# Patient Record
Sex: MALE | Race: WHITE | Employment: FULL TIME | URBAN - METROPOLITAN AREA
[De-identification: names, ages, dates, MRNs, and addresses within clinical notes are randomized per-mention and may not be internally consistent; named-entity substitution may affect disease eponyms.]

---

## 2017-10-21 ENCOUNTER — HOSPITAL ENCOUNTER (OUTPATIENT)
Dept: LAB | Age: 67
Discharge: HOME | End: 2017-10-21

## 2017-10-21 ENCOUNTER — HOSPITAL ENCOUNTER (EMERGENCY)
Age: 67
Discharge: HOME | End: 2017-10-21
Attending: EMERGENCY MEDICINE

## 2017-10-21 VITALS
SYSTOLIC BLOOD PRESSURE: 111 MMHG | HEIGHT: 70 IN | TEMPERATURE: 99.4 F | WEIGHT: 181 LBS | HEART RATE: 82 BPM | BODY MASS INDEX: 25.91 KG/M2 | OXYGEN SATURATION: 96 % | RESPIRATION RATE: 16 BRPM | DIASTOLIC BLOOD PRESSURE: 58 MMHG

## 2017-10-21 DIAGNOSIS — M70.22 OLECRANON BURSITIS OF LEFT ELBOW: Primary | ICD-10-CM

## 2017-10-21 PROCEDURE — 89050 BODY FLUID CELL COUNT: CPT

## 2017-10-21 PROCEDURE — 89060 EXAM SYNOVIAL FLUID CRYSTALS: CPT

## 2017-10-21 PROCEDURE — 87205 SMEAR GRAM STAIN: CPT

## 2017-10-21 RX ORDER — PREDNISONE 20 MG/1
60 TABLET ORAL DAILY
Qty: 30 TAB | Refills: 0 | Status: SHIPPED | OUTPATIENT
Start: 2017-10-21 | End: 2017-10-26

## 2017-10-21 RX ORDER — COLCHICINE 0.6 MG/1
0.6 TABLET ORAL DAILY
COMMUNITY
End: 2018-08-24

## 2017-10-21 RX ORDER — OXYCODONE AND ACETAMINOPHEN 5; 325 MG/1; MG/1
1 TABLET ORAL
Qty: 20 TAB | Refills: 0 | Status: SHIPPED | OUTPATIENT
Start: 2017-10-21 | End: 2018-02-25

## 2017-10-21 NOTE — DISCHARGE INSTRUCTIONS
Bursitis: Care Instructions  Your Care Instructions  A bursa is a small sac of fluid that helps the tissues around a joint slide over one another easily. Injury or overuse of a joint can cause pain, redness, and inflammation in the bursa (bursitis). Bursitis usually gets better if you avoid the activity that caused it. You can help prevent bursitis from coming back by doing stretching and strengthening exercises. You may also need to change the way you do some activities. Follow-up care is a key part of your treatment and safety. Be sure to make and go to all appointments, and call your doctor if you are having problems. Its also a good idea to know your test results and keep a list of the medicines you take. How can you care for yourself at home? · Put ice or a cold pack on the area for 10 to 20 minutes at a time. Try to do this every 1 to 2 hours for the next 3 days (when you are awake) or until the swelling goes down. Put a thin cloth between the ice and your skin. · After the 3 days of using ice, you may use heat on the area. You can use a hot water bottle; a warm, moist towel; or a heating pad set on low. You can also try alternating heat and ice. · Rest the area where you have pain. Stop any activities that cause pain. Switch to activities that do not stress the area. · Take pain medicines exactly as directed. ¨ If the doctor gave you a prescription medicine for pain, take it as prescribed. ¨ If you are not taking a prescription pain medicine, ask your doctor if you can take an over-the-counter medicine. ¨ Do not take two or more pain medicines at the same time unless the doctor told you to. Many pain medicines have acetaminophen, which is Tylenol. Too much acetaminophen (Tylenol) can be harmful. · To prevent stiffness, gently move the joint as much as you can without pain every day. As the pain gets better, keep doing range-of-motion exercises.  Ask your doctor for exercises that will make the muscles around the joint stronger. Do these as directed. · You can slowly return to the activity that caused the pain, but do it with less effort until you can do it without pain or swelling. Be sure to warm up before and stretch after you do the activity. When should you call for help? Call your doctor now or seek immediate medical care if:  · You get a fever and chills. · You have increased swelling or redness in a joint. · You cannot use a joint, or the pain in a joint gets worse. Watch closely for changes in your health, and be sure to contact your doctor if:  · You have pain for 2 weeks or longer despite home treatment. Where can you learn more? Go to http://puma-katharina.info/. Enter Y003 in the search box to learn more about \"Bursitis: Care Instructions. \"  Current as of: May 23, 2016  Content Version: 11.3  © 0432-5316 [employer]. Care instructions adapted under license by [employer] (which disclaims liability or warranty for this information). If you have questions about a medical condition or this instruction, always ask your healthcare professional. [place] [address] any warranty or liability for your use of this information.

## 2017-10-21 NOTE — UC PROVIDER NOTE
HPI Comments: Hx of recurrent gout. On colchicine. Stopped allopurinol b/c gave him diarrhea. Has had hx of gout in this elbow, requiring steroid injection and course of po steroids. Patient is a 79 y.o. male presenting with elbow pain. The history is provided by the patient. Elbow Pain    This is a recurrent problem. Episode onset: 2-3 months; got worse starting yesterday. The problem occurs constantly. The problem has been gradually worsening. The pain is present in the left elbow. The quality of the pain is described as constant. The pain is severe. Pertinent negatives include no numbness, full range of motion, no stiffness, no tingling and no itching. The symptoms are aggravated by palpation. He has tried OTC pain medications for the symptoms. The treatment provided no relief. There has been no history of extremity trauma. No past medical history on file. No past surgical history on file. No family history on file. Social History     Social History    Marital status:      Spouse name: N/A    Number of children: N/A    Years of education: N/A     Occupational History    Not on file. Social History Main Topics    Smoking status: Not on file    Smokeless tobacco: Not on file    Alcohol use Not on file    Drug use: Not on file    Sexual activity: Not on file     Other Topics Concern    Not on file     Social History Narrative                ALLERGIES: Review of patient's allergies indicates no known allergies. Review of Systems   Constitutional: Negative for chills and fever. Musculoskeletal: Positive for arthralgias and joint swelling. Negative for stiffness. Skin: Negative for itching and rash. Neurological: Negative for tingling and numbness.        Vitals:    10/21/17 1856   BP: 111/58   Pulse: 82   Resp: 16   Temp: 99.4 °F (37.4 °C)   SpO2: 96%   Weight: 82.1 kg (181 lb)   Height: 5' 10\" (1.778 m)       Physical Exam   Constitutional: He is oriented to person, place, and time. He appears well-developed and well-nourished. No distress. HENT:   Head: Normocephalic and atraumatic. Eyes: Conjunctivae are normal.   Cardiovascular: Normal rate. Pulmonary/Chest: Effort normal.   Musculoskeletal:   Swollen olecranon bursa without overlying erythema or drainage   Neurological: He is alert and oriented to person, place, and time. Skin: He is not diaphoretic. MDM     Differential Diagnosis; Clinical Impression; Plan:     Left elbow bursitis. Possibly due to gout? Aspiration of bursa had some possibly tophaceous material.   - bursa fluid sent for analysis  - steroid course  - percocet  - f/u ortho      Procedures  Procedure Note - Arthrocentesis:   8:01 PM  Performed by Kameron Wall MD.     Immediately prior to the procedure, the patient was reevaluated and found suitable for the planned procedure and any planned medications. Immediately prior to the procedure a time out was called to verify the correct patient, procedure, equipment, staff, and marking as appropriate. Indication for procedure: Unexplained joint effusion  Approach: lateral  The site prepped with Betadine. Sterile field established. Anesthesia was obtained with 2mLs of Lidocaine 1% without epinephrine. L elbow bursa joint was entered, using a 18 gauge needle, and 7 cc's of straw colored/turbid fluid with chalky material was withdrawn and sent for aerobic culture, cell count & diff, crystal analysis and gram stain. Estimated blood loss: 1cc  The procedure took 1-15 minutes, and pt tolerated well.   Written by Kameron Wall MD

## 2017-10-22 LAB
APPEARANCE FLD: ABNORMAL
BODY FLD TYPE: NORMAL
COLOR FLD: ABNORMAL
CRYSTALS FLD MICRO: NORMAL
LYMPHOCYTES NFR FLD: 1 %
MONOS+MACROS NFR FLD: 2 %
NEUTROPHILS NFR FLD: 97 %
NUC CELL # FLD: ABNORMAL /CU MM (ref 0–5)
RBC # FLD: >100 /CU MM
SPECIMEN SOURCE FLD: ABNORMAL

## 2017-11-04 LAB
BACTERIA SPEC CULT: NORMAL
BACTERIA SPEC CULT: NORMAL
GRAM STN SPEC: NORMAL
GRAM STN SPEC: NORMAL
SERVICE CMNT-IMP: NORMAL

## 2018-02-25 ENCOUNTER — HOSPITAL ENCOUNTER (EMERGENCY)
Age: 68
Discharge: HOME | End: 2018-02-25
Attending: FAMILY MEDICINE

## 2018-02-25 VITALS
TEMPERATURE: 97.8 F | DIASTOLIC BLOOD PRESSURE: 70 MMHG | RESPIRATION RATE: 18 BRPM | OXYGEN SATURATION: 97 % | WEIGHT: 181 LBS | HEIGHT: 70 IN | HEART RATE: 69 BPM | SYSTOLIC BLOOD PRESSURE: 155 MMHG | BODY MASS INDEX: 25.91 KG/M2

## 2018-02-25 DIAGNOSIS — M10.9 ACUTE GOUT OF RIGHT ANKLE, UNSPECIFIED CAUSE: Primary | ICD-10-CM

## 2018-02-25 DIAGNOSIS — M10.9 ACUTE GOUT OF RIGHT ELBOW, UNSPECIFIED CAUSE: ICD-10-CM

## 2018-02-25 RX ORDER — PREDNISONE 20 MG/1
60 TABLET ORAL DAILY
Qty: 15 TAB | Refills: 0 | Status: SHIPPED | OUTPATIENT
Start: 2018-02-25 | End: 2018-03-02

## 2018-02-25 NOTE — DISCHARGE INSTRUCTIONS
Gout: Care Instructions  Your Care Instructions    Gout is a form of arthritis caused by a buildup of uric acid crystals in a joint. It causes sudden attacks of pain, swelling, redness, and stiffness, usually in one joint, especially the big toe. Gout usually comes on without a cause. But it can be brought on by drinking alcohol (especially beer) or eating seafood and red meat. Taking certain medicines, such as diuretics or aspirin, also can bring on an attack of gout. Taking your medicines as prescribed and following up with your doctor regularly can help you avoid gout attacks in the future. Follow-up care is a key part of your treatment and safety. Be sure to make and go to all appointments, and call your doctor if you are having problems. It's also a good idea to know your test results and keep a list of the medicines you take. How can you care for yourself at home? · If the joint is swollen, put ice or a cold pack on the area for 10 to 20 minutes at a time. Put a thin cloth between the ice and your skin. · Prop up the sore limb on a pillow when you ice it or anytime you sit or lie down during the next 3 days. Try to keep it above the level of your heart. This will help reduce swelling. · Rest sore joints. Avoid activities that put weight or strain on the joints for a few days. Take short rest breaks from your regular activities during the day. · Take your medicines exactly as prescribed. Call your doctor if you think you are having a problem with your medicine. · Take pain medicines exactly as directed. ¨ If the doctor gave you a prescription medicine for pain, take it as prescribed. ¨ If you are not taking a prescription pain medicine, ask your doctor if you can take an over-the-counter medicine. · Eat less seafood and red meat. · Check with your doctor before drinking alcohol. · Losing weight, if you are overweight, may help reduce attacks of gout. But do not go on a [employer]. \" Losing a lot of weight in a short amount of time can cause a gout attack. When should you call for help? Call your doctor now or seek immediate medical care if:  ? · You have a fever. ? · The joint is so painful you cannot use it. ? · You have sudden, unexplained swelling, redness, warmth, or severe pain in one or more joints. ? Watch closely for changes in your health, and be sure to contact your doctor if:  ? · You have joint pain. ? · Your symptoms get worse or are not improving after 2 or 3 days. Where can you learn more? Go to http://puma-katharina.info/. Enter P608 in the search box to learn more about \"Gout: Care Instructions. \"  Current as of: October 31, 2016  Content Version: 11.4  © 1083-8999 [employer]. Care instructions adapted under license by [employer] (which disclaims liability or warranty for this information). If you have questions about a medical condition or this instruction, always ask your healthcare professional. [address] any warranty or liability for your use of this information.

## 2018-02-25 NOTE — UC PROVIDER NOTE
HPI Comments: Patient has a 20+ year h/o gout. He takes colcrys and denies any recent changes to his diet and reports limiting his meat and etoh intake. The current pain and swelling are not unlike what he has experienced before. The pain began last week in both areas. Patient is a 76 y.o. male presenting with elbow pain and knee pain. The history is provided by the patient. Elbow Pain    The current episode started more than 2 days ago. The pain is present in the right elbow and right lower leg. The quality of the pain is described as constant. The pain is moderate. Treatments tried: colcrys. The treatment provided no relief. Knee Pain           Past Medical History:   Diagnosis Date    Ill-defined condition     gout        History reviewed. No pertinent surgical history. History reviewed. No pertinent family history. Social History     Social History    Marital status:      Spouse name: N/A    Number of children: N/A    Years of education: N/A     Occupational History    Not on file. Social History Main Topics    Smoking status: Current Every Day Smoker    Smokeless tobacco: Former User    Alcohol use Not on file    Drug use: Not on file    Sexual activity: Not on file     Other Topics Concern    Not on file     Social History Narrative    No narrative on file                ALLERGIES: Review of patient's allergies indicates no known allergies. Review of Systems    Vitals:    02/25/18 0902   BP: 155/70   Pulse: 69   Resp: 18   Temp: 97.8 °F (36.6 °C)   SpO2: 97%   Weight: 82.1 kg (181 lb)   Height: 5' 10\" (1.778 m)       Physical Exam   Constitutional: He is oriented to person, place, and time. He appears well-developed and well-nourished. Musculoskeletal:        Right elbow: He exhibits swelling. Tenderness found. Right ankle: He exhibits swelling. Tenderness. Lateral malleolus tenderness found. Swollen and tender areas on the right elbow and ankle.  No red streaks or bruising. Neurological: He is alert and oriented to person, place, and time. Skin: Skin is warm and dry. Psychiatric: He has a normal mood and affect. MDM     Differential Diagnosis; Clinical Impression; Plan:     Gout flare up. Will treat with steroids. Advised f/u with orthopedics due to chronicity. Discussed results of joint aspiration from last visit.  Will f/u with pcp for BP  Progress:   Patient progress:  Stable      Procedures

## 2018-07-01 ENCOUNTER — APPOINTMENT (OUTPATIENT)
Dept: GENERAL RADIOLOGY | Age: 68
DRG: 853 | End: 2018-07-01
Attending: EMERGENCY MEDICINE
Payer: COMMERCIAL

## 2018-07-01 ENCOUNTER — HOSPITAL ENCOUNTER (INPATIENT)
Age: 68
LOS: 6 days | Discharge: HOME | DRG: 853 | End: 2018-07-07
Attending: EMERGENCY MEDICINE | Admitting: INTERNAL MEDICINE
Payer: COMMERCIAL

## 2018-07-01 DIAGNOSIS — M10.9 GOUTY ARTHRITIS: ICD-10-CM

## 2018-07-01 DIAGNOSIS — A41.9 SEPTIC SHOCK (HCC): Primary | ICD-10-CM

## 2018-07-01 DIAGNOSIS — I48.92 ATRIAL FLUTTER WITH CONTROLLED RESPONSE (HCC): ICD-10-CM

## 2018-07-01 DIAGNOSIS — R65.21 SEPTIC SHOCK (HCC): Primary | ICD-10-CM

## 2018-07-01 DIAGNOSIS — N17.9 ACUTE RENAL FAILURE, UNSPECIFIED ACUTE RENAL FAILURE TYPE (HCC): ICD-10-CM

## 2018-07-01 PROBLEM — R65.20 SEVERE SEPSIS (HCC): Status: ACTIVE | Noted: 2018-07-01

## 2018-07-01 LAB
ALBUMIN SERPL-MCNC: 3.1 G/DL (ref 3.5–5)
ALBUMIN/GLOB SERPL: 0.9 {RATIO} (ref 1.1–2.2)
ALP SERPL-CCNC: 115 U/L (ref 45–117)
ALT SERPL-CCNC: 46 U/L (ref 12–78)
ANION GAP SERPL CALC-SCNC: 10 MMOL/L (ref 5–15)
APPEARANCE UR: CLEAR
ARTERIAL PATENCY WRIST A: YES
AST SERPL-CCNC: 23 U/L (ref 15–37)
BASE DEFICIT BLDA-SCNC: 6.5 MMOL/L
BASOPHILS # BLD: 0.1 K/UL (ref 0–0.1)
BASOPHILS NFR BLD: 1 % (ref 0–1)
BDY SITE: ABNORMAL
BILIRUB SERPL-MCNC: 0.7 MG/DL (ref 0.2–1)
BILIRUB UR QL: NEGATIVE
BUN SERPL-MCNC: 60 MG/DL (ref 6–20)
BUN/CREAT SERPL: 27 (ref 12–20)
CALCIUM SERPL-MCNC: 8.9 MG/DL (ref 8.5–10.1)
CHLORIDE SERPL-SCNC: 110 MMOL/L (ref 97–108)
CK MB CFR SERPL CALC: 3.7 % (ref 0–2.5)
CK MB SERPL-MCNC: 1.1 NG/ML (ref 5–25)
CK SERPL-CCNC: 30 U/L (ref 39–308)
CO2 SERPL-SCNC: 20 MMOL/L (ref 21–32)
COLOR UR: ABNORMAL
CREAT SERPL-MCNC: 2.21 MG/DL (ref 0.7–1.3)
CRP SERPL-MCNC: 2.53 MG/DL (ref 0–0.6)
DIFFERENTIAL METHOD BLD: ABNORMAL
EOSINOPHIL # BLD: 0.1 K/UL (ref 0–0.4)
EOSINOPHIL NFR BLD: 1 % (ref 0–7)
ERYTHROCYTE [DISTWIDTH] IN BLOOD BY AUTOMATED COUNT: 14.3 % (ref 11.5–14.5)
ERYTHROCYTE [SEDIMENTATION RATE] IN BLOOD: 75 MM/HR (ref 0–20)
FLUAV AG NPH QL IA: NEGATIVE
FLUBV AG NOSE QL IA: NEGATIVE
GLOBULIN SER CALC-MCNC: 3.5 G/DL (ref 2–4)
GLUCOSE SERPL-MCNC: 83 MG/DL (ref 65–100)
GLUCOSE UR STRIP.AUTO-MCNC: 100 MG/DL
HCO3 BLDA-SCNC: 17 MMOL/L (ref 22–26)
HCT VFR BLD AUTO: 36.9 % (ref 36.6–50.3)
HGB BLD-MCNC: 12.9 G/DL (ref 12.1–17)
HGB UR QL STRIP: NEGATIVE
IMM GRANULOCYTES # BLD: 0.9 K/UL (ref 0–0.04)
IMM GRANULOCYTES NFR BLD AUTO: 6 % (ref 0–0.5)
INR PPP: 1 (ref 0.9–1.1)
INR PPP: 1.1 (ref 0.9–1.1)
KETONES UR QL STRIP.AUTO: NEGATIVE MG/DL
LACTATE BLD-SCNC: 1.2 MMOL/L (ref 0.4–2)
LACTATE SERPL-SCNC: 0.9 MMOL/L (ref 0.4–2)
LEUKOCYTE ESTERASE UR QL STRIP.AUTO: NEGATIVE
LYMPHOCYTES # BLD: 2.4 K/UL (ref 0.8–3.5)
LYMPHOCYTES NFR BLD: 17 % (ref 12–49)
MAGNESIUM SERPL-MCNC: 1.7 MG/DL (ref 1.6–2.4)
MAGNESIUM SERPL-MCNC: 2 MG/DL (ref 1.6–2.4)
MCH RBC QN AUTO: 30.6 PG (ref 26–34)
MCHC RBC AUTO-ENTMCNC: 35 G/DL (ref 30–36.5)
MCV RBC AUTO: 87.6 FL (ref 80–99)
MONOCYTES # BLD: 1.2 K/UL (ref 0–1)
MONOCYTES NFR BLD: 8 % (ref 5–13)
NEUTS SEG # BLD: 9.7 K/UL (ref 1.8–8)
NEUTS SEG NFR BLD: 67 % (ref 32–75)
NITRITE UR QL STRIP.AUTO: NEGATIVE
NRBC # BLD: 0 K/UL (ref 0–0.01)
NRBC BLD-RTO: 0 PER 100 WBC
PCO2 BLDA: 29 MMHG (ref 35–45)
PH BLDA: 7.38 [PH] (ref 7.35–7.45)
PH UR STRIP: 5 [PH] (ref 5–8)
PLATELET # BLD AUTO: 223 K/UL (ref 150–400)
PMV BLD AUTO: 10.5 FL (ref 8.9–12.9)
PO2 BLDA: 87 MMHG (ref 80–100)
POTASSIUM SERPL-SCNC: 5.9 MMOL/L (ref 3.5–5.1)
PROT SERPL-MCNC: 6.6 G/DL (ref 6.4–8.2)
PROT UR STRIP-MCNC: NEGATIVE MG/DL
PROTHROMBIN TIME: 10.3 SEC (ref 9–11.1)
PROTHROMBIN TIME: 11.1 SEC (ref 9–11.1)
RBC # BLD AUTO: 4.21 M/UL (ref 4.1–5.7)
RBC MORPH BLD: ABNORMAL
SAO2 % BLD: 97 % (ref 92–97)
SAO2% DEVICE SAO2% SENSOR NAME: ABNORMAL
SODIUM SERPL-SCNC: 140 MMOL/L (ref 136–145)
SP GR UR REFRACTOMETRY: 1.01 (ref 1–1.03)
SPECIMEN SITE: ABNORMAL
TROPONIN I SERPL-MCNC: <0.05 NG/ML
TSH SERPL DL<=0.05 MIU/L-ACNC: 3.84 UIU/ML (ref 0.36–3.74)
UROBILINOGEN UR QL STRIP.AUTO: 0.2 EU/DL (ref 0.2–1)
WBC # BLD AUTO: 14.4 K/UL (ref 4.1–11.1)

## 2018-07-01 PROCEDURE — 83735 ASSAY OF MAGNESIUM: CPT | Performed by: EMERGENCY MEDICINE

## 2018-07-01 PROCEDURE — 81003 URINALYSIS AUTO W/O SCOPE: CPT | Performed by: INTERNAL MEDICINE

## 2018-07-01 PROCEDURE — 74011000250 HC RX REV CODE- 250: Performed by: EMERGENCY MEDICINE

## 2018-07-01 PROCEDURE — 85610 PROTHROMBIN TIME: CPT | Performed by: EMERGENCY MEDICINE

## 2018-07-01 PROCEDURE — 84484 ASSAY OF TROPONIN QUANT: CPT | Performed by: EMERGENCY MEDICINE

## 2018-07-01 PROCEDURE — 36600 WITHDRAWAL OF ARTERIAL BLOOD: CPT | Performed by: INTERNAL MEDICINE

## 2018-07-01 PROCEDURE — 96365 THER/PROPH/DIAG IV INF INIT: CPT

## 2018-07-01 PROCEDURE — 85025 COMPLETE CBC W/AUTO DIFF WBC: CPT | Performed by: EMERGENCY MEDICINE

## 2018-07-01 PROCEDURE — 71045 X-RAY EXAM CHEST 1 VIEW: CPT

## 2018-07-01 PROCEDURE — 85652 RBC SED RATE AUTOMATED: CPT | Performed by: EMERGENCY MEDICINE

## 2018-07-01 PROCEDURE — 82803 BLOOD GASES ANY COMBINATION: CPT | Performed by: INTERNAL MEDICINE

## 2018-07-01 PROCEDURE — 74011250636 HC RX REV CODE- 250/636: Performed by: EMERGENCY MEDICINE

## 2018-07-01 PROCEDURE — 74011000258 HC RX REV CODE- 258: Performed by: EMERGENCY MEDICINE

## 2018-07-01 PROCEDURE — 65610000006 HC RM INTENSIVE CARE

## 2018-07-01 PROCEDURE — 87804 INFLUENZA ASSAY W/OPTIC: CPT | Performed by: INTERNAL MEDICINE

## 2018-07-01 PROCEDURE — 96367 TX/PROPH/DG ADDL SEQ IV INF: CPT

## 2018-07-01 PROCEDURE — 84443 ASSAY THYROID STIM HORMONE: CPT | Performed by: EMERGENCY MEDICINE

## 2018-07-01 PROCEDURE — 99285 EMERGENCY DEPT VISIT HI MDM: CPT

## 2018-07-01 PROCEDURE — 83605 ASSAY OF LACTIC ACID: CPT

## 2018-07-01 PROCEDURE — 82550 ASSAY OF CK (CPK): CPT | Performed by: EMERGENCY MEDICINE

## 2018-07-01 PROCEDURE — 74011250636 HC RX REV CODE- 250/636: Performed by: INTERNAL MEDICINE

## 2018-07-01 PROCEDURE — 87493 C DIFF AMPLIFIED PROBE: CPT | Performed by: HOSPITALIST

## 2018-07-01 PROCEDURE — 83605 ASSAY OF LACTIC ACID: CPT | Performed by: INTERNAL MEDICINE

## 2018-07-01 PROCEDURE — 86140 C-REACTIVE PROTEIN: CPT | Performed by: EMERGENCY MEDICINE

## 2018-07-01 PROCEDURE — 36415 COLL VENOUS BLD VENIPUNCTURE: CPT | Performed by: EMERGENCY MEDICINE

## 2018-07-01 PROCEDURE — 83735 ASSAY OF MAGNESIUM: CPT | Performed by: INTERNAL MEDICINE

## 2018-07-01 PROCEDURE — 51798 US URINE CAPACITY MEASURE: CPT

## 2018-07-01 PROCEDURE — 74011250637 HC RX REV CODE- 250/637: Performed by: HOSPITALIST

## 2018-07-01 PROCEDURE — 93005 ELECTROCARDIOGRAM TRACING: CPT

## 2018-07-01 PROCEDURE — 74011000258 HC RX REV CODE- 258: Performed by: INTERNAL MEDICINE

## 2018-07-01 PROCEDURE — 80053 COMPREHEN METABOLIC PANEL: CPT | Performed by: EMERGENCY MEDICINE

## 2018-07-01 PROCEDURE — 87040 BLOOD CULTURE FOR BACTERIA: CPT | Performed by: EMERGENCY MEDICINE

## 2018-07-01 RX ORDER — SODIUM CHLORIDE 0.9 % (FLUSH) 0.9 %
5-10 SYRINGE (ML) INJECTION AS NEEDED
Status: DISCONTINUED | OUTPATIENT
Start: 2018-07-01 | End: 2018-07-07 | Stop reason: HOSPADM

## 2018-07-01 RX ORDER — NICOTINE 7MG/24HR
1 PATCH, TRANSDERMAL 24 HOURS TRANSDERMAL
Status: DISCONTINUED | OUTPATIENT
Start: 2018-07-01 | End: 2018-07-07 | Stop reason: HOSPADM

## 2018-07-01 RX ORDER — RANITIDINE 300 MG/1
300 TABLET ORAL DAILY
COMMUNITY
End: 2018-08-22

## 2018-07-01 RX ORDER — VANCOMYCIN/0.9 % SOD CHLORIDE 1.5G/250ML
1500 PLASTIC BAG, INJECTION (ML) INTRAVENOUS
Status: DISCONTINUED | OUTPATIENT
Start: 2018-07-01 | End: 2018-07-01

## 2018-07-01 RX ORDER — SODIUM CHLORIDE 0.9 % (FLUSH) 0.9 %
5-10 SYRINGE (ML) INJECTION EVERY 8 HOURS
Status: DISCONTINUED | OUTPATIENT
Start: 2018-07-01 | End: 2018-07-07 | Stop reason: HOSPADM

## 2018-07-01 RX ORDER — SODIUM CHLORIDE 0.9 % (FLUSH) 0.9 %
5-10 SYRINGE (ML) INJECTION AS NEEDED
Status: DISCONTINUED | OUTPATIENT
Start: 2018-07-01 | End: 2018-07-01

## 2018-07-01 RX ORDER — HEPARIN SODIUM 5000 [USP'U]/ML
5000 INJECTION, SOLUTION INTRAVENOUS; SUBCUTANEOUS EVERY 8 HOURS
Status: DISCONTINUED | OUTPATIENT
Start: 2018-07-01 | End: 2018-07-04

## 2018-07-01 RX ORDER — BISACODYL 5 MG
5 TABLET, DELAYED RELEASE (ENTERIC COATED) ORAL DAILY PRN
Status: DISCONTINUED | OUTPATIENT
Start: 2018-07-01 | End: 2018-07-07 | Stop reason: HOSPADM

## 2018-07-01 RX ORDER — VANCOMYCIN 2 GRAM/500 ML IN 0.9 % SODIUM CHLORIDE INTRAVENOUS
2000
Status: COMPLETED | OUTPATIENT
Start: 2018-07-01 | End: 2018-07-01

## 2018-07-01 RX ORDER — ALBUTEROL SULFATE 90 UG/1
2 AEROSOL, METERED RESPIRATORY (INHALATION)
COMMUNITY
End: 2018-08-22

## 2018-07-01 RX ORDER — SODIUM CHLORIDE 9 MG/ML
125 INJECTION, SOLUTION INTRAVENOUS CONTINUOUS
Status: DISPENSED | OUTPATIENT
Start: 2018-07-01 | End: 2018-07-02

## 2018-07-01 RX ORDER — ONDANSETRON 2 MG/ML
2 INJECTION INTRAMUSCULAR; INTRAVENOUS
Status: DISCONTINUED | OUTPATIENT
Start: 2018-07-01 | End: 2018-07-07 | Stop reason: HOSPADM

## 2018-07-01 RX ORDER — DOXYCYCLINE 100 MG/1
100 TABLET ORAL 2 TIMES DAILY
COMMUNITY
End: 2018-07-01

## 2018-07-01 RX ORDER — NALOXONE HYDROCHLORIDE 0.4 MG/ML
0.4 INJECTION, SOLUTION INTRAMUSCULAR; INTRAVENOUS; SUBCUTANEOUS AS NEEDED
Status: DISCONTINUED | OUTPATIENT
Start: 2018-07-01 | End: 2018-07-07 | Stop reason: HOSPADM

## 2018-07-01 RX ORDER — NOREPINEPHRINE BITARTRATE/D5W 8 MG/250ML
2-16 PLASTIC BAG, INJECTION (ML) INTRAVENOUS CONTINUOUS
Status: DISCONTINUED | OUTPATIENT
Start: 2018-07-01 | End: 2018-07-02

## 2018-07-01 RX ORDER — HYDROCORTISONE SODIUM SUCCINATE 100 MG/2ML
100 INJECTION, POWDER, FOR SOLUTION INTRAMUSCULAR; INTRAVENOUS EVERY 8 HOURS
Status: COMPLETED | OUTPATIENT
Start: 2018-07-01 | End: 2018-07-03

## 2018-07-01 RX ORDER — LEVOFLOXACIN 5 MG/ML
750 INJECTION, SOLUTION INTRAVENOUS
Status: COMPLETED | OUTPATIENT
Start: 2018-07-01 | End: 2018-07-01

## 2018-07-01 RX ORDER — LISINOPRIL 10 MG/1
10 TABLET ORAL DAILY
COMMUNITY
End: 2018-08-24

## 2018-07-01 RX ORDER — SODIUM CHLORIDE 0.9 % (FLUSH) 0.9 %
5-10 SYRINGE (ML) INJECTION EVERY 8 HOURS
Status: DISCONTINUED | OUTPATIENT
Start: 2018-07-01 | End: 2018-07-03 | Stop reason: SDUPTHER

## 2018-07-01 RX ORDER — HYDROCODONE BITARTRATE AND ACETAMINOPHEN 5; 325 MG/1; MG/1
1 TABLET ORAL
Status: DISCONTINUED | OUTPATIENT
Start: 2018-07-01 | End: 2018-07-07 | Stop reason: HOSPADM

## 2018-07-01 RX ORDER — VANCOMYCIN HYDROCHLORIDE
1250 EVERY 24 HOURS
Status: DISCONTINUED | OUTPATIENT
Start: 2018-07-02 | End: 2018-07-02

## 2018-07-01 RX ORDER — ACETAMINOPHEN 500 MG
500 TABLET ORAL
Status: DISCONTINUED | OUTPATIENT
Start: 2018-07-01 | End: 2018-07-07 | Stop reason: HOSPADM

## 2018-07-01 RX ORDER — MUPIROCIN 20 MG/G
OINTMENT TOPICAL EVERY 12 HOURS
Status: DISPENSED | OUTPATIENT
Start: 2018-07-01 | End: 2018-07-06

## 2018-07-01 RX ADMIN — PIPERACILLIN SODIUM,TAZOBACTAM SODIUM 3.38 G: 3; .375 INJECTION, POWDER, FOR SOLUTION INTRAVENOUS at 14:13

## 2018-07-01 RX ADMIN — Medication 10 ML: at 22:42

## 2018-07-01 RX ADMIN — HYDROCORTISONE SODIUM SUCCINATE 100 MG: 100 INJECTION, POWDER, FOR SOLUTION INTRAMUSCULAR; INTRAVENOUS at 22:40

## 2018-07-01 RX ADMIN — SODIUM CHLORIDE 1500 ML: 900 INJECTION, SOLUTION INTRAVENOUS at 12:18

## 2018-07-01 RX ADMIN — CEFEPIME HYDROCHLORIDE 2 G: 2 INJECTION, POWDER, FOR SOLUTION INTRAVENOUS at 17:18

## 2018-07-01 RX ADMIN — HEPARIN SODIUM 5000 UNITS: 5000 INJECTION, SOLUTION INTRAVENOUS; SUBCUTANEOUS at 17:36

## 2018-07-01 RX ADMIN — SODIUM CHLORIDE 1000 ML: 900 INJECTION, SOLUTION INTRAVENOUS at 12:03

## 2018-07-01 RX ADMIN — LEVOFLOXACIN 750 MG: 5 INJECTION, SOLUTION INTRAVENOUS at 12:24

## 2018-07-01 RX ADMIN — VANCOMYCIN HYDROCHLORIDE 2000 MG: 10 INJECTION, POWDER, LYOPHILIZED, FOR SOLUTION INTRAVENOUS at 18:08

## 2018-07-01 RX ADMIN — Medication 2 MCG/MIN: at 17:03

## 2018-07-01 RX ADMIN — Medication 10 ML: at 17:40

## 2018-07-01 RX ADMIN — NOREPINEPHRINE BITARTRATE 2 MCG/MIN: 1 INJECTION INTRAVENOUS at 16:39

## 2018-07-01 RX ADMIN — Medication 10 ML: at 12:04

## 2018-07-01 RX ADMIN — SODIUM CHLORIDE 125 ML/HR: 900 INJECTION, SOLUTION INTRAVENOUS at 17:09

## 2018-07-01 RX ADMIN — MUPIROCIN: 20 OINTMENT TOPICAL at 21:00

## 2018-07-01 RX ADMIN — Medication 10 ML: at 16:42

## 2018-07-01 RX ADMIN — HYDROCORTISONE SODIUM SUCCINATE 100 MG: 100 INJECTION, POWDER, FOR SOLUTION INTRAMUSCULAR; INTRAVENOUS at 16:44

## 2018-07-01 NOTE — PROGRESS NOTES
Pharmacy Automatic Renal Dosing Protocol - Antimicrobials    Indication for Antimicrobials: Unknown     Current Regimen of Each Antimicrobial:  Vancomycin 2000 mg IV load followed by 1250 mg IV every 24 hours (Start Date ; Day # 1)  Cefepime 2 g IV every 8 hours (Start Date ; Day # 1)    Previous Antimicrobial Therapy:  Levofloxacin     Goal Level: VANCOMYCIN TROUGH GOAL RANGE  Vancomycin Trough: 15 - 20 mcg/mL    Date Dose & Interval Measured (mcg/mL) Extrapolated (mcg/mL)                       Significant Cultures:    Blood: pending    Paralysis, amputations, malnutrition: None documented    Labs:  Recent Labs      18   1123   CREA  2.21*   BUN  60*   WBC  14.4*     Temp (24hrs), Av.1 °F (36.7 °C), Min:97.6 °F (36.4 °C), Max:98.6 °F (37 °C)    Creatinine Clearance (mL/min) or Dialysis: 33 mL/min    Impression/Plan:   · Will order vancomycin 2000 mg IV load followed by 1250 mg IV every an estimated trough of 17.8 mcg/mL. · Will change cefepime to 2 g IV every 12 hours for CrCl 30-60 mL/min per renal dosing protocol. · Antimicrobial stop date TBD     Pharmacy will follow daily and adjust medications as appropriate for renal function and/or serum levels. Thank you,  Lenin Cali PHARMD    Recommended duration of therapy  http://[facility]/[place]/[facility]/Pharmacy/Clinical%20Companion/Duration%20of%[email or web address]. docx    Renal Dosing  http://[facility]/[place]/[facility]/Pharmacy/Clinical%20Companion/Renal%[email or web address]. pdf

## 2018-07-01 NOTE — IP AVS SNAPSHOT
[address] [facility] 
[phone] Patient: Angelica Stewart Sr. MRN: [id number] QDL:[birth year] A check evangelina indicates which time of day the medication should be taken. My Medications START taking these medications Instructions Each Dose to Equal  
 Morning Noon Evening Bedtime  
 apixaban 2.5 mg tablet Commonly known as:  Jason Chicas Your last dose was: Your next dose is: Take 1 Tab by mouth two (2) times a day. 2.5 mg  
    
   
   
   
  
 folic acid 1 mg tablet Commonly known as:  Nancy Start taking on:  7/8/2018 Your last dose was: Your next dose is: Take 1 Tab by mouth daily. 1 mg  
    
   
   
   
  
 nicotine 7 mg/24 hr  
Commonly known as:  Mark Malone Your last dose was: Your next dose is:    
   
   
 1 Patch by TransDERmal route daily as needed (Tobacco crave) for up to 30 days. 1 Patch  
    
   
   
   
  
 predniSONE 10 mg tablet Commonly known as:  Vickie Rodriguez Start taking on:  7/8/2018 Your last dose was: Your next dose is: Take 2 Tabs by mouth daily (with breakfast). 20 mg  
    
   
   
   
  
 propranolol 10 mg tablet Commonly known as:  INDERAL Your last dose was: Your next dose is: Take 1 Tab by mouth two (2) times a day. 10 mg  
    
   
   
   
  
 therapeutic multivitamin tablet Commonly known as:  [facility] Start taking on:  7/8/2018 Your last dose was: Your next dose is: Take 1 Tab by mouth daily. 1 Tab Thiamine Mononitrate 100 mg tablet Commonly known as:  B-1 Start taking on:  7/8/2018 Your last dose was: Your next dose is: Take 1 Tab by mouth daily. 100 mg CONTINUE taking these medications  Instructions Each Dose to Equal  
 Morning Noon Evening Bedtime COLCRYS 0.6 mg tablet Generic drug:  colchicine Your last dose was: Your next dose is: Take 0.6 mg by mouth daily. 0.6 mg  
    
   
   
   
  
 lisinopril 10 mg tablet Commonly known as:  [employer] Your last dose was: Your next dose is: Take 10 mg by mouth daily. 10 mg PROAIR HFA 90 mcg/actuation inhaler Generic drug:  albuterol Your last dose was: Your next dose is: Take 2 Puffs by inhalation. 2 Puff  
    
   
   
   
  
 raNITIdine 300 mg tablet Commonly known as:  ZANTAC Your last dose was: Your next dose is: Take 300 mg by mouth daily. 300 mg Where to Get Your Medications Information on where to get these meds will be given to you by the nurse or doctor. ! Ask your nurse or doctor about these medications  
  apixaban 2.5 mg tablet  
 folic acid 1 mg tablet  
 nicotine 7 mg/24 hr  
 predniSONE 10 mg tablet  
 propranolol 10 mg tablet  
 therapeutic multivitamin tablet Thiamine Mononitrate 100 mg tablet

## 2018-07-01 NOTE — ED NOTES
Per Dr. Oracio Del Toro pt being treated for septic shock. Source believed to be fingers where gout was \"cut out\" by pt.

## 2018-07-01 NOTE — ED NOTES
Remaining fluid bolus started per MD orders. Pt resting on stretcher in POC with call bell in reach. Pt remains on monitor x 3. VSS at this time.

## 2018-07-01 NOTE — PROGRESS NOTES
[facility]:Spoke to Caridad Alvarado RN in unit. Pt lung scan will be done in the morning. Spoke to radiologist. Nuclear Medicine doses ordered for study for Monday morning.

## 2018-07-01 NOTE — IP AVS SNAPSHOT
[address] [facility] 
[phone] Patient: Blanca Dooley Sr. MRN: [id number] WAD:[id number] About your hospitalization You were admitted on:  July 1, 2018 You last received care in the:  MRM 2 INTRVNTNL CARDIO You were discharged on:  July 7, 2018 Why you were hospitalized Your primary diagnosis was:  Severe Sepsis (Hcc) Follow-up Information Follow up With Details Comments Contact Info Danette Lam MD On 7/9/2018 10;30am  hospital follow-up [address] [place] [address] 
[phone] Michael Martins NP Schedule an appointment as soon as possible for a visit in 2 weeks  [address] [facility] 
[phone] Discharge Orders None A check evangelina indicates which time of day the medication should be taken. My Medications START taking these medications Instructions Each Dose to Equal  
 Morning Noon Evening Bedtime  
 apixaban 2.5 mg tablet Commonly known as:  Renan Barroso Your last dose was: Your next dose is: Take 1 Tab by mouth two (2) times a day. 2.5 mg  
    
   
   
   
  
 folic acid 1 mg tablet Commonly known as:  Nancy Start taking on:  7/8/2018 Your last dose was: Your next dose is: Take 1 Tab by mouth daily. 1 mg  
    
   
   
   
  
 nicotine 7 mg/24 hr  
Commonly known as:  Hudson Mays Your last dose was: Your next dose is:    
   
   
 1 Patch by TransDERmal route daily as needed (Tobacco crave) for up to 30 days. 1 Patch  
    
   
   
   
  
 predniSONE 10 mg tablet Commonly known as:  Fantasma Glass Start taking on:  7/8/2018 Your last dose was: Your next dose is: Take 2 Tabs by mouth daily (with breakfast). 20 mg  
    
   
   
   
  
 propranolol 10 mg tablet Commonly known as:  INDERAL  
   
 Your last dose was: Your next dose is: Take 1 Tab by mouth two (2) times a day. 10 mg Thiamine Mononitrate 100 mg tablet Commonly known as:  B-1 Start taking on:  7/8/2018 Your last dose was: Your next dose is: Take 1 Tab by mouth daily. 100 mg CONTINUE taking these medications Instructions Each Dose to Equal  
 Morning Noon Evening Bedtime COLCRYS 0.6 mg tablet Generic drug:  colchicine Your last dose was: Your next dose is: Take 0.6 mg by mouth daily. 0.6 mg  
    
   
   
   
  
 lisinopril 10 mg tablet Commonly known as:  Lyla Osorio Your last dose was: Your next dose is: Take 10 mg by mouth daily. 10 mg PROAIR HFA 90 mcg/actuation inhaler Generic drug:  albuterol Your last dose was: Your next dose is: Take 2 Puffs by inhalation. 2 Puff  
    
   
   
   
  
 raNITIdine 300 mg tablet Commonly known as:  ZANTAC Your last dose was: Your next dose is: Take 300 mg by mouth daily. 300 mg Where to Get Your Medications Information on where to get these meds will be given to you by the nurse or doctor. ! Ask your nurse or doctor about these medications  
  apixaban 2.5 mg tablet  
 folic acid 1 mg tablet  
 nicotine 7 mg/24 hr  
 predniSONE 10 mg tablet  
 propranolol 10 mg tablet Thiamine Mononitrate 100 mg tablet Discharge Instructions None Introducing [facility]! [facility] introduces [employer] patient portal. Now you can access parts of your medical record, email your doctor's office, and request medication refills online. 1. In your internet browser, go to [email or web address] 2. Click on the First Time User? Click Here link in the Sign In box.  You will see the New Member Sign Up page. 3. Enter your [employer] Access Code exactly as it appears below. You will not need to use this code after youve completed the sign-up process. If you do not sign up before the expiration date, you must request a new code. · [employer] Access Code: [email or web address] Expires: 9/29/2018 10:28 AM 
 
4. Enter the last four digits of your Social Security Number (xxxx) and Date of Birth (mm/dd/yyyy) as indicated and click Submit. You will be taken to the next sign-up page. 5. Create a [employer] ID. This will be your [employer] login ID and cannot be changed, so think of one that is secure and easy to remember. 6. Create a [employer] password. You can change your password at any time. 7. Enter your Password Reset Question and Answer. This can be used at a later time if you forget your password. 8. Enter your e-mail address. You will receive e-mail notification when new information is available in [facility] [address]. 9. Click Sign Up. You can now view and download portions of your medical record. 10. Click the Download Summary menu link to download a portable copy of your medical information. If you have questions, please visit the Frequently Asked Questions section of the [employer] website. Remember, [employer] is NOT to be used for urgent needs. For medical emergencies, dial 911. Now available from your iPhone and Android! Introducing [employer] As a [facility]ly Duggan patient, I wanted to make you aware of our electronic visit tool called [employer] Slade. [facility] 24/7 allows you to connect within minutes with a medical provider 24 hours a day, seven days a week via a mobile device or tablet or logging into a secure website from your computer. You can access Sabino Slade from anywhere in the [place].  
 
A virtual visit might be right for you when you have a simple condition and feel like you just dont want to get out of bed, or cant get away from work for an appointment, when your regular [employer] provider is not available (evenings, weekends or holidays), or when youre out of town and need minor care. Electronic visits cost only $49 and if the [employer] 24/7 provider determines a prescription is needed to treat your condition, one can be electronically transmitted to a nearby pharmacy*. Please take a moment to enroll today if you have not already done so. The enrollment process is free and takes just a few minutes. To enroll, please download the [employer] 24/7 santos to your tablet or phone, or visit [email or web address] to enroll on your computer. And, as an [address] [place] [address] patient with a [employer] account, the results of your visits will be scanned into your electronic medical record and your primary care provider will be able to view the scanned results. We urge you to continue to see your regular [employer] provider for your ongoing medical care. And while your primary care provider may not be the one available when you seek a [employer] virtual visit, the peace of mind you get from getting a real diagnosis real time can be priceless. For more information on [email or web address] view our Frequently Asked Questions (FAQs) at [email or web address]. Sincerely, 
 
Nury Esquivel MD 
Chief Medical Officer [address] *:  certain medications cannot be prescribed via [email or web address] Providers Seen During Your Hospitalization Provider Specialty Primary office phone Alison Strickland DO Emergency Medicine [phone] William Bond MD Internal Medicine [phone] Sandra Ybarra MD Internal Medicine [phone] Phyllis Martines MD Hospitalist [phone] Your Primary Care Physician (PCP) Primary Care Physician Office Phone Office Fax Adrian Rafa [phone] You are allergic to the following No active allergies Recent Documentation Height Weight BMI Smoking Status 1.753 m 87.1 kg 28.36 kg/m2 Current Every Day Smoker Emergency Contacts Name Discharge Info Relation Home Work Mobile [facility] DISCHARGE CAREGIVER [3] Spouse [3] [phone] Patient Belongings The following personal items are in your possession at time of discharge: 
  Dental Appliances: None  Visual Aid: Glasses, With patient      Home Medications: None   Jewelry: None  Clothing: Hat    Other Valuables: Eyeglasses Please provide this summary of care documentation to your next provider. Signatures-by signing, you are acknowledging that this After Visit Summary has been reviewed with you and you have received a copy. Patient Signature:  ____________________________________________________________ Date:  ____________________________________________________________  
  
[facility] Provider Signature:  ____________________________________________________________ Date:  ____________________________________________________________

## 2018-07-01 NOTE — PROGRESS NOTES
1645: TRANSFER - IN REPORT:    Verbal report received from Segundo Owens RN (name) on Mira Marques.  being received from Emergency Department (unit) for change in patient condition(need for pressors)      Report consisted of patients Situation, Background, Assessment and   Recommendations(SBAR). Information from the following report(s) SBAR, Kardex, Intake/Output, MAR, Recent Results, Cardiac Rhythm Atrial flutter and Alarm Parameters  was reviewed with the receiving nurse. Opportunity for questions and clarification was provided. Assessment completed upon patients arrival to unit and care assumed. 1930: Bedside shift change report given to Elaine Ho RN (oncoming nurse) by Rasheeda Escudero RN (offgoing nurse). Report included the following information SBAR, Kardex, Intake/Output, MAR, Recent Results, Cardiac Rhythm Atrial flutter and Alarm Parameters .

## 2018-07-01 NOTE — ED PROVIDER NOTES
EMERGENCY DEPARTMENT HISTORY AND PHYSICAL EXAM      Date: 7/1/2018  Patient Name: Felipe Deutsch.    History of Presenting Illness     Chief Complaint   Patient presents with   [facility]     pt reports to ed complaining of weakness over the last few days, weak and \"seeing spots and purple\"    Fatigue       History Provided By: Patient and Patient's Friend    HPI: Dalia Torres , 76 y.o. male with PMHx significant for gout and bronchitis, presents ambulatory to the ED with cc of persistent generalized body weakness, fatigue, and vision change that started a couple days ago alongside gout in his bilateral hands. Pt states that he has had gout for the last 15 years and that his gout flared up in his bilateral hands. The pt's friend states that the pt had the gout in his R finger drained recently and states that white stuff came out. The pt's friend reports that the pt was taken off Colcrys since March this year, but in the past 3 days was able to be put back on Colcrys due to insurance related issues. The pt's friend states that the pt had an C XR recently which showed that the pt had bronchitis. The pt's friend informs that the pt's color has changed, and that the pt is seeing spots, has trouble moving, and feels like he could pass out. The pt states that on 6/29/28 he went to get some food, and while crossing the parking lot he felt that he could not make it across. Pt states that he is drinking water and tea. Pt also c/o of pain in his ribs, back, and neck. Pt informs of a FHx of his son dying recently to a heart attack. Pt denies fileing fish before the gout started. Pt informs that he work at a [employer]. Pt endorses smoking tobacco and drinks alcohol.     Chief Complaint: generalized body weakness, fatigue, and vision change  Duration: couple days ago   Timing:  Constant  Location: body, eyes  Quality: N/A  Severity: N/A  Modifying Factors: N/A  Associated Symptoms: gout in his bilateral hands    There are no other complaints, changes, or physical findings at this time. PCP: Zoey Granado MD    Current Facility-Administered Medications   Medication Dose Route Frequency Provider Last Rate Last Dose    sodium chloride (NS) flush 5-10 mL  5-10 mL IntraVENous Q8H Rahul Mile, DO        sodium chloride (NS) flush 5-10 mL  5-10 mL IntraVENous PRN Rahul Mile, DO   10 mL at 07/01/18 1204    vancomycin (VANCOCIN) 1500 mg in  ml infusion  1,500 mg IntraVENous NOW Rahul Mile, DO        piperacillin-tazobactam (ZOSYN) 3.375 g in 0.9% sodium chloride (MBP/ADV) 100 mL  3.375 g IntraVENous NOW Rahul Mile,  mL/hr at 07/01/18 1413 3.375 g at 07/01/18 1413    NOREPINephrine (LEVOPHED) 8 mg in dextrose 5% 250 mL infusion  2-30 mcg/min IntraVENous NOW Rahul Mile, DO         Current Outpatient Prescriptions   Medication Sig Dispense Refill    albuterol (PROAIR HFA) 90 mcg/actuation inhaler Take 2 Puffs by inhalation.  lisinopril (PRINIVIL, ZESTRIL) 10 mg tablet Take 10 mg by mouth daily.  raNITIdine (ZANTAC) 300 mg tablet Take 300 mg by mouth daily.  doxycycline (ADOXA) 100 mg tablet Take 100 mg by mouth two (2) times a day.  colchicine (COLCRYS) 0.6 mg tablet Take 0.6 mg by mouth daily. Past History     Past Medical History:  Past Medical History:   Diagnosis Date    Ill-defined condition     gout       Past Surgical History:  History reviewed. No pertinent surgical history. Family History:  History reviewed. No pertinent family history. Social History:  Social History   Substance Use Topics    Smoking status: Current Every Day Smoker     Packs/day: 1.50    Smokeless tobacco: Former User    Alcohol use 6.0 oz/week     10 Shots of liquor per week       Allergies:  No Known Allergies      Review of Systems   Review of Systems   Constitutional: Positive for activity change (hard to ambulate) and fatigue. Negative for appetite change, chills and fever. HENT: Negative. Negative for congestion, rhinorrhea, sinus pressure and sore throat. Eyes: Positive for visual disturbance (seeing spots). Respiratory: Negative for cough, choking, chest tightness, shortness of breath and wheezing. Cardiovascular: Negative. Negative for chest pain, palpitations and leg swelling. Gastrointestinal: Negative for abdominal pain, constipation, diarrhea, nausea and vomiting. Endocrine: Negative. Genitourinary: Negative. Negative for difficulty urinating, dysuria, flank pain and urgency. Musculoskeletal: Negative. Skin: Positive for color change. Neurological: Positive for weakness (generalized body weakness). Negative for dizziness, speech difficulty, light-headedness, numbness and headaches. +feels like he could pass out     Psychiatric/Behavioral: Negative. All other systems reviewed and are negative. Physical Exam   Physical Exam   Constitutional: He is oriented to person, place, and time. He appears well-developed and well-nourished. Pt pale and ill appearing   HENT:   Head: Normocephalic and atraumatic. Mouth/Throat: Oropharynx is clear and moist. No oropharyngeal exudate. Eyes: Conjunctivae and EOM are normal. Pupils are equal, round, and reactive to light. Neck: Normal range of motion. Neck supple. No JVD present. No tracheal deviation present. Cardiovascular: Normal rate, regular rhythm, normal heart sounds and intact distal pulses. No murmur heard. Distal pulses intact, diminished peripheral   Pulmonary/Chest: Effort normal and breath sounds normal. No stridor. No respiratory distress. He has no wheezes. He has no rales. He exhibits no tenderness. Abdominal: Soft. He exhibits no distension. There is no tenderness. There is no rebound and no guarding. Musculoskeletal: Normal range of motion. He exhibits deformity. He exhibits no edema or tenderness.    Multiple area of chronic joint deformities, primarily of both hands, no erythema or warmth over the joints, no bullae or blistering seen    Neurological: He is alert and oriented to person, place, and time. No cranial nerve deficit. No gross motor or sensory deficits    Skin: Skin is warm and dry. Psychiatric: His behavior is normal.   Flat affect   Nursing note and vitals reviewed. Diagnostic Study Results     Labs -     Recent Results (from the past 12 hour(s))   EKG, 12 LEAD, INITIAL    Collection Time: 07/01/18 10:36 AM   Result Value Ref Range    Ventricular Rate 101 BPM    Atrial Rate 340 BPM    QRS Duration 82 ms    Q-T Interval 342 ms    QTC Calculation (Bezet) 443 ms    Calculated P Axis -108 degrees    Calculated R Axis 9 degrees    Calculated T Axis -55 degrees    Diagnosis       Atrial flutter with variable AV block  Nonspecific ST and T wave abnormality  No previous ECGs available     CBC WITH AUTOMATED DIFF    Collection Time: 07/01/18 11:23 AM   Result Value Ref Range    WBC 14.4 (H) 4.1 - 11.1 K/uL    RBC 4.21 4.10 - 5.70 M/uL    HGB 12.9 12.1 - 17.0 g/dL    HCT 36.9 36.6 - 50.3 %    MCV 87.6 80.0 - 99.0 FL    MCH 30.6 26.0 - 34.0 PG    MCHC 35.0 30.0 - 36.5 g/dL    RDW 14.3 11.5 - 14.5 %    PLATELET 681 926 - 854 K/uL    MPV 10.5 8.9 - 12.9 FL    NRBC 0.0 0  WBC    ABSOLUTE NRBC 0.00 0.00 - 0.01 K/uL    NEUTROPHILS 67 32 - 75 %    LYMPHOCYTES 17 12 - 49 %    MONOCYTES 8 5 - 13 %    EOSINOPHILS 1 0 - 7 %    BASOPHILS 1 0 - 1 %    IMMATURE GRANULOCYTES 6 (H) 0.0 - 0.5 %    ABS. NEUTROPHILS 9.7 (H) 1.8 - 8.0 K/UL    ABS. LYMPHOCYTES 2.4 0.8 - 3.5 K/UL    ABS. MONOCYTES 1.2 (H) 0.0 - 1.0 K/UL    ABS. EOSINOPHILS 0.1 0.0 - 0.4 K/UL    ABS. BASOPHILS 0.1 0.0 - 0.1 K/UL    ABS. IMM.  GRANS. 0.9 (H) 0.00 - 0.04 K/UL    DF AUTOMATED      RBC COMMENTS NORMOCYTIC, NORMOCHROMIC     METABOLIC PANEL, COMPREHENSIVE    Collection Time: 07/01/18 11:23 AM   Result Value Ref Range    Sodium 140 136 - 145 mmol/L    Potassium 5.9 (H) 3.5 - 5.1 mmol/L    Chloride 110 (H) 97 - 108 mmol/L    CO2 20 (L) 21 - 32 mmol/L    Anion gap 10 5 - 15 mmol/L    Glucose 83 65 - 100 mg/dL    BUN 60 (H) 6 - 20 MG/DL    Creatinine 2.21 (H) 0.70 - 1.30 MG/DL    BUN/Creatinine ratio 27 (H) 12 - 20      GFR est AA 36 (L) >60 ml/min/1.73m2    GFR est non-AA 30 (L) >60 ml/min/1.73m2    Calcium 8.9 8.5 - 10.1 MG/DL    Bilirubin, total 0.7 0.2 - 1.0 MG/DL    ALT (SGPT) 46 12 - 78 U/L    AST (SGOT) 23 15 - 37 U/L    Alk.  phosphatase 115 45 - 117 U/L    Protein, total 6.6 6.4 - 8.2 g/dL    Albumin 3.1 (L) 3.5 - 5.0 g/dL    Globulin 3.5 2.0 - 4.0 g/dL    A-G Ratio 0.9 (L) 1.1 - 2.2     CK W/ CKMB & INDEX    Collection Time: 07/01/18 11:23 AM   Result Value Ref Range    CK 30 (L) 39 - 308 U/L    CK - MB 1.1 <3.6 NG/ML    CK-MB Index 3.7 (H) 0 - 2.5     MAGNESIUM    Collection Time: 07/01/18 11:23 AM   Result Value Ref Range    Magnesium 2.0 1.6 - 2.4 mg/dL   PROTHROMBIN TIME + INR    Collection Time: 07/01/18 11:23 AM   Result Value Ref Range    INR 1.0 0.9 - 1.1      Prothrombin time 10.3 9.0 - 11.1 sec   SED RATE (ESR)    Collection Time: 07/01/18 11:23 AM   Result Value Ref Range    Sed rate, automated 75 (H) 0 - 20 mm/hr   C REACTIVE PROTEIN, QT    Collection Time: 07/01/18 11:23 AM   Result Value Ref Range    C-Reactive protein 2.53 (H) 0.00 - 0.60 mg/dL   TROPONIN I    Collection Time: 07/01/18 11:23 AM   Result Value Ref Range    Troponin-I, Qt. <0.05 <0.05 ng/mL   TSH 3RD GENERATION    Collection Time: 07/01/18 11:23 AM   Result Value Ref Range    TSH 3.84 (H) 0.36 - 3.74 uIU/mL   POC LACTIC ACID    Collection Time: 07/01/18  2:07 PM   Result Value Ref Range    Lactic Acid (POC) 1.2 0.4 - 2.0 mmol/L       Radiologic Studies -   XR CHEST PORT   Final Result        CT Results  (Last 48 hours)    None        CXR Results  (Last 48 hours)               07/01/18 1139  XR CHEST PORT Final result    Impression:  IMPRESSION: No acute abnormality allowing for technique               Narrative:  EXAM:  XR CHEST PORT INDICATION:  Hypotension, cough       COMPARISON:  None. FINDINGS: A portable AP radiograph of the chest was obtained at 1129 hours. The   patient is on a cardiac monitor. The lungs are clear. The cardiac and   mediastinal contours and pulmonary vascularity are normal.  The bones and soft   tissues are grossly within normal limits. Medical Decision Making   I am the first provider for this patient. I reviewed the vital signs, available nursing notes, past medical history, past surgical history, family history and social history. Vital Signs-Reviewed the patient's vital signs. Patient Vitals for the past 12 hrs:   Temp Pulse Resp BP SpO2   07/01/18 1415 - 82 26 (!) 87/69 98 %   07/01/18 1400 - 97 22 98/65 98 %   07/01/18 1300 - 82 23 91/55 99 %   07/01/18 1215 98.6 °F (37 °C) 87 16 (!) 83/56 97 %   07/01/18 1145 - 93 22 (!) 80/58 97 %   07/01/18 1130 - 93 24 (!) 75/48 97 %   07/01/18 1100 - 88 17 (!) 84/64 96 %   07/01/18 1027 97.6 °F (36.4 °C) 81 16 (!) 74/43 100 %       Pulse Oximetry Analysis - 100% on RA    Cardiac Monitor:   Rate: 81 bpm  Rhythm: A-flutter, primarily 4:1, but some variability. EKG interpretation: (Preliminary)  Atrial flutter, rate 101, normal access, flutter waves, normal qrs, no acute ST changes, Benay [place], DO      Records Reviewed: Nursing Notes and Old Medical Records    Provider Notes (Medical Decision Making):   DDx: Skin infection, uti, bacteremia, pna    ED Course:   Initial assessment performed. The patients presenting problems have been discussed, and they are in agreement with the care plan formulated and outlined with them. I have encouraged them to ask questions as they arise throughout their visit. Initially patient presented afebrile but tachycardic and hypotensive. IV fluid bolus started. CXR obtained, no pulmonary edema noted, no infiltrate, urinalysis no infection noted.  Given recent pt clinical hx, sepsis of unknown origin concer for bacteremia give recent pt I and D self. Pt also recent on steroids, ? Adrenal crisis. Given sepsis of unknown origin, ordered Zosyn, Levaquin and Vancomycin. 1515 Sepsis reassessment:  Pt awake, alert, skin color improved, pt with rigors, HR: 87, RR 21- Lungs CTAB, BP 92/71, MAP > 65, but concern for possible hypotension. Cap refill 3 secs. Discussed with pt and spouse, will proceed with CVL placement given persistent hypotension with elevated WBC count with bandemia. Unfortunately intial lactate  Drawn but not sent to lab. Intial lactate obtained post Fluid resuscitation. CRITICAL CARE NOTE :  2:51 PM  IMPENDING DETERIORATION -Cardiovascular, CNS and Renal  ASSOCIATED RISK FACTORS - Hypotension, Shock, Dysrhythmia, Metabolic changes and Dehydration- Aflutter, Hypotension  MANAGEMENT- Bedside Assessment and Supervision of Care  INTERPRETATION -  Xrays, ECG, Blood Pressure and Cardiac Output Measures   INTERVENTIONS - hemodynamic mngmt- IV fluids, broad spectrum Ab  CASE REVIEW - Hospitalist, Nursing and Family  TREATMENT RESPONSE -Improved  PERFORMED BY - Self  NOTES   :  I have spent 60 minutes of critical care time involved in lab review, consultations with specialist, family decision- making, bedside attention and documentation. During this entire length of time I was immediately available to the patient . Critical Care Time: 0 minutes    Disposition:  2:31 PM  Patient is being admitted to the hospital by Dr. Cassidy Kaye. The results of their tests and reasons for their admission have been discussed with them and/or available family. They convey agreement and understanding for the need to be admitted and for their admission diagnosis. Consultation has been made with the inpatient physician specialist for hospitalization. Written by TAMIKO Celaya, as dictated by Kathia Vasquez DO.     Procedure Note- CVL access  4:00 PM  Performed by: Piter Aguiar DO  Gained IV access using  20 gauge needle because the patient had no vascular access. After cleaning the site with cholraprep, the Right IJ vein was localized with ultrasound guidance in an anterior approach. Lidocaine 1% plain, 4ccs used to anesthetize site. Line confirmation was obtained by direct visualization and good blood return. IJ flat, CXR obtained to confirm. ESBL= 0. The line was successfully flushed with normal saline and was secured with transparent tape and suture. Estimated blood loss: 4 cc's  The procedure took 1-15 minutes, and pt tolerated well. Admitted to hospital    Diagnosis     Clinical Impression:   1. Septic shock ([address]    2. Gouty arthritis    3. Atrial flutter with controlled response ([facility] [address]    4. Acute renal failure, unspecified acute renal failure type [facility])        Attestations: This note is prepared by Caterina Palomo, acting as Scribe for Stephanie Aguirre, [address] [place] [address], DO: The scribe's documentation has been prepared under my direction and personally reviewed by me in its entirety. I confirm that the note above accurately reflects all work, treatment, procedures, and medical decision making performed by me.

## 2018-07-01 NOTE — ED NOTES
TRANSFER - OUT REPORT:    Verbal report given to Neena Curtis on Erlin Matos Sr.  being transferred to CCU (unit) for routine progression of care       Report consisted of patients Situation, Background, Assessment and   Recommendations(SBAR). Information from the following report(s) SBAR, ED Summary and Recent Results was reviewed with the receiving nurse. Lines:   Triple Lumen Central line 07/01/18 Right Neck (Active)   Central Line Being Utilized Yes 7/1/2018  4:23 PM   Site Assessment Clean, dry, & intact 7/1/2018  4:23 PM   Dressing Status Clean, dry, & intact 7/1/2018  4:23 PM   Dressing Type Transparent 7/1/2018  4:23 PM       Peripheral IV 07/01/18 Left Antecubital (Active)   Site Assessment Clean, dry, & intact 7/1/2018 11:21 AM   Phlebitis Assessment 0 7/1/2018 11:21 AM   Infiltration Assessment 0 7/1/2018 11:21 AM   Dressing Status Clean, dry, & intact 7/1/2018 11:21 AM   Dressing Type Transparent 7/1/2018 11:21 AM   Hub Color/Line Status Pink;Flushed 7/1/2018 11:21 AM   Action Taken Blood drawn 7/1/2018 11:21 AM        Opportunity for questions and clarification was provided.       Patient transported with:   Monitor  Registered Nurse

## 2018-07-01 NOTE — ED NOTES
Pt resting on stretcher in POC with call bell in reach and family at bedside. Pt remains on monitor x 3. VSS at this time.

## 2018-07-02 ENCOUNTER — APPOINTMENT (OUTPATIENT)
Dept: NUCLEAR MEDICINE | Age: 68
DRG: 853 | End: 2018-07-02
Attending: INTERNAL MEDICINE
Payer: COMMERCIAL

## 2018-07-02 LAB
ANION GAP SERPL CALC-SCNC: 8 MMOL/L (ref 5–15)
ATRIAL RATE: 340 BPM
BASOPHILS # BLD: 0 K/UL (ref 0–0.1)
BASOPHILS NFR BLD: 0 % (ref 0–1)
BUN SERPL-MCNC: 40 MG/DL (ref 6–20)
BUN/CREAT SERPL: 25 (ref 12–20)
C DIFF TOX GENS STL QL NAA+PROBE: NEGATIVE
CALCIUM SERPL-MCNC: 8 MG/DL (ref 8.5–10.1)
CALCULATED P AXIS, ECG09: -108 DEGREES
CALCULATED R AXIS, ECG10: 9 DEGREES
CALCULATED T AXIS, ECG11: -55 DEGREES
CHLORIDE SERPL-SCNC: 116 MMOL/L (ref 97–108)
CO2 SERPL-SCNC: 19 MMOL/L (ref 21–32)
CREAT SERPL-MCNC: 1.57 MG/DL (ref 0.7–1.3)
DIAGNOSIS, 93000: NORMAL
DIFFERENTIAL METHOD BLD: ABNORMAL
EOSINOPHIL # BLD: 0 K/UL (ref 0–0.4)
EOSINOPHIL NFR BLD: 0 % (ref 0–7)
ERYTHROCYTE [DISTWIDTH] IN BLOOD BY AUTOMATED COUNT: 13.9 % (ref 11.5–14.5)
GLUCOSE SERPL-MCNC: 113 MG/DL (ref 65–100)
HCT VFR BLD AUTO: 29.3 % (ref 36.6–50.3)
HGB BLD-MCNC: 10.4 G/DL (ref 12.1–17)
IMM GRANULOCYTES # BLD: 0 K/UL (ref 0–0.04)
IMM GRANULOCYTES NFR BLD AUTO: 0 % (ref 0–0.5)
LYMPHOCYTES # BLD: 0.6 K/UL (ref 0.8–3.5)
LYMPHOCYTES NFR BLD: 6 % (ref 12–49)
MCH RBC QN AUTO: 31 PG (ref 26–34)
MCHC RBC AUTO-ENTMCNC: 35.5 G/DL (ref 30–36.5)
MCV RBC AUTO: 87.5 FL (ref 80–99)
METAMYELOCYTES NFR BLD MANUAL: 2 %
MONOCYTES # BLD: 0.4 K/UL (ref 0–1)
MONOCYTES NFR BLD: 4 % (ref 5–13)
NEUTS BAND NFR BLD MANUAL: 1 %
NEUTS SEG # BLD: 8.4 K/UL (ref 1.8–8)
NEUTS SEG NFR BLD: 87 % (ref 32–75)
NRBC # BLD: 0 K/UL (ref 0–0.01)
NRBC BLD-RTO: 0 PER 100 WBC
PLATELET # BLD AUTO: 162 K/UL (ref 150–400)
PMV BLD AUTO: 10 FL (ref 8.9–12.9)
POTASSIUM SERPL-SCNC: 5.5 MMOL/L (ref 3.5–5.1)
Q-T INTERVAL, ECG07: 342 MS
QRS DURATION, ECG06: 82 MS
QTC CALCULATION (BEZET), ECG08: 443 MS
RBC # BLD AUTO: 3.35 M/UL (ref 4.1–5.7)
RBC MORPH BLD: ABNORMAL
SODIUM SERPL-SCNC: 143 MMOL/L (ref 136–145)
VENTRICULAR RATE, ECG03: 101 BPM
WBC # BLD AUTO: 9.6 K/UL (ref 4.1–11.1)

## 2018-07-02 PROCEDURE — 74011250637 HC RX REV CODE- 250/637: Performed by: INTERNAL MEDICINE

## 2018-07-02 PROCEDURE — 74011000258 HC RX REV CODE- 258: Performed by: INTERNAL MEDICINE

## 2018-07-02 PROCEDURE — 74011250636 HC RX REV CODE- 250/636: Performed by: INTERNAL MEDICINE

## 2018-07-02 PROCEDURE — 85025 COMPLETE CBC W/AUTO DIFF WBC: CPT | Performed by: INTERNAL MEDICINE

## 2018-07-02 PROCEDURE — 93306 TTE W/DOPPLER COMPLETE: CPT

## 2018-07-02 PROCEDURE — 74011250637 HC RX REV CODE- 250/637: Performed by: HOSPITALIST

## 2018-07-02 PROCEDURE — 80048 BASIC METABOLIC PNL TOTAL CA: CPT | Performed by: INTERNAL MEDICINE

## 2018-07-02 PROCEDURE — 65660000000 HC RM CCU STEPDOWN

## 2018-07-02 PROCEDURE — 36415 COLL VENOUS BLD VENIPUNCTURE: CPT | Performed by: INTERNAL MEDICINE

## 2018-07-02 RX ORDER — VANCOMYCIN HYDROCHLORIDE
1250
Status: DISCONTINUED | OUTPATIENT
Start: 2018-07-02 | End: 2018-07-04

## 2018-07-02 RX ORDER — COLCHICINE 0.6 MG/1
0.6 TABLET ORAL DAILY
Status: DISCONTINUED | OUTPATIENT
Start: 2018-07-02 | End: 2018-07-07 | Stop reason: HOSPADM

## 2018-07-02 RX ORDER — ASPIRIN 325 MG/1
100 TABLET, FILM COATED ORAL DAILY
Status: DISCONTINUED | OUTPATIENT
Start: 2018-07-02 | End: 2018-07-07 | Stop reason: HOSPADM

## 2018-07-02 RX ORDER — FOLIC ACID 1 MG/1
1 TABLET ORAL DAILY
Status: DISCONTINUED | OUTPATIENT
Start: 2018-07-02 | End: 2018-07-07 | Stop reason: HOSPADM

## 2018-07-02 RX ORDER — CHLORDIAZEPOXIDE HYDROCHLORIDE 5 MG/1
5 CAPSULE, GELATIN COATED ORAL
Status: DISCONTINUED | OUTPATIENT
Start: 2018-07-02 | End: 2018-07-07 | Stop reason: HOSPADM

## 2018-07-02 RX ORDER — THERA TABS 400 MCG
1 TAB ORAL DAILY
Status: DISCONTINUED | OUTPATIENT
Start: 2018-07-02 | End: 2018-07-07 | Stop reason: HOSPADM

## 2018-07-02 RX ADMIN — Medication 10 ML: at 05:03

## 2018-07-02 RX ADMIN — VANCOMYCIN HYDROCHLORIDE 1250 MG: 10 INJECTION, POWDER, LYOPHILIZED, FOR SOLUTION INTRAVENOUS at 14:22

## 2018-07-02 RX ADMIN — Medication 100 MG: at 13:04

## 2018-07-02 RX ADMIN — HEPARIN SODIUM 5000 UNITS: 5000 INJECTION, SOLUTION INTRAVENOUS; SUBCUTANEOUS at 01:39

## 2018-07-02 RX ADMIN — HEPARIN SODIUM 5000 UNITS: 5000 INJECTION, SOLUTION INTRAVENOUS; SUBCUTANEOUS at 09:02

## 2018-07-02 RX ADMIN — THERA TABS 1 TABLET: TAB at 13:04

## 2018-07-02 RX ADMIN — Medication 10 ML: at 13:04

## 2018-07-02 RX ADMIN — HYDROCORTISONE SODIUM SUCCINATE 100 MG: 100 INJECTION, POWDER, FOR SOLUTION INTRAMUSCULAR; INTRAVENOUS at 05:03

## 2018-07-02 RX ADMIN — HEPARIN SODIUM 5000 UNITS: 5000 INJECTION, SOLUTION INTRAVENOUS; SUBCUTANEOUS at 18:00

## 2018-07-02 RX ADMIN — FOLIC ACID 1 MG: 1 TABLET ORAL at 13:04

## 2018-07-02 RX ADMIN — MUPIROCIN: 20 OINTMENT TOPICAL at 09:02

## 2018-07-02 RX ADMIN — Medication 10 ML: at 21:19

## 2018-07-02 RX ADMIN — HYDROCORTISONE SODIUM SUCCINATE 100 MG: 100 INJECTION, POWDER, FOR SOLUTION INTRAMUSCULAR; INTRAVENOUS at 21:18

## 2018-07-02 RX ADMIN — CEFEPIME HYDROCHLORIDE 2 G: 2 INJECTION, POWDER, FOR SOLUTION INTRAVENOUS at 04:55

## 2018-07-02 RX ADMIN — SODIUM CHLORIDE 125 ML/HR: 900 INJECTION, SOLUTION INTRAVENOUS at 03:11

## 2018-07-02 RX ADMIN — COLCHICINE 0.6 MG: 0.6 TABLET, FILM COATED ORAL at 13:04

## 2018-07-02 RX ADMIN — MUPIROCIN: 20 OINTMENT TOPICAL at 21:21

## 2018-07-02 RX ADMIN — CEFEPIME HYDROCHLORIDE 2 G: 2 INJECTION, POWDER, FOR SOLUTION INTRAVENOUS at 17:53

## 2018-07-02 RX ADMIN — HYDROCORTISONE SODIUM SUCCINATE 100 MG: 100 INJECTION, POWDER, FOR SOLUTION INTRAMUSCULAR; INTRAVENOUS at 14:22

## 2018-07-02 NOTE — CONSULTS
After Visit Summary   2/28/2017    Louie Greco    MRN: [id number]           Patient Information     Date Of Birth          1960        Visit Information        Provider Department      2/28/2017 9:15 AM Agueda Manley MD [facility]        Today's Diagnoses     Malignant neoplasm of rectum (H)    -  1      Care Instructions    -C4 of chemotherapy today, pending labs  -return to clinic in 2 weeks with scan results and chemotherapy as scheduled        Follow-ups after your visit        Your next 10 appointments already scheduled     Mar 02, 2017  2:30 PM CST   Level 2 with SOUTH CHAIR 3   [facility] ([facility])    [facility]  [address]  [facility] [address]   [phone]            Mar 06, 2017  3:15 PM CST   RETURN RETINA with Yara Smalls MD   [facility] ([facility])    [address]  [address] [facility]  [address] [address]  [facility] [id number] [phone]            Mar 13, 2017  3:00 PM CDT   (Arrive by 2:45 PM)   CT ABDOMEN W CONTRAST with UCCT1   [facility] ([facility])    [address] [facility]  [address] [facility] [id number] [phone]           Please bring any scans or X-rays taken at other hospitals, if similar tests were done. Also bring a list of your medicines, including vitamins, minerals and over-the-counter drugs. It is safest to leave personal items at home.  Be sure to tell your doctor:   If you have any allergies.   If there s any chance you are pregnant.   If you are breastfeeding.   If you have any special needs.  You may have contrast for this exam. To prepare:   Do not eat or drink for 2 hours before your exam. If you need to take medicine, you may take it with small sips of water. (We may ask you to take liquid medicine as well.)   The day before your exam, drink extra fluids at  Consult/Admission    NAME: Elizabeth Stephens Sr.   :  1950   MRN:  [id number]     Date/Time:  2018 4:59 PM    Patient PCP: J Carlos Mejia MD  ________________________________________________________________________     Assessment:     New onset Atrial Flutter. Rate controlled. ChadsVasc score 1. Sepsis. Chronic tobacco abuse. COPD  Tophaceous gout, with acute exacerbation. Alcohol use 2/d . Plan:     Can use meds for rate control as needed. Will follow him after discharge and if Flutter persists, plan CV as OP       [x]           High complexity decision making was performed        Subjective:   CHIEF COMPLAINT:  Weak     HISTORY OF PRESENT ILLNESS:     Ananya Bolden is a 76 y.o.  male who is admitted with sepsis. Found to have A Flutter, controlled rate. He has no hx of this, no hx of heart disease. We were asked see for evaluation of the above problems. Past Medical History:   Diagnosis Date    Ill-defined condition     gout      History reviewed. No pertinent surgical history. No Known Allergies   Meds:  See below  Social History   Substance Use Topics    Smoking status: Current Every Day Smoker     Packs/day: 1.50    Smokeless tobacco: Former User    Alcohol use 6.0 oz/week     10 Shots of liquor per week      History reviewed. No pertinent family history.     REVIEW OF SYSTEMS:     []            Unable to obtain  ROS due to ---   [x]            Total of 12 systems reviewed as follows:    Constitutional: negative fever, negative chills, negative weight loss  Eyes:   negative visual changes  ENT:   negative sore throat, tongue or lip swelling  Respiratory:  negative cough, negative dyspnea  Cards:  negative for chest pain, palpitations, lower extremity edema  GI:   negative for nausea, vomiting, diarrhea, and abdominal pain  Genitourinary: negative for frequency, dysuria  Integument:  negative for rash   Hematologic:  negative for easy bruising and gum/nose bleeding  Musculoskel: negative for myalgias,  back pain  Neurological:  negative for headaches, dizziness, vertigo, weakness  Behavl/Psych: negative for feelings of anxiety, depression     Pertinent Positives include :    Objective:      Physical Exam:    Last 24hrs VS reviewed since prior progress note. Most recent are:    Visit Vitals    /63 (BP 1 Location: Left arm, BP Patient Position: At rest)    Pulse 83    Temp 98.3 °F (36.8 °C)    Resp 20    Ht 5' 9\" (1.753 m)    Wt 83.9 kg (184 lb 15.5 oz)    SpO2 98%    BMI 27.31 kg/m2       Intake/Output Summary (Last 24 hours) at 07/02/18 1659  Last data filed at 07/02/18 1600   Gross per 24 hour   Intake          5497.74 ml   Output             3150 ml   Net          2347.74 ml        General Appearance: Well developed, well nourished, alert & oriented x 3,    no acute distress. Ears/Nose/Mouth/Throat: Pupils equal and round, Hearing grossly normal.  Neck: Supple. JVP within normal limits. Carotids good upstrokes, with no bruit. Chest: Lungs clear to auscultation bilaterally. Cardiovascular: Regular rate and rhythm, S1S2 normal, no murmur, rubs, gallops. Abdomen: Soft, non-tender, bowel sounds are active. No organomegaly. Extremities: No edema bilaterally. Femoral pulses +2, Distal Pulses +1. Skin: Warm and dry. Neuro: CN II-XII grossly intact, Strength and sensation grossly intact. Data:      Prior to Admission medications    Medication Sig Start Date End Date Taking? Authorizing Provider   albuterol (PROAIR HFA) 90 mcg/actuation inhaler Take 2 Puffs by inhalation. Yes Javy Dove MD   lisinopril (PRINIVIL, ZESTRIL) 10 mg tablet Take 10 mg by mouth daily. Yes Javy Dove MD   raNITIdine (ZANTAC) 300 mg tablet Take 300 mg by mouth daily. Yes Javy Dove MD   colchicine (COLCRYS) 0.6 mg tablet Take 0.6 mg by mouth daily.    Yes Javy Dove MD       Recent Results (from the past 24 hour(s))   LACTIC ACID    Collection Time: least six 8-ounce glasses (unless your doctor tells you to restrict your fluids).  Patients over 70 or patients with diabetes or kidney problems:   If you haven t had a blood test (creatinine test) within the last 30 days, go to your clinic or Diagnostic Imaging Department for this test.  If you have diabetes:   If your kidney function is normal, continue taking your metformin (Avandamet, Glucophage, Glucovance, Metaglip) on the day of your exam.   If your kidney function is abnormal, wait 48 hours before restarting this medicine.  You will have oral contrast for this exam:   You will drink the contrast at home. Get this from your clinic or Diagnostic Imaging Department. Please follow the directions given.  Please wear loose clothing, such as a sweat suit or jogging clothes. Avoid snaps, zippers and other metal. We may ask you to undress and put on a hospital gown.  If you have any questions, please call the Imaging Department where you will have your exam.            Mar 13, 2017  3:20 PM CDT   (Arrive by 3:05 PM)   CT CHEST W CONTRAST with UCCT1   [facility] ([email or web address])    [address] [address] [phone]           Please bring any scans or X-rays taken at other hospitals, if similar tests were done. Also bring a list of your medicines, including vitamins, minerals and over-the-counter drugs. It is safest to leave personal items at home.  Be sure to tell your doctor:   If you have any allergies.   If there s any chance you are pregnant.   If you are breastfeeding.   If you have any special needs.  You will have contrast for this exam. To prepare:   Do not eat or drink for 2 hours before your exam. If you need to take medicine, you may take it with small sips of water. (We may ask you to take liquid medicine as well.)   The day before your exam, drink extra fluids at least six 8-ounce glasses (unless your doctor tells you to restrict  07/01/18  6:22 PM   Result Value Ref Range    Lactic acid 0.9 0.4 - 2.0 MMOL/L   MAGNESIUM    Collection Time: 07/01/18  6:22 PM   Result Value Ref Range    Magnesium 1.7 1.6 - 2.4 mg/dL   PROTHROMBIN TIME + INR    Collection Time: 07/01/18  6:22 PM   Result Value Ref Range    INR 1.1 0.9 - 1.1      Prothrombin time 11.1 9.0 - 11.1 sec   URINALYSIS W/ RFLX MICROSCOPIC    Collection Time: 07/01/18  8:51 PM   Result Value Ref Range    Color YELLOW/STRAW      Appearance CLEAR CLEAR      Specific gravity 1.012 1.003 - 1.030      pH (UA) 5.0 5.0 - 8.0      Protein NEGATIVE  NEG mg/dL    Glucose 100 (A) NEG mg/dL    Ketone NEGATIVE  NEG mg/dL    Bilirubin NEGATIVE  NEG      Blood NEGATIVE  NEG      Urobilinogen 0.2 0.2 - 1.0 EU/dL    Nitrites NEGATIVE  NEG      Leukocyte Esterase NEGATIVE  NEG     INFLUENZA A & B AG (RAPID TEST)    Collection Time: 07/01/18  8:51 PM   Result Value Ref Range    Influenza A Antigen NEGATIVE  NEG      Influenza B Antigen NEGATIVE  NEG     C. DIFFICILE (DNA)    Collection Time: 07/01/18  9:00 PM   Result Value Ref Range    C. difficile (DNA) NEGATIVE  NEG     METABOLIC PANEL, BASIC    Collection Time: 07/02/18  4:52 AM   Result Value Ref Range    Sodium 143 136 - 145 mmol/L    Potassium 5.5 (H) 3.5 - 5.1 mmol/L    Chloride 116 (H) 97 - 108 mmol/L    CO2 19 (L) 21 - 32 mmol/L    Anion gap 8 5 - 15 mmol/L    Glucose 113 (H) 65 - 100 mg/dL    BUN 40 (H) 6 - 20 MG/DL    Creatinine 1.57 (H) 0.70 - 1.30 MG/DL    BUN/Creatinine ratio 25 (H) 12 - 20      GFR est AA 54 (L) >60 ml/min/1.73m2    GFR est non-AA 44 (L) >60 ml/min/1.73m2    Calcium 8.0 (L) 8.5 - 10.1 MG/DL   CBC WITH AUTOMATED DIFF    Collection Time: 07/02/18  4:52 AM   Result Value Ref Range    WBC 9.6 4.1 - 11.1 K/uL    RBC 3.35 (L) 4.10 - 5.70 M/uL    HGB 10.4 (L) 12.1 - 17.0 g/dL    HCT 29.3 (L) 36.6 - 50.3 %    MCV 87.5 80.0 - 99.0 FL    MCH 31.0 26.0 - 34.0 PG    MCHC 35.5 30.0 - 36.5 g/dL    RDW 13.9 11.5 - 14.5 %    PLATELET 141 your fluids).  Patients over 70 or patients with diabetes or kidney problems:   If you haven t had a blood test (creatinine test) within the last 30 days, go to your clinic or Diagnostic Imaging Department for this test.  If you have diabetes:   If your kidney function is normal, continue taking your metformin (Avandamet, Glucophage, Glucovance, Metaglip) on the day of your exam.   If your kidney function is abnormal, wait 48 hours before restarting this medicine.  Please wear loose clothing, such as a sweat suit or jogging clothes. Avoid snaps, zippers and other metal. We may ask you to undress and put on a hospital gown.  If you have any questions, please call the Imaging Department where you will have your exam.            Mar 13, 2017  4:00 PM CDT   (Arrive by 3:45 PM)   MR PELVIS W/O & W CONTRAST with [id number]   [facility] ([facility] [place])    [address] [address] [phone]           Take your medicines as usual, unless your doctor tells you not to. Bring a list of your current medicines to your exam (including vitamins, minerals and over-the-counter drugs).  You will be given intravenous contrast for this exam. To prepare:   The day before your exam, drink extra fluids at least six 8-ounce glasses (unless your doctor tells you to restrict your fluids).   Have a blood test (creatinine test) within 30 days of your exam. Go to your clinic or Diagnostic Imaging Department for this test.  The MRI machine uses a strong magnet. Please wear clothes without metal (snaps, zippers). A sweatsuit works well, or we may give you a hospital gown.  Please remove any body piercings and hair extensions before you arrive. You will also remove watches, jewelry, hairpins, wallets, dentures, partial dental plates and hearing aids. You may wear contact lenses, and you may be able to wear your rings. We have a safe place to keep your personal items, but it  150 - 400 K/uL    MPV 10.0 8.9 - 12.9 FL    NRBC 0.0 0  WBC    ABSOLUTE NRBC 0.00 0.00 - 0.01 K/uL    NEUTROPHILS 87 (H) 32 - 75 %    BAND NEUTROPHILS 1 %    LYMPHOCYTES 6 (L) 12 - 49 %    MONOCYTES 4 (L) 5 - 13 %    EOSINOPHILS 0 0 - 7 %    BASOPHILS 0 0 - 1 %    METAMYELOCYTES 2 %    IMMATURE GRANULOCYTES 0 0.0 - 0.5 %    ABS. NEUTROPHILS 8.4 (H) 1.8 - 8.0 K/UL    ABS. LYMPHOCYTES 0.6 (L) 0.8 - 3.5 K/UL    ABS. MONOCYTES 0.4 0.0 - 1.0 K/UL    ABS. EOSINOPHILS 0.0 0.0 - 0.4 K/UL    ABS. BASOPHILS 0.0 0.0 - 0.1 K/UL    ABS. IMM.  GRANS. 0.0 0.00 - 0.04 K/UL    DF MANUAL      RBC COMMENTS NORMOCYTIC, NORMOCHROMIC is safer to leave them at home.   **IMPORTANT** THE INSTRUCTIONS BELOW ARE ONLY FOR THOSE PATIENTS WHO HAVE BEEN TOLD THEY WILL RECEIVE SEDATION OR GENERAL ANESTHESIA DURING THEIR MRI PROCEDURE:  IF YOU WILL RECEIVE SEDATION (take medicine to help you relax during your exam):   You must get the medicine from your doctor before you arrive. Bring the medicine to the exam. Do not take it at home.   Arrive one hour early. Bring someone who can take you home after the test. Your medicine will make you sleepy. After the exam, you may not drive, take a bus or take a taxi by yourself.   No eating 8 hours before your exam. You may have clear liquids up until 4 hours before your exam. (Clear liquids include water, clear tea, black coffee and fruit juice without pulp.)  IF YOU WILL RECEIVE ANESTHESIA (be asleep for your exam):   Arrive 1 1/2 hours early. Bring someone who can take you home after the test. You may not drive, take a bus or take a taxi by yourself.   No eating 8 hours before your exam. You may have clear liquids up until 4 hours before your exam. (Clear liquids include water, clear tea, black coffee and fruit juice without pulp.)  Please call the Imaging Department at your exam site with any questions.            Mar 14, 2017  9:00 AM CDT   Level 5 with NORTH CHAIR 5   [facility] ([facility])    [facility]  [address]  [place] [address]   [phone]            Mar 14, 2017  9:30 AM CDT   Return Visit with Agueda Manley MD   [facility] ([facility])    [facility]  [address]  [place] [address]   [phone]            Mar 28, 2017  8:30 AM CDT   Level 5 with NORTH CHAIR 9   [facility] ([facility])    [facility]  [address]  [place] [address]   [phone]             Mar 30, 2017  9:00 AM CDT   Return Visit with Roxann Guzman GC   [facility] ([facility])    [facility] [place]  [address]  [place] [address] [phone]              Who to contact     If you have questions or need follow up information about today's clinic visit or your schedule please contact [facility] directly at [phone].  Normal or non-critical lab and imaging results will be communicated to you by Netlisthart, letter or phone within 4 business days after the clinic has received the results. If you do not hear from us within 7 days, please contact the clinic through [employer] or phone. If you have a critical or abnormal lab result, we will notify you by phone as soon as possible.  Submit refill requests through Zeel or call your pharmacy and they will forward the refill request to us. Please allow 3 business days for your refill to be completed.          Additional Information About Your Visit        Zeel Information     Zeel gives you secure access to your electronic health record. If you see a primary care provider, you can also send messages to your care team and make appointments. If you have questions, please call your primary care clinic.  If you do not have a primary care provider, please call [phone] and they will assist you.        Care EveryWhere ID     This is your Care EveryWhere ID. This could be used by other organizations to access your [facility] medical records  [id number]        Your Vitals Were     Pulse Temperature Respirations Pulse Oximetry BMI (Body Mass Index)       105 98.3  F (36.8  C) (Oral) 16 97% 30.45 kg/m2        Blood Pressure from Last 3 Encounters:   02/28/17 107/76   02/16/17 127/85   02/14/17 117/80    Weight from Last 3 Encounters:   02/28/17 96.3 kg (212 lb 3.2 oz)   02/28/17 96.3 kg (212 lb 3.2 oz)   02/14/17 96.9 kg (213 lb 9.6 oz)              Today, you had the following      No orders found for display       Primary Care Provider Office Phone # Fax #    Ayden Peralta [phone]       [facility] [address] [place] [id number]        Thank you!     Thank you for choosing [facility]  for your care. Our goal is always to provide you with excellent care. Hearing back from our patients is one way we can continue to improve our services. Please take a few minutes to complete the written survey that you may receive in the mail after your visit with us. Thank you!             Your Updated Medication List - Protect others around you: Learn how to safely use, store and throw away your medicines at www.disposemymeds.org.          This list is accurate as of: 2/28/17 10:40 AM.  Always use your most recent med list.                   Brand Name Dispense Instructions for use    acetaminophen 325 MG tablet    TYLENOL    100 tablet    Take 2 tablets (650 mg) by mouth every 4 hours as needed for other (mild pain)       COLACE PO          diltiazem 2% in PLO cream (FV COMPOUNDED) 2% Gel     60 g    To anal opening three times daily.  Use a pea-sized amount.  Store at room temperature.       hydrocortisone 0.5 % ointment      Apply topically 2 times daily Reported on 2/14/2017       IBUPROFEN PO          LORazepam 0.5 MG tablet    ATIVAN    30 tablet    Take 1 tablet (0.5 mg) by mouth every 4 hours as needed (Anxiety, Nausea/Vomiting or Sleep)       metoclopramide 10 MG tablet    REGLAN    30 tablet    Take 1 tablet (10 mg) by mouth 4 times daily (before meals and nightly)       ondansetron 8 MG tablet    ZOFRAN    10 tablet    Take 1 tablet (8 mg) by mouth every 8 hours as needed (Nausea/Vomiting)       prochlorperazine 10 MG tablet    COMPAZINE    30 tablet    Take 1 tablet (10 mg) by mouth every 6 hours as needed (Nausea/Vomiting)

## 2018-07-02 NOTE — INTERDISCIPLINARY ROUNDS
Interdisciplinary team rounds were held 7/2/2018 with the following team members:Care Management, Diabetes Treatment Specialist, Nursing, Nutrition, Pharmacy, Physical Therapy, Physician and Respiratory Therapy. Plan of care discussed. See clinical pathway and/or care plan for interventions and desired outcomes.

## 2018-07-02 NOTE — CONSULTS
[facility]OC[facility] INTENSIVIST Consult Service Note  Pulmonary, Critical Care, and Sleep Medicine    Name: Zoran Ordonez Sr. MRN: [id number]   : 1950 Hospital: [facility]   Date: 2018   Hospital Day: 2       Subjective/Interval History:   I have reviewed the notes from other providers and old records readily available and summarized findings below with the flowsheet. Seen earlier today on rounds. Pt is unstable and acutely ill in the CCU. Patient Active Problem List   Diagnosis Code    Severe sepsis (HCC) A41.9, R65.20       IMPRESSION:   1. Severe sepsis with septic shock etiology? 2. Atrial flutter  / Tachycardia now slower  3. Possible COPD - due to 1/5 ppd smoking for over 40 years and clinical examination highly suggestive of COPD without acute exacerbation   4. Acute uncontrolled Tophaceous gout - pt had punctured several of the tophi in his hand recently- cannot get meds that help  5. Hx of squamous cell CA on various parts of his body s/p blue light Rx. 6. Heavy Tobacco abuse history   7. Heavy ETOH abuse history - One pint of whiskey every two days- at high risk for withdrawal  8. Body mass index is 26.96 kg/(m^2). 9. Additional workup outlined below  10. Multiorgan dysfunction as outlined above: Pt has one or more acute or chronic illnesses with severe exacerbation with progression or side effects of treatment that poses a threat to life or bodily function  11. Pt is unstable, unpredictable needing PCU/ CCU monitoring;   12. Pt is critically ill and at high risk of sudden decline and decompensation with life threatening consequenses and continued end organ dysfunction and failure      RECOMMENDATIONS/PLAN:   1. May transfer to telemetry  2. Colchicine- Insurance would not cover this  3. Mobilize  4. Agree with IV abx  5. Wound care consult  6. Nephrology to see  7. Follow cultures  8. Pt education  9. Monitor for withdrawal  10.  Labs to follow electrolytes, renal function and and blood counts  11. Smoking cessation counseling  12. Bronchial hygiene with respiratory therapy techniques, bronchodilators  13. Pt needs IV fluids with additives and Drug therapy requiring intensive monitoring for toxicity  14. Prescription drug management with home med reconciliation reviewed  15. DVT, SUP prophylaxis  16. Will be available to assist in medical management while in the CCU pending disposition     My assessment/management discussed with: Nursing, Pharmacy, Case Management, PT, OT, Respiratory Therapy, Hospitalist and Family for coordination of care      Subjective/Initial History:   I have reviewed the flowsheet and previous days notes. Seen earlier today on rounds. I was asked by Natalya Hopkins MD to see Osmin Bentley . a 76 y.o.  male  in consultation for a chief complaint of sepsis with shock. Excerpts from admission and consult notes reviewed as follows: \"The pt was 'fine ' about two weeks ago. He developed a cough with some feverish episodes for which his PCP prescribed steroids PO, PO doxycycline. The pt states that after taking those medicines for three to four days he developed tremulousness and felt worse. Therefore, he stopped taking those medicines. He continued to feel unwell. During this time he continues with his work and at work about a week ago he felt lightheaded and noticed blind spots and he was unable to move due to acute weakness. He took time off ( I believe) and spent resting at home last few days. He didn't listen to his wife's request and didn't come to ER when he felt unwell last Monday. This morning he was brought to ER and since then workup has revealed above findings. 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  \"    Frustrated because Britton Aguero trying to treat gout but insurance company fails to cover any of the meds. Punctured tophae on knuckles. Weaned off pressors. PMH:  has a past medical history of Ill-defined condition. PSH:   has no past surgical history on file. FHX: family history is not on file. SHX:  reports that he has been smoking. He has been smoking about 1.50 packs per day. He has quit using smokeless tobacco. He reports that he drinks about 6.0 oz of alcohol per week  He reports that he does not use illicit drugs. ROS:A comprehensive review of systems was negative except for that written in the HPI.       No Known Allergies Medications:      MEDS:   Current Facility-Administered Medications   Medication    sodium chloride (NS) flush 5-10 mL    hydrocortisone Sod Succ (PF) (SOLU-CORTEF) injection 100 mg    sodium chloride (NS) flush 5-10 mL    sodium chloride (NS) flush 5-10 mL    acetaminophen (TYLENOL) tablet 500 mg    HYDROcodone-acetaminophen (NORCO) 5-325 mg per tablet 1 Tab    naloxone (NARCAN) injection 0.4 mg    ondansetron (ZOFRAN) injection 2 mg    bisacodyl (DULCOLAX) tablet 5 mg    nicotine (NICODERM CQ) 7 mg/24 hr patch 1 Patch    heparin (porcine) injection 5,000 Units    cefepime (MAXIPIME) 2 g in 0.9% sodium chloride (MBP/ADV) 100 mL    0.9% sodium chloride infusion    vancomycin (VANCOCIN) 1250 mg in  ml infusion    mupirocin (BACTROBAN) 2 % ointment    NOREPINephrine (LEVOPHED) 8 mg in 5% dextrose 250mL infusion        Current Facility-Administered Medications:     sodium chloride (NS) flush 5-10 mL, 5-10 mL, IntraVENous, Q8H, Gabe De Dios DO, 10 mL at 07/02/18 0503    hydrocortisone Sod Succ (PF) (SOLU-CORTEF) injection 100 mg, 100 mg, IntraVENous, Q8H, Man Beard MD, 100 mg at 07/02/18 0503    sodium chloride (NS) flush 5-10 mL, 5-10 mL, IntraVENous, Q8H, Man Beard MD, 10 mL at 07/02/18 0503    sodium chloride (NS) flush 5-10 mL, 5-10 mL, IntraVENous, PRN, Man Beard MD    acetaminophen (TYLENOL) tablet 500 mg, 500 mg, Oral, Q4H PRN, Giorgio Jackson MD    HYDROcodone-acetaminophen [facility]) 5-325 mg per tablet 1 Tab, 1 Tab, Oral, Q6H PRN, Giorgio Jackson MD    naloxone [facility]) injection 0.4 mg, 0.4 mg, IntraVENous, PRN, Giorgio Jackson MD    ondansetron [facility]) injection 2 mg, 2 mg, IntraVENous, Q6H PRN, Giorgio Jackson MD    bisacodyl (DULCOLAX) tablet 5 mg, 5 mg, Oral, DAILY PRN, Giorgio Jackson MD    nicotine (NICODERM CQ) 7 mg/24 hr patch 1 Patch, 1 Patch, TransDERmal, DAILY PRN, Giorgio Jackson MD    heparin (porcine) injection 5,000 Units, 5,000 Units, SubCUTAneous, Q8H, Giorgio Jackson MD, 5,000 Units at 18 0902    cefepime (MAXIPIME) 2 g in 0.9% sodium chloride (MBP/ADV) 100 mL, 2 g, IntraVENous, Q12H, Giorgio Jackson MD, Last Rate: 200 mL/hr at 18 0455, 2 g at 18 0455    0.9% sodium chloride infusion, 125 mL/hr, IntraVENous, CONTINUOUS, Giorgio Jackson MD, Last Rate: 125 mL/hr at 18 0311, 125 mL/hr at 18 0311    vancomycin (VANCOCIN) 1250 mg in  ml infusion, 1,250 mg, IntraVENous, Q24H, Glenn Pino DO    mupirocin ([facility]) 2 % ointment, , Both Nostrils, Q12H, Lucia Yancey MD    NOREPINephrine (LEVOPHED) 8 mg in 5% dextrose 250mL infusion, 2-16 mcg/min, IntraVENous, CONTINUOUS, Glenn Pino DO, Stopped at 18 0445      Objective:     Vital Signs: Telemetry:    normal sinus rhythm Intake/Output:   Visit Vitals    /60    Pulse 82    Temp 97.5 °F (36.4 °C)    Resp 21    Ht 5' 9\" (1.753 m)    Wt 82.8 kg (182 lb 8.7 oz)    SpO2 98%    BMI 26.96 kg/m2       Temp (24hrs), Av °F (36.7 °C), Min:97.5 °F (36.4 °C), Max:98.6 °F (37 °C)        O2 Device: Room air       Body mass index is 26.96 kg/(m^2).     Wt Readings from Last 4 Encounters:   18 82.8 kg (182 lb 8.7 oz)   18 82.1 kg (181 lb)   10/21/17 82.1 kg (181 lb)          Intake/Output Summary (Last 24 hours) at 18 0921  Last data filed at 18 0900   Gross per 24 hour   Intake          1317.74 ml   Output             2700 ml   Net          1307.74 ml       Last shift:      07/02 0701 - 07/02 1900  In: 56 [P.O.:240; I.V.:250]  Out: 250 [Urine:250]  Last 3 shifts: 06/30 1901 - 07/02 0700  In: 3517.7 [P.O.:1075; I.V.:2442.7]  Out: 2450 [Urine:2450]         Physical Exam:     General:  male; in no respiratory distress and acyanotic; room air;   HEAD: Normocephalic, without obvious abnormality, atraumatic   EYES: conjunctivae clear. PERRL,  AN Icteric sclerae   NOSE: nares normal, no drainage, no nasal flaring,    THROAT: Lips, mucosa dry; No Thrush; class 3 airway;  tongue midline   Neck: Supple, symmetrical, trachea midline,  No accessory mm use; No Stridor/ cuff leak, No goiter or thyroid tenderness   LYMPH: No abnormally enlarged lymph nodes. in neck or groin   Chest: normal   Lungs: decreased air exchange bibasilar   Heart: Regular rate and rhythm; Abdomen: soft, nontender   : Hayes; clear urine; NO gross hematuria   Extremity: negative, clubbing, multiple joint deformities from tophaceous gout; joint swelling and erythema   Neuro: alert; speech fluent withdraws to pain; unable to check gait and station; does follow simple commands   Psych: oriented to time, place and person ; mild agitation;    Skin: Warm;    Pulses:Bilateral, Radial, 2+   Capillary refill: normal; well perfused,      Data:         All lab results for the last 24 hours reviewed.           Labs:    Recent Labs      07/02/18 0452 07/01/18 1822 07/01/18   1123   WBC  9.6   --   14.4*   HGB  10.4*   --   12.9   PLT  162   --   223   INR   --   1.1  1.0     Recent Labs      07/02/18 0452 07/01/18 1822 07/01/18   1123   NA  143   --   140   K  5.5*   --   5.9*   CL  116*   --   110*   CO2  19*   --   20*   GLU  113*   --   83   BUN  40*   --   60*   CREA  1.57*   --   2.21*   CA  8.0*   --   8.9   MG   --   1.7  2.0   LAC   --   0.9   --    ALB   --    --   3.1*   SGOT   --    --   23   ALT   --    --   46     Recent Labs      07/01/18   1650   PH  7.38   PCO2  29*   PO2  87   HCO3  17*     Recent Labs      07/01/18   1123   CPK  30*   CKNDX  3.7*   TROIQ  <0.05     No results found for: BNPP, BNP   Lab Results   Component Value Date/Time    Culture result: NO GROWTH AFTER 19 HOURS 07/01/2018 11:23 AM    Culture result: NO GROWTH 14 DAYS 10/21/2017 07:45 PM    Culture result: Culture performed on Unspun Fluid 10/21/2017 07:45 PM     Lab Results   Component Value Date/Time    CK 30 (L) 07/01/2018 11:23 AM         Imaging:  I have personally reviewed the patients radiographs and have reviewed the reports:          Results from Hospital Encounter encounter on 07/01/18   XR CHEST PORT   Narrative EXAM:  XR CHEST PORT    INDICATION:  cvl PLACEMENT    COMPARISON:  7/1/2018    FINDINGS: A portable AP radiograph of the chest was obtained at 1619 hours. Right IJ catheter tip overlies SVC. There is no pneumothorax. Visualized lungs  are clear. . . Heart size is stable. .          Impression IMPRESSION: Right IJ catheter tip overlies SVC. There is no pneumothorax. No results found for this or any previous visit. This care involved high complexity decision making which includes independently reviewing the patient's past medical records, current laboratory results, medication profiles that were immediately available to me and actual Xray images at the bedside in order to assess, support vital system function, and to treat this degree of vital organ system failure, and to prevent further deterioration of the patients condition.      Medical Decision Making Today:  · Reviewed the flowsheet and previous days notes  · Reviewed and summarized records or history from previous days note or discussions with staff, family  · Parenteral controlled substances - Reviewed/ Adjusted / Peggi Pear / Started  · High Risk Drug therapy requiring intensive monitoring for toxicity: eg steroids, pressors, antibiotics  · MAR reviewed and pertinent medications noted or modified as needed  · Reviewed and/or ordered Clinical lab tests  · Reviewed and/or ordered Radiology tests  · Reviewed and/or ordered of Medicine tests  · Independently visualized radiologic Images  · I have personally reviewed the patients ECG / Telemetry  ·          Thank you for allowing us to participate in the care of this patient. We will be happy to follow along with you.     Luis A Cee MD

## 2018-07-02 NOTE — PROGRESS NOTES
1730: Patient arrived to floor. No complaints of pain, Vitals stable. Primary Nurse Rachel Rodriguez and Aubrie Dupree RN performed a dual skin assessment on this patient No impairment noted  Mike score is 19    1900: Bedside report given to oncoming nurse.

## 2018-07-02 NOTE — PROGRESS NOTES
2100: Patient with moderate amount of watery stool. Notified MD on call and sent for C Diff. (Per patient, having this for a couple weeks now). Placed on enteric contact precautions until resulted. Education provided to patient and family. Titrating levophed drip to maintain MAP>65. Rapid flu, MRSA nares and UA sent. 2315: Post void scan: 178 ml.  0445: Weaned off levophed drip.  5928: Patient with 2 more loose stools so far this shift. Skin/velasquez care provided each time. Sacral area WNL.

## 2018-07-02 NOTE — PROGRESS NOTES
TRANSFER - IN REPORT:    Verbal report received from LORENZO Dumont(name) on Arin Marrero Sr.  being received from CCU(unit) for routine progression of care      Report consisted of patients Situation, Background, Assessment and   Recommendations(SBAR). Information from the following report(s) SBAR, Kardex, Intake/Output, MAR, Recent Results and Cardiac Rhythm Aflutter was reviewed with the receiving nurse. Opportunity for questions and clarification was provided. Assessment completed upon patients arrival to unit and care assumed.

## 2018-07-02 NOTE — PROGRESS NOTES
0700: Change of shift report received from Edwin Pink RN. Patient on 2L NC. Drips verified: NS at 125cc/hr, levo off at this time. Patient heart rhythm: a-flutter. 0800: Initial assessment completed; patient AOx4, pleasant. Hard of hearing. Lung sounds clear, but diminished. Abdomen semi-soft, intact. Mepilex noted on sacrum. Gout noted on fingers and toes, red,w arm to touch. Per patient, \"it looks much better. \" Patient moves all extremities with no noted weakness. 3231: MD Marva Savage in with patient, updated on patient's admission history and overnight events; MD notified that patient's levophed has been off since midnight, BP stable. MD also made aware of V/Q scan scheduled for today, MD states order can be cancelled. Nurse calls Danna Daugherty RN with nuclear medicine to inform of cancelled scan. Cannot d/c order at this time as it is locked by scheduling, but will attempt later. 0900: Nurse assists patient to in-room bathroom, standby assist. Patient has a loose, watery stool. 1024: IDR completed with MD Marva Savage, orders received to start patient on folic acid, thiamine B-1, and therapeutic multivitamin. Wound care also consulted to assess gout. MD Marva Savage would like fluids to continue for now (until order runs out at 1700). 1200: Reassessment performed; no changes noted at this time    1312: C.diff sample results negative, contact precautions d/c'd    1530: Echo tech at bedside    1559: MD Lana Barclay in with patient, orders received that central line may be removed     1625: Report called to Lissa Torres RN on CIU. Patient to go to room 2210.     1647: Central line removed per MD Aguayo's order. Pressure held to site after removal for 5 minutes, dressing applied. No bleeding or hematoma noted. 1659: MD Genie Anguiano in with patient, echo results discussed. No new orders received at this time.      1715: TRANSFER - OUT REPORT:    Verbal report given to Elaine Smith RN (name) on [facility].  being transferred to CIU (unit) for routine progression of care       Report consisted of patients Situation, Background, Assessment and   Recommendations(SBAR). Information from the following report(s) SBAR, Kardex, ED Summary, Intake/Output, MAR and Recent Results was reviewed with the receiving nurse. Lines:   Triple Lumen Central line 07/01/18 Right Neck (Active)   Central Line Being Utilized Yes 7/2/2018  4:00 PM   Criteria for Appropriate Use Hemodynamically unstable, requiring monitoring lines, vasopressors, or volume resuscitation 7/2/2018  4:00 PM   Site Assessment Clean, dry, & intact 7/2/2018  4:00 PM   Infiltration Assessment 0 7/2/2018  4:00 PM   Affected Extremity/Extremities Color distal to insertion site pink (or appropriate for race) 7/2/2018  4:00 PM   Date of Last Dressing Change 07/01/18 7/2/2018  4:00 PM   Dressing Status Clean, dry, & intact 7/2/2018  4:00 PM   Dressing Type Disk with Chlorhexadine gluconate (CHG); Transparent 7/2/2018  4:00 PM   Action Taken Open ports on tubing capped 7/2/2018  4:00 PM   Proximal Hub Color/Line Status White;Flushed;Capped 7/2/2018  4:00 PM   Positive Blood Return (Medial Site) Yes 7/2/2018  4:00 PM   Medial Hub Color/Line Status Blue; Other (comment) 7/2/2018  4:00 PM   Positive Blood Return (Lateral Site) No 7/2/2018  4:00 PM   Distal Hub Color/Line Status Brown;Flushed;Capped 7/2/2018  4:00 PM   Positive Blood Return (Site #3) Yes 7/2/2018  4:00 PM   Alcohol Cap Used Yes 7/2/2018  4:00 PM       Peripheral IV 07/01/18 Left Antecubital (Active)   Site Assessment Clean, dry, & intact 7/2/2018  4:00 PM   Phlebitis Assessment 0 7/2/2018  4:00 PM   Infiltration Assessment 0 7/2/2018  4:00 PM   Dressing Status Clean, dry, & intact 7/2/2018  4:00 PM   Dressing Type Transparent;Tape 7/2/2018  4:00 PM   Hub Color/Line Status Pink;Flushed;Capped 7/2/2018  4:00 PM   Action Taken Open ports on tubing capped 7/2/2018  4:00 PM   Alcohol Cap Used No 7/2/2018  4:00 PM        Opportunity for questions and clarification was provided.       Patient transported with:   Monitor  Registered Nurse

## 2018-07-02 NOTE — PROGRESS NOTES
Problem: Falls - Risk of  Goal: *Absence of Falls  Document Jessica Fall Risk and appropriate interventions in the flowsheet.    Outcome: Progressing Towards Goal  Fall Risk Interventions:            Medication Interventions: Bed/chair exit alarm, Evaluate medications/consider consulting pharmacy    Elimination Interventions: Bed/chair exit alarm, Patient to call for help with toileting needs, Toileting schedule/hourly rounds, Urinal in reach

## 2018-07-02 NOTE — H&P
History and Physical          Pt Name  Bharti Ramachandran Sr. Date of Birth 1950   Medical Record Number  [id number]      Age  76 y.o. PCP Zoey Granado MD   Admit date:  7/1/2018    Room Number  [phone] [id number]  @ [facility]   Date of Service  7/1/2018     Admission Diagnoses:  Severe sepsis [facility])     Certification: We are admitting Bharti Ramachandran Sr. 76 y.o. male with a principle diagnosis of Severe sepsis [facility])  This patient also suffers from other comorbidities listed below. I have a high level of concern for imminent death      Assessment and plan:  Severe sepsis with septic shock etiology not very clear (see below). Presenting with   ARF   Atrial flutter   -Admit to ICU   -IVF generous (sepsis orders initiated by ER )  -Pressors (neosyn ordered )   -Solu-cortef - limited dose - due to recent hx of steroid use (unclear about details )   -Broad spectrum abx with cefepime + Vanc per pharmacy protocol   -Intensivist consultation   -Nephrologist consultation   -Pan culture per sepsis protocol     Atrial flutter / Tachycardia   -Cardiologist consultation   -V/Q Scan   -Serial troponin levels   -Echo complete tomorrow     Possible COPD - due to 1/5 ppd smoking for over 40 years and clinical examination highly suggestive of COPD without acute exacerbation   -Oxygen supplementation . Hx of Tophaceous gout - pt had punctured several of the tophi in his hand recently No sign of cellulitis there. Hx of squamous cell CA on various parts of his body s/p blue light Rx. Heavy Tobacco abuse history     Heavy ETOH abuse history - One pint of whiskey every two days !!   -Watch for DT - he is somewhat out of the standard DT period - he states that his last drink was over two weeks. 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     Surrogate decision maker: Pt's wife    Prophylaxis  Hep SQ   Discharge Plan: Pending ,    There are currently no Active Isolations Payor: [facility] / Plan: [facility] PPO / Product Type: PPO /    Social issues  Date Comment    07/01/18  I met with the patient's wife, brother, sister in law and his wife's parents in the room. We have had extensive discussions about clinical history, recent events and today's clinical findings. I explained to his wife about plans of care and expectation of the course. I answered all questions. Prognosis  Guarded      Subjective Data         History of Present Illness : The pt was 'fine ' about two weeks ago. He developed a cough with some feverish episodes for which his PCP prescribed steroids PO, PO doxycycline. The pt states that after taking those medicines for three to four days he developed tremulousness and felt worse. Therefore, he stopped taking those medicines. He continued to feel unwell. During this time he continues with his work and at work about a week ago he felt lightheaded and noticed blind spots and he was unable to move due to acute weakness. He took time off ( I believe) and spent resting at home last few days. He didn't listen to his wife's request and didn't come to ER when he felt unwell last Monday. This morning he was brought to ER and since then workup has revealed above findings.       Review of Systems - History obtained from spouse and the patient  General ROS: positive for  - chills, fatigue and fever  negative for - weight loss  Psychological ROS: negative  Ophthalmic ROS: positive for - scotomata  ENT ROS: negative for - epistaxis, headaches or hearing change  Respiratory ROS: positive for - cough and shortness of breath  negative for - hemoptysis  Cardiovascular ROS: no chest pain or dyspnea on exertion  Gastrointestinal ROS: positive for - diarrhea  negative for - blood in stools  Genito-Urinary ROS: no dysuria, trouble voiding, or hematuria  Musculoskeletal ROS: negative  Neurological ROS: negative for - confusion, gait disturbance, headaches or impaired coordination/balance  Dermatological ROS: negative  ROS       Past Medical History:   Diagnosis Date    Ill-defined condition     gout       History reviewed. No pertinent surgical history. Social History   Substance Use Topics    Smoking status: Current Every Day Smoker     Packs/day: 1.50    Smokeless tobacco: Former User    Alcohol use 6.0 oz/week     10 Shots of liquor per week       History reviewed. No pertinent family history.         Objective data     Comment:  Pleasant diaphoretic gentleman lying in ER Bed   His family in the ER room      Patient Vitals for the past 24 hrs:   Temp   07/01/18 1645 98.2 °F (36.8 °C)   07/01/18 1215 98.6 °F (37 °C)   07/01/18 1027 97.6 °F (36.4 °C)    Patient Vitals for the past 24 hrs:   Pulse   07/01/18 2100 98   07/01/18 2000 (!) 125   07/01/18 1930 84   07/01/18 1915 97   07/01/18 1900 (!) 109   07/01/18 1845 91   07/01/18 1830 81   07/01/18 1815 84   07/01/18 1800 81   07/01/18 1745 81   07/01/18 1730 82   07/01/18 1715 81   07/01/18 1700 81   07/01/18 1645 82   07/01/18 1515 87   07/01/18 1500 81   07/01/18 1445 82   07/01/18 1415 82   07/01/18 1400 97   07/01/18 1345 79   07/01/18 1330 95   07/01/18 1300 82   07/01/18 1215 87   07/01/18 1145 93   07/01/18 1130 93   07/01/18 1100 88   07/01/18 1027 81    Patient Vitals for the past 24 hrs:   Resp   07/01/18 2100 29   07/01/18 2000 25   07/01/18 1900 20   07/01/18 1815 22   07/01/18 1800 19   07/01/18 1745 23   07/01/18 1700 15   07/01/18 1645 19   07/01/18 1515 21   07/01/18 1500 19   07/01/18 1445 24   07/01/18 1415 26   07/01/18 1400 22   07/01/18 1345 26   07/01/18 1330 22   07/01/18 1300 23   07/01/18 1215 16   07/01/18 1145 22   07/01/18 1130 24   07/01/18 1100 17   07/01/18 1027 16    Patient Vitals for the past 24 hrs:   BP   07/01/18 2100 109/61   07/01/18 2045 (!) 86/54   07/01/18 2030 111/65   07/01/18 2015 97/49   07/01/18 2000 99/55   07/01/18 1945 112/53   07/01/18 1930 112/54   07/01/18 1915 104/58   07/01/18 1900 98/67   07/01/18 1845 108/60   07/01/18 1830 96/56   07/01/18 1815 103/64   07/01/18 1800 97/64   07/01/18 1745 103/68   07/01/18 1730 100/55   07/01/18 1715 96/59   07/01/18 1700 93/50   07/01/18 1645 (!) 84/57   07/01/18 1515 92/71   07/01/18 1500 (!) 88/66   07/01/18 1445 (!) 82/65   07/01/18 1415 (!) 87/69   07/01/18 1400 98/65   07/01/18 1345 (!) 72/52   07/01/18 1330 90/53   07/01/18 1300 91/55   07/01/18 1215 (!) 83/56   07/01/18 1145 (!) 80/58   07/01/18 1130 (!) 75/48   07/01/18 1100 (!) 84/64   07/01/18 1027 (!) 74/43        SpO2 Readings from Last 6 Encounters:   07/01/18 96%   02/25/18 97%   10/21/17 96%         O2 Device: Room air   Body mass index is 26.96 kg/(m^2). - Wt Readings from Last 10 Encounters:   07/01/18 82.8 kg (182 lb 8.7 oz)   02/25/18 82.1 kg (181 lb)   10/21/17 82.1 kg (181 lb)          Physical Exam:             General:  Alert, cooperative,   well noursished,   well developed,   appears stated age    Ears/Eyes:  Hearing intact  Sclera anicteric.    Pupils equal   Mouth/Throat:  Mucous membranes normal pink and dry   Oral pharynx clear    Neck:     Lungs:  Trachea midline  Chest excursion symmetrical   Auscultation B/L Symmetrical with   Vesicular breath sounds  With mildly prolonged breaths    No Crepitations noted   Percussion note resonant on mid Clavicular line; no sign of pneumothorax        CVS:  Regular rate and rhythm   Distant barely audible sounds   no  murmur,   No click, rub or gallop  S1 normal   S2 normal   Pedal pulses  b/l symmetrical   Abdomen:  Obese  Soft, non-tender  Bowel sounds normal  No distension   Percussion note tympanitic   Extremities:  Tophaceous gout noted on both UEs   No cyanosis, jaundice  No edema noted   No sign of DVT/cord like lesion on palpation  No sign of acute trauma   Age appropriate OA changes noted. Skin:    Skin color, texture, turgor normal. no acute rash or lesions    Lymph nodes:     Musculoskeletal Muscle bulk B/L symmetrical   Neuro Cranial nerves are intact,   motor movement b/l symmetrical,   Sensory evaluation b/l symmetrical    Psych:  Alert and oriented, normal mood & affect given the clinical scenario          Medications reviewed     Current Facility-Administered Medications   Medication Dose Route Frequency    sodium chloride (NS) flush 5-10 mL  5-10 mL IntraVENous Q8H    hydrocortisone Sod Succ (PF) (SOLU-CORTEF) injection 100 mg  100 mg IntraVENous Q8H    sodium chloride (NS) flush 5-10 mL  5-10 mL IntraVENous Q8H    sodium chloride (NS) flush 5-10 mL  5-10 mL IntraVENous PRN    acetaminophen (TYLENOL) tablet 500 mg  500 mg Oral Q4H PRN    HYDROcodone-acetaminophen (NORCO) 5-325 mg per tablet 1 Tab  1 Tab Oral Q6H PRN    naloxone (NARCAN) injection 0.4 mg  0.4 mg IntraVENous PRN    ondansetron (ZOFRAN) injection 2 mg  2 mg IntraVENous Q6H PRN    bisacodyl (DULCOLAX) tablet 5 mg  5 mg Oral DAILY PRN    nicotine (NICODERM CQ) 7 mg/24 hr patch 1 Patch  1 Patch TransDERmal DAILY PRN    heparin (porcine) injection 5,000 Units  5,000 Units SubCUTAneous Q8H    cefepime (MAXIPIME) 2 g in 0.9% sodium chloride (MBP/ADV) 100 mL  2 g IntraVENous Q12H    0.9% sodium chloride infusion  125 mL/hr IntraVENous CONTINUOUS    [START ON 7/2/2018] vancomycin (VANCOCIN) 1250 mg in  ml infusion  1,250 mg IntraVENous Q24H    mupirocin (BACTROBAN) 2 % ointment   Both Nostrils Q12H    NOREPINephrine (LEVOPHED) 8 mg in 5% dextrose 250mL infusion  2-16 mcg/min IntraVENous CONTINUOUS    influenza vaccine 2017-18 (3 yrs+)(PF) (FLUZONE QUAD/FLUARIX QUAD) injection 0.5 mL  0.5 mL IntraMUSCular PRIOR TO DISCHARGE       Relevant other informations:      Other medical conditions listed in this following active hospital problem list section; all of these and other pertinent data were taken into consideration when treatment plan is developed and customized to this patient's unique overall circumstances and needs. We have reviewed available old medical records within the constraints of this admission process. Present on Admission:   Severe sepsis ([address]       Data Review:   Recent Days:  All Micro Results     Procedure Component Value Units Date/Time    MRSA CULTURE [[id number]] Collected:  07/01/18 2051    Order Status:  Completed Specimen:  Nares Updated:  07/01/18 2118    INFLUENZA A & B AG (RAPID TEST) [[id number]] Collected:  07/01/18 2051    Order Status:  Completed Specimen:  Nasopharyngeal from Nasal washing Updated:  07/01/18 2118    C. DIFFICILE (DNA) [[id number]] Collected:  07/01/18 2100    Order Status:  Completed Updated:  07/01/18 2118    CULTURE, BLOOD [[id number]]     Order Status:  Sent Specimen:  Blood from Blood     CULTURE, BLOOD [[id number]]     Order Status:  Sent Specimen:  Blood from Blood     CULTURE, BLOOD, PAIRED [[id number]] Collected:  07/01/18 1123    Order Status:  Completed Specimen:  Blood Updated:  07/01/18 1137          Recent Labs      07/01/18   1123   WBC  14.4*   HGB  12.9   HCT  36.9   PLT  223     Recent Labs      07/01/18   1822  07/01/18   1123   NA   --   140   K   --   5.9*   CL   --   110*   CO2   --   20*   GLU   --   83   BUN   --   60*   CREA   --   2.21*   CA   --   8.9   MG  1.7  2.0   ALB   --   3.1*   TBILI   --   0.7   SGOT   --   23   ALT   --   46   INR  1.1  1.0      Lab Results   Component Value Date/Time    TSH 3.84 (H) 07/01/2018 11:23 AM         Care Plan discussed with: Patient/Family, Nurse and Other ER doctor     Other medical conditions are listed in the active hospital problem list section; these and other pertinent data were taken into consideration when the treatment plan was developed and customized to this patient's unique overall circumstances and needs.     High complexity decision making was performed for this patient who is at high risk for decompensation with multiple organ involvement. Today total floor/unit time was 70 minutes of critical care  minutes while caring for this patient and greater than 50% of that time was spent with patient (and/or family) coordinating patients clinical issues.      Arnold Hill MD MPH  FACP                               7/1/2018       __________________________________________________________   FOR SOUND PHYSICIAN ADMINISTRATIVE USE ONLY   MDM       INPT 291      Grayson Mccormack MD MPH FACP   9:31 PM  7/1/2018

## 2018-07-02 NOTE — PROGRESS NOTES
Hospitalist Progress Note    NAME: Ness Guerra Sr.   :  1950   MRN:  [id number]       Assessment / Plan:  Severe sepsis with shock ( unclear etiology)  Acute uncontrolled tophaceous gout     Blood cultures on   Chest xray neg  Started on empiric vanco and cefepime d2  Off the pressors, on solucortef, would taper the steroids  On colchicine  Decrease the ivf  Wound care consult    Per the patient, he has left elbow boggy swelling since couple of years, get infected  Needing antibiotics couple of times    Hx of squamous cell CA on various parts of his body s/p blue light Rx. Heavy Tobacco abuse history   Heavy ETOH abuse history - One pint of whiskey every two days- at high risk for withdrawal    Aflutter  Echo pending cardiology eval    haroon  On ? ckd cr 2.2  Monitor on ivf, if not improving further work up     Body mass index is 27.31 kg/(m^2). over weight    Code status: full  Prophylaxis: heparin  Recommended Disposition: tbd     Subjective:     Chief Complaint / Reason for Physician Visit  My left elbow gets infection all the time    Discussed with RN events overnight. Review of Systems:  Symptom Y/N Comments  Symptom Y/N Comments   Fever/Chills n   Chest Pain n    Poor Appetite    Edema     Cough n   Abdominal Pain n    Sputum    Joint Pain     SOB/DANIELS    Pruritis/Rash     Nausea/vomit    Tolerating PT/OT     Diarrhea    Tolerating Diet     Constipation    Other       Could NOT obtain due to:      Objective:     VITALS:   Last 24hrs VS reviewed since prior progress note.  Most recent are:  Patient Vitals for the past 24 hrs:   Temp Pulse Resp BP SpO2   18 1500 - 86 20 105/62 96 %   18 1400 - 88 17 100/71 98 %   18 1300 - 88 15 120/63 97 %   18 1200 98.3 °F (36.8 °C) 86 15 107/57 98 %   18 1100 - 85 14 (!) 119/94 97 %   18 1000 - (!) 120 20 114/59 99 %   18 0900 - 82 21 115/60 98 %   18 0800 97.5 °F (36.4 °C) 82 17 113/69 99 %   18 0700 - 81 17 115/64 98 %   07/02/18 0630 - 80 14 99/60 98 %   07/02/18 0600 - 80 22 - 99 %   07/02/18 0530 - 77 20 114/62 98 %   07/02/18 0500 - 82 23 113/69 97 %   07/02/18 0400 97.5 °F (36.4 °C) 79 15 98/55 95 %   07/02/18 0300 - 76 14 103/66 96 %   07/02/18 0200 - 76 16 120/75 97 %   07/02/18 0100 - 75 15 113/63 95 %   07/02/18 0000 98.5 °F (36.9 °C) 84 16 121/86 97 %   07/01/18 2200 - 82 21 101/52 97 %   07/01/18 2130 - (!) 107 24 111/57 97 %   07/01/18 2100 98.4 °F (36.9 °C) 98 29 109/61 96 %   07/01/18 2045 - - - (!) 86/54 -   07/01/18 2030 - - - 111/65 -   07/01/18 2015 - - - 97/49 -   07/01/18 2000 - (!) 125 25 99/55 95 %   07/01/18 1945 - - - 112/53 -   07/01/18 1930 - 84 - 112/54 97 %   07/01/18 1915 - 97 - 104/58 96 %   07/01/18 1900 - (!) 109 20 98/67 96 %   07/01/18 1845 - 91 - 108/60 97 %   07/01/18 1830 - 81 - 96/56 97 %   07/01/18 1815 - 84 22 103/64 96 %   07/01/18 1800 - 81 19 97/64 98 %   07/01/18 1745 - 81 23 103/68 99 %   07/01/18 1730 - 82 - 100/55 99 %   07/01/18 1715 - 81 - 96/59 99 %   07/01/18 1700 - 81 15 93/50 98 %   07/01/18 1645 98.2 °F (36.8 °C) 82 19 (!) 84/57 99 %       Intake/Output Summary (Last 24 hours) at 07/02/18 6723  Last data filed at 07/02/18 1500   Gross per 24 hour   Intake          5372.74 ml   Output             2950 ml   Net          2422.74 ml        PHYSICAL EXAM:  General:  Alert, cooperative, no acute distress    EENT:  EOMI. Anicteric sclerae. MMM  Resp:  B/l decreased air entry  No accessory muscle use  CV:  Regular  rhythm,  No edema  GI:  Soft, Non distended, Non tender.  +Bowel sounds  Neurologic:  Alert and oriented X 3, normal speech,   Psych:   Good insight. Not anxious nor agitated  Skin:  No rashes.   No jaundice, right 2 nd toe tophi, left elbow with boggy swelling, not tender, no redness    Reviewed most current lab test results and cultures  YES  Reviewed most current radiology test results   YES  Review and summation of old records today    NO  Reviewed patient's current orders and MAR    YES  PMH/SH reviewed - no change compared to H&P  ________________________________________________________________________  Care Plan discussed with:    Comments   Patient y    Family      RN y    Care Manager     Consultant                        Multidiciplinary team rounds were held today with , nursing, pharmacist and clinical coordinator. Patient's plan of care was discussed; medications were reviewed and discharge planning was addressed. ________________________________________________________________________  Total NON critical care TIME: 35  Minutes    Total CRITICAL CARE TIME Spent:   Minutes non procedure based      Comments   >50% of visit spent in counseling and coordination of care     ________________________________________________________________________  Sage Martínez MD     Procedures: see electronic medical records for all procedures/Xrays and details which were not copied into this note but were reviewed prior to creation of Plan. LABS:  I reviewed today's most current labs and imaging studies.   Pertinent labs include:  Recent Labs      07/02/18   0452  07/01/18   1123   WBC  9.6  14.4*   HGB  10.4*  12.9   HCT  29.3*  36.9   PLT  162  223     Recent Labs      07/02/18   0452  07/01/18   1822  07/01/18   1123   NA  143   --   140   K  5.5*   --   5.9*   CL  116*   --   110*   CO2  19*   --   20*   GLU  113*   --   83   BUN  40*   --   60*   CREA  1.57*   --   2.21*   CA  8.0*   --   8.9   MG   --   1.7  2.0   ALB   --    --   3.1*   TBILI   --    --   0.7   SGOT   --    --   23   ALT   --    --   46   INR   --   1.1  1.0       Signed: Sage Martínez MD

## 2018-07-02 NOTE — PROGRESS NOTES
Pharmacy Automatic Renal Dosing Protocol - Antimicrobials    Indication for Antimicrobials: Unknown     Current Regimen of Each Antimicrobial:  Vancomycin 2000 mg IV load followed by 1250 mg IV every 24 hours (Start Date ; Day # 2)  Cefepime 2 g IV every 12 hours (Start Date ; Day # 2)    Previous Antimicrobial Therapy:  Levofloxacin     Goal Level: VANCOMYCIN TROUGH GOAL RANGE  Vancomycin Trough: 15 - 20 mcg/mL    Date Dose & Interval Measured (mcg/mL) Extrapolated (mcg/mL)                       Significant Cultures:    Blood: pending    Paralysis, amputations, malnutrition: None documented    Labs:  Recent Labs      18   0452  18   1123   CREA  1.57*  2.21*   BUN  40*  60*   WBC  9.6  14.4*     Temp (24hrs), Av °F (36.7 °C), Min:97.5 °F (36.4 °C), Max:98.5 °F (36.9 °C)    Creatinine Clearance (mL/min) or Dialysis: 45 mL/min    Impression/Plan:   · Scr improved, adjusted vancomycin to 1250 mg every 18 hours. Adjusted in time for first dose to be about 18 hours from loading dose. Should not affect timing of trough  · continue cefepime 2 g IV every 12 hours for CrCl 30-60 mL/min per renal dosing protocol. · Antimicrobial stop date TBD     Pharmacy will follow daily and adjust medications as appropriate for renal function and/or serum levels.     Thank you,  Amy Fenton, PHARMD

## 2018-07-03 ENCOUNTER — APPOINTMENT (OUTPATIENT)
Dept: ULTRASOUND IMAGING | Age: 68
DRG: 853 | End: 2018-07-03
Attending: HOSPITALIST
Payer: COMMERCIAL

## 2018-07-03 LAB
ANION GAP SERPL CALC-SCNC: 8 MMOL/L (ref 5–15)
BACTERIA SPEC CULT: NORMAL
BACTERIA SPEC CULT: NORMAL
BASOPHILS # BLD: 0 K/UL (ref 0–0.1)
BASOPHILS NFR BLD: 0 % (ref 0–1)
BUN SERPL-MCNC: 33 MG/DL (ref 6–20)
BUN/CREAT SERPL: 24 (ref 12–20)
CALCIUM SERPL-MCNC: 8.3 MG/DL (ref 8.5–10.1)
CHLORIDE SERPL-SCNC: 113 MMOL/L (ref 97–108)
CO2 SERPL-SCNC: 19 MMOL/L (ref 21–32)
CREAT SERPL-MCNC: 1.35 MG/DL (ref 0.7–1.3)
DIFFERENTIAL METHOD BLD: ABNORMAL
EOSINOPHIL # BLD: 0 K/UL (ref 0–0.4)
EOSINOPHIL NFR BLD: 0 % (ref 0–7)
ERYTHROCYTE [DISTWIDTH] IN BLOOD BY AUTOMATED COUNT: 13.8 % (ref 11.5–14.5)
GLUCOSE SERPL-MCNC: 152 MG/DL (ref 65–100)
HCT VFR BLD AUTO: 29.2 % (ref 36.6–50.3)
HGB BLD-MCNC: 10.3 G/DL (ref 12.1–17)
IMM GRANULOCYTES # BLD: 0 K/UL (ref 0–0.04)
IMM GRANULOCYTES NFR BLD AUTO: 0 % (ref 0–0.5)
LYMPHOCYTES # BLD: 1.2 K/UL (ref 0.8–3.5)
LYMPHOCYTES NFR BLD: 13 % (ref 12–49)
MCH RBC QN AUTO: 31 PG (ref 26–34)
MCHC RBC AUTO-ENTMCNC: 35.3 G/DL (ref 30–36.5)
MCV RBC AUTO: 88 FL (ref 80–99)
MONOCYTES # BLD: 0.1 K/UL (ref 0–1)
MONOCYTES NFR BLD: 1 % (ref 5–13)
NEUTS BAND NFR BLD MANUAL: 1 %
NEUTS SEG # BLD: 7.8 K/UL (ref 1.8–8)
NEUTS SEG NFR BLD: 85 % (ref 32–75)
NRBC # BLD: 0 K/UL (ref 0–0.01)
NRBC BLD-RTO: 0 PER 100 WBC
PLATELET # BLD AUTO: 150 K/UL (ref 150–400)
PMV BLD AUTO: 9.9 FL (ref 8.9–12.9)
POTASSIUM SERPL-SCNC: 4.9 MMOL/L (ref 3.5–5.1)
RBC # BLD AUTO: 3.32 M/UL (ref 4.1–5.7)
RBC MORPH BLD: ABNORMAL
SERVICE CMNT-IMP: NORMAL
SODIUM SERPL-SCNC: 140 MMOL/L (ref 136–145)
WBC # BLD AUTO: 9.1 K/UL (ref 4.1–11.1)

## 2018-07-03 PROCEDURE — 76770 US EXAM ABDO BACK WALL COMP: CPT

## 2018-07-03 PROCEDURE — 65660000000 HC RM CCU STEPDOWN

## 2018-07-03 PROCEDURE — G8978 MOBILITY CURRENT STATUS: HCPCS

## 2018-07-03 PROCEDURE — 74011250636 HC RX REV CODE- 250/636: Performed by: INTERNAL MEDICINE

## 2018-07-03 PROCEDURE — G8979 MOBILITY GOAL STATUS: HCPCS

## 2018-07-03 PROCEDURE — 74011250637 HC RX REV CODE- 250/637: Performed by: INTERNAL MEDICINE

## 2018-07-03 PROCEDURE — 80048 BASIC METABOLIC PNL TOTAL CA: CPT | Performed by: INTERNAL MEDICINE

## 2018-07-03 PROCEDURE — G8980 MOBILITY D/C STATUS: HCPCS

## 2018-07-03 PROCEDURE — 97161 PT EVAL LOW COMPLEX 20 MIN: CPT

## 2018-07-03 PROCEDURE — 85025 COMPLETE CBC W/AUTO DIFF WBC: CPT | Performed by: INTERNAL MEDICINE

## 2018-07-03 PROCEDURE — 74011000258 HC RX REV CODE- 258: Performed by: INTERNAL MEDICINE

## 2018-07-03 PROCEDURE — 97165 OT EVAL LOW COMPLEX 30 MIN: CPT

## 2018-07-03 PROCEDURE — 36415 COLL VENOUS BLD VENIPUNCTURE: CPT | Performed by: INTERNAL MEDICINE

## 2018-07-03 RX ORDER — DILTIAZEM HYDROCHLORIDE 180 MG/1
180 CAPSULE, COATED, EXTENDED RELEASE ORAL DAILY
Status: DISCONTINUED | OUTPATIENT
Start: 2018-07-03 | End: 2018-07-06

## 2018-07-03 RX ORDER — GUAIFENESIN 100 MG/5ML
81 LIQUID (ML) ORAL DAILY
Status: DISCONTINUED | OUTPATIENT
Start: 2018-07-03 | End: 2018-07-04

## 2018-07-03 RX ADMIN — HEPARIN SODIUM 5000 UNITS: 5000 INJECTION, SOLUTION INTRAVENOUS; SUBCUTANEOUS at 01:38

## 2018-07-03 RX ADMIN — HEPARIN SODIUM 5000 UNITS: 5000 INJECTION, SOLUTION INTRAVENOUS; SUBCUTANEOUS at 17:32

## 2018-07-03 RX ADMIN — COLCHICINE 0.6 MG: 0.6 TABLET, FILM COATED ORAL at 08:16

## 2018-07-03 RX ADMIN — HYDROCORTISONE SODIUM SUCCINATE 100 MG: 100 INJECTION, POWDER, FOR SOLUTION INTRAMUSCULAR; INTRAVENOUS at 05:46

## 2018-07-03 RX ADMIN — HEPARIN SODIUM 5000 UNITS: 5000 INJECTION, SOLUTION INTRAVENOUS; SUBCUTANEOUS at 09:25

## 2018-07-03 RX ADMIN — CEFEPIME HYDROCHLORIDE 2 G: 2 INJECTION, POWDER, FOR SOLUTION INTRAVENOUS at 05:47

## 2018-07-03 RX ADMIN — FOLIC ACID 1 MG: 1 TABLET ORAL at 08:16

## 2018-07-03 RX ADMIN — CEFEPIME HYDROCHLORIDE 2 G: 2 INJECTION, POWDER, FOR SOLUTION INTRAVENOUS at 16:06

## 2018-07-03 RX ADMIN — MUPIROCIN: 20 OINTMENT TOPICAL at 08:17

## 2018-07-03 RX ADMIN — VANCOMYCIN HYDROCHLORIDE 1250 MG: 10 INJECTION, POWDER, LYOPHILIZED, FOR SOLUTION INTRAVENOUS at 09:25

## 2018-07-03 RX ADMIN — Medication 10 ML: at 05:51

## 2018-07-03 RX ADMIN — THERA TABS 1 TABLET: TAB at 08:16

## 2018-07-03 RX ADMIN — Medication 100 MG: at 08:16

## 2018-07-03 RX ADMIN — Medication 10 ML: at 16:06

## 2018-07-03 RX ADMIN — DILTIAZEM HYDROCHLORIDE 180 MG: 180 CAPSULE, COATED, EXTENDED RELEASE ORAL at 09:25

## 2018-07-03 RX ADMIN — ASPIRIN 81 MG 81 MG: 81 TABLET ORAL at 09:25

## 2018-07-03 NOTE — PROGRESS NOTES
Initial Nutrition Assessment:    INTERVENTIONS/RECOMMENDATIONS:   · Continue current diet  · Will provide low purine diet ed    ASSESSMENT:   Chart reviewed, medically noted for Acute uncontrolled tophaceous gout. Nutrition referral triggered due to MST score. Pt reports rapid weight gain ~2 weeks PTA followed by loss of that weight. Unsure if this was due to inaccurate scales? Pt reports his usual body weight is 184 lbs, exactly what he currently weighs. He has a good appetite and consuming 100% of his meals. Will proved diet education for low purine diet. Past Medical History:   Diagnosis Date    Ill-defined condition     gout       Diet Order: Cardiac  % Eaten:  Patient Vitals for the past 72 hrs:   % Diet Eaten   07/03/18 0829 100 %   07/02/18 1840 100 %   07/02/18 1400 100 %   07/02/18 0900 100 %     Pertinent Medications: [x]Reviewed: folic acid, MVI, thiamine  Pertinent Labs: [x]Reviewed:   Food Allergies: [x]NKFA  []Other   Last BM: 7/2  Edema:        []RUE   []LUE   []RLE   []LLE      Pressure Injury:      [] Stage I   [] Stage II   [] Stage III   [] Stage IV      Wt Readings from Last 30 Encounters:   07/02/18 83.9 kg (184 lb 15.5 oz)   02/25/18 82.1 kg (181 lb)   10/21/17 82.1 kg (181 lb)       Anthropometrics:   Height: 5' 9\" (175.3 cm) Weight: 83.9 kg (184 lb 15.5 oz)   IBW (%IBW):   ( ) UBW (%UBW):   (  %)   Last Weight Metrics:  Weight Loss Metrics 7/2/2018 2/25/2018 10/21/2017   Today's Wt 184 lb 15.5 oz 181 lb 181 lb   BMI 27.31 kg/m2 25.97 kg/m2 25.97 kg/m2       BMI: Body mass index is 27.31 kg/(m^2). This BMI is indicative of:   []Underweight    []Normal    [x]Overweight    [] Obesity   [] Extreme Obesity (BMI>40)     Estimated Nutrition Needs (Based on):   1900 Kcals/day (BMR: 1575 x 1.2) , 65 g (0.8 g/kg) Protein  Carbohydrate:  At Least 130 g/day  Fluids: 1900 mL/day (1ml/kcal) or per primary team    NUTRITION DIAGNOSES:   Problem:  No nutritional diagnosis at this time      Etiology: related to       Signs/Symptoms: as evidenced by        NUTRITION INTERVENTIONS:  Meals/Snacks: General/healthful diet                  GOAL:   consume >50% of meals in 5-7 days    LEARNING NEEDS (Diet, Food/Nutrient-Drug Interaction):    [x] None Identified   [] Identified and Education Provided/Documented   [] Identified and Pt declined/was not appropriate     Cultureal, [place], [place] Ethnic Dietary Needs:    [x] None Identified   [] Identified and Addressed     [x] Interdisciplinary Care Plan Reviewed/Documented    [x] Discharge Planning:   Low purine diet    MONITORING /EVALUATION:      Food/Nutrient Intake Outcomes:  Total energy intake  Physical Signs/Symptoms Outcomes: Weight/weight change, Electrolyte and renal profile, GI    NUTRITION RISK:    [] High              [] Moderate           [x]  Low  []  Minimal/Uncompromised    PT SEEN FOR:    []  MD Consult: []Calorie Count      []Diabetic Diet Education        []Diet Education     []Electrolyte Management     []General Nutrition Management and Supplements     []Management of Tube Feeding     []TPN Recommendations    [x]  RN Referral:  [x]MST score >=2     []Enteral/Parenteral Nutrition PTA     []Pregnant: Gestational DM or Multigestation     []Pressure Ulcer/Wound Care needs        []  Low BMI  []  LOS Referral       Melo Anderson, RDN  Pager [phone]  Weekend Pager [phone]

## 2018-07-03 NOTE — PROGRESS NOTES
Cardiologist Dr Anand Randall at patient's bedside. Dr Anand Randall notified of patient's heart rate increasing with activity up into the 160's. Dr stated that due to patient's heart rhythm of atrial flutter this was expected. See MAR for medications changed to control patient's heart rate. Dr Anand Randall stated he did not need to be notified of patient's increased heart rate in future.

## 2018-07-03 NOTE — PROGRESS NOTES
Initial Assessment/Low      Reason for Admission: Sever Sepsis                     RRAT Score:   3           Plan for utilizing home health:      No recommendations made at this time. Pt has not had any previous history of having [facility] services. Likelihood of Readmission:  low                         Transition of Care Plan:   Pt Likely to discharge home with follow-up appointments. Pt is a 75 yo pt admitted on 7/1/18 for severe sepsis. Pt was sitting in chair bedside. Pt wife was bedside. Pt reports to live in a 2 story home with wife and son. Pt reports to be independent in managing ADLs including driving. Pt owns no DME at home. Pt reports to not have received any HH services in the past nor has he had inpatient rehab. [facility]. At discharge Pt anticipates wife transporting him home. CM will continue to follow-up with discharge planning.        Yonathan Arreola, [place], [employer]  [phone]

## 2018-07-03 NOTE — PROGRESS NOTES
Patient's daughter called unit for update. Patient and family made aware. Patient and wife stated they would call daughter to give update.

## 2018-07-03 NOTE — PROGRESS NOTES
Hospitalist Progress Note    NAME: Alan Judge Sr.   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 Heavy Tobacco abuse history   Heavy ETOH abuse history - One pint of whiskey every two days- at high risk for withdrawal    ? Copd with smoking history  Needs PFT as out patient  Follow up with Dr. Suzanna Ribera as out patient    Aflutter  Echo with ef 55-60% no wall motion abnormalities  Seen by the cardiology this admission  On asa, diltiazem      haroon  On ? ckd cr 2.2  Improving, will monitor  Follow the renal sono    tsh borderline high, which was check when he is critically ill, would repeat in next couple of weeks     Body mass index is 27.31 kg/(m^2). over weight    Code status: full  Prophylaxis: heparin  Recommended Disposition: tbd     Subjective:     Chief Complaint / Reason for Physician Visit  Sitting in the chair, feel ok,     Discussed with RN events overnight. Review of Systems:  Symptom Y/N Comments  Symptom Y/N Comments   Fever/Chills n   Chest Pain n    Poor Appetite    Edema     Cough n   Abdominal Pain n    Sputum    Joint Pain     SOB/DANIELS    Pruritis/Rash     Nausea/vomit    Tolerating PT/OT     Diarrhea    Tolerating Diet     Constipation    Other       Could NOT obtain due to:      Objective:     VITALS:   Last 24hrs VS reviewed since prior progress note.  Most recent are:  Patient Vitals for the past 24 hrs:   Temp Pulse Resp BP SpO2   07/03/18 1132 97.6 °F (36.4 °C) 91 20 117/64 100 %   07/03/18 0718 97.4 °F (36.3 °C) 80 20 122/78 98 %   07/03/18 0303 97.6 °F (36.4 °C) 84 20 121/73 100 %   07/02/18 2242 98.2 °F (36.8 °C) 89 20 98/69 98 %   07/02/18 1840 98.2 °F (36.8 °C) 66 20 109/54 99 %   07/02/18 1724 98.4 °F (36.9 °C) 87 20 117/71 98 %   07/02/18 1600 98.3 °F (36.8 °C) 83 20 128/63 98 %   07/02/18 1500 - 86 20 105/62 96 %   07/02/18 1400 - 88 17 100/71 98 %       Intake/Output Summary (Last 24 hours) at 07/03/18 1359  Last data filed at 07/03/18 1355   Gross per 24 hour   Intake             1825 ml   Output              200 ml   Net             1625 ml        PHYSICAL EXAM:  General:  Alert,  cooperative, no acute distress    EENT:  EOMI. Anicteric sclerae. MMM  Resp:  B/l decreased air entry, no cracklesNo accessory muscle use  CV:  Regular  rhythm,  No edema  GI:  Soft, Non distended, Non tender.  +Bowel sounds  Neurologic:  Alert and oriented X 3, normal speech,   Psych:   Good insight. Not anxious nor agitated  Skin:  No rashes. No jaundice, right 2 nd finger tophi, left elbow boggy swelling no tenderness  Reviewed most current lab test results and cultures  YES  Reviewed most current radiology test results   YES  Review and summation of old records today    NO  Reviewed patient's current orders and MAR    YES  PMH/SH reviewed - no change compared to H&P  ________________________________________________________________________  Care Plan discussed with:    Comments   Patient y    Family      RN y    Care Manager     Consultant                        Multidiciplinary team rounds were held today with , nursing, pharmacist and clinical coordinator. Patient's plan of care was discussed; medications were reviewed and discharge planning was addressed.      ________________________________________________________________________  Total NON critical care TIME: 25  Minutes    Total CRITICAL CARE TIME Spent:   Minutes non procedure based      Comments   >50% of visit spent in counseling and coordination of care     ________________________________________________________________________  Newton Arguello MD     Procedures: see electronic medical records for all procedures/Xrays and details which were not copied into this note but were reviewed prior to creation of Plan. LABS:  I reviewed today's most current labs and imaging studies.   Pertinent labs include:  Recent Labs      07/03/18   0139 07/02/18   0452  07/01/18   1123   WBC  9.1  9.6  14.4*   HGB  10.3*  10.4*  12.9   HCT  29.2*  29.3*  36.9   PLT  150  162  223     Recent Labs      07/03/18   0139 07/02/18 0452  07/01/18   1822  07/01/18   1123   NA  140  143   --   140   K  4.9  5.5*   --   5.9*   CL  113*  116*   --   110*   CO2  19*  19*   --   20*   GLU  152*  113*   --   83   BUN  33*  40*   --   60*   CREA  1.35*  1.57*   --   2.21*   CA  8.3*  8.0*   --   8.9   MG   --    --   1.7  2.0   ALB   --    --    --   3.1*   TBILI   --    --    --   0.7   SGOT   --    --    --   23   ALT   --    --    --   46   INR   --    --   1.1  1.0       Signed: Newton Arguello MD

## 2018-07-03 NOTE — PROGRESS NOTES
Occupational Therapy EVALUATION/discharge  Patient: Linnette Lynch . (77 y.o. male)  Date: 7/3/2018  Primary Diagnosis: Severe sepsis ([address]        Precautions: Universal      ASSESSMENT:   Based on the objective data described below, the patient presents with ability to complete basic ADL tasks and transfers without AD. Nursing cleared for therapy. He lives with wife, drives and works as supervisor at [employer]. Completed LB dressing and toileting with mod indep-indep . Reports pain in B hands from tophaceous gout. HR on portable tele monitor 110-123, fluctuating through out. Brief ed on energy conservation techniques for returning home to pace himself, good understanding. Denies any concerns with dc home when medically stable. Further skilled acute occupational therapy is not indicated at this time. Discharge Recommendations: None  Further Equipment Recommendations for Discharge: none      SUBJECTIVE:   Patient stated I am better now.     OBJECTIVE DATA SUMMARY:   HISTORY:   Past Medical History:   Diagnosis Date    Ill-defined condition     gout   History reviewed. No pertinent surgical history. Prior Level of Function/Environment/Context: Home with wife. Indep with ADL tasks. No AD. Works as supervisor at [employer]  Occupations in which the patient is/was successful, what are the barriers preventing that success:   Performance Patterns (routines, roles, habits, and rituals):   Personal Interests and/or values:   Expanded or extensive additional review of patient history:     Home Situation  Home Environment: Private residence  # Steps to Enter: 2  Rails to Enter: Yes  Hand Rails : Right  Wheelchair Ramp: No  One/Two Story Residence: Two story  # of Interior Steps: 12  Interior Rails: Right (wall on L)  Lift Chair Available: No  Living Alone: No  Support Systems: Family member(s), Spouse/Significant Other/Partner  Patient [facility]s [facility]  Current DME Used/Available at Home: None  Tub or Shower Type: Shower    Hand dominance: Right    EXAMINATION OF PERFORMANCE DEFICITS:  Cognitive/Behavioral Status:  Neurologic State: Alert  Orientation Level: Oriented X4  Cognition: Follows commands             Skin: visible intact    Edema: uppers none    Hearing: Auditory  Auditory Impairment: Hard of hearing, bilateral    Vision/Perceptual:                                Corrective Lenses: Reading glasses    Range of Motion:  AROM: Within functional limits  PROM: Within functional limits                      Strength:  Strength: Within functional limits        Coordination:  Coordination: Within functional limits  Fine Motor Skills-Upper: Left Intact; Right Intact    Gross Motor Skills-Upper: Left Intact; Right Intact    Tone & Sensation:  Tone: Normal  Sensation: Intact           Balance:  Sitting: Intact  Standing: Intact    Functional Mobility and Transfers for ADLs:  Bed Mobility:       Transfers:  Sit to Stand: Independent  Stand to Sit: Independent    ADL Assessment:  Feeding: Independent  Oral Facial Hygiene/Grooming: Independent  Bathing: Modified independent; Additional time  Upper Body Dressing: Independent  Lower Body Dressing: Modified independent; Additional time  Toileting: Independent        Functional Measure:  Barthel Index:    Bathin  Bladder: 10  Bowels: 10  Groomin  Dressing: 10  Feeding: 10  Mobility: 15  Stairs: 10  Toilet Use: 10  Transfer (Bed to Chair and Back): 15  Total: 100       Barthel and G-code impairment scale:  Percentage of impairment CH  0% CI  1-19% CJ  20-39% CK  40-59% CL  60-79% CM  80-99% CN  100%   Barthel Score 0-100 100 99-80 79-60 59-40 20-39 1-19   0   Barthel Score 0-20 20 17-19 13-16 9-12 5-8 1-4 0      The Barthel ADL Index: Guidelines  1. The index should be used as a record of what a patient does, not as a record of what a patient could do.   2. The main aim is to establish degree of independence from any help, physical or verbal, however minor and for whatever reason. 3. The need for supervision renders the patient not independent. 4. A patient's performance should be established using the best available evidence. Asking the patient, friends/relatives and nurses are the usual sources, but direct observation and common sense are also important. However direct testing is not needed. 5. Usually the patient's performance over the preceding 24-48 hours is important, but occasionally longer periods will be relevant. 6. Middle categories imply that the patient supplies over 50 per cent of the effort. 7. Use of aids to be independent is allowed. Elif Romero., Barthel, D.W. (6098). Functional evaluation: the Barthel Index. [facility] (14)2. Adonis Espinosa valery MICHAEL Monroy, Garth Stinson., Bianca Paulson., Anuj, [address] (1999). Measuring the change indisability after inpatient rehabilitation; comparison of the responsiveness of the Barthel Index and Functional Floyd Measure. Journal of Neurology, Neurosurgery, and Psychiatry, 66(4), 470-688. Kelsi Zaragoza, N.J.A, JERRI Escalante, & Bre Mustafa, M.A. (2004.) Assessment of post-stroke quality of life in cost-effectiveness studies: The usefulness of the Barthel Index and the EuroQoL-5D. Quality of Life Research, 13, 115-09         G codes: In compliance with CMSs Claims Based Outcome Reporting, the following G-code set was chosen for this patient based on their primary functional limitation being treated: The outcome measure chosen to determine the severity of the functional limitation was the Barthel Index with a score of 100/100 which was correlated with the impairment scale. ?  Self Care:     - CURRENT STATUS: CH - 0% impaired, limited or restricted    - GOAL STATUS: CH - 0% impaired, limited or restricted    - D/C STATUS:  CH - 0% impaired, limited or restricted     Occupational Therapy Evaluation Charge Determination   History Examination Decision-Making   LOW Complexity : Brief history review  LOW Complexity : 1-3 performance deficits relating to physical, cognitive , or psychosocial skils that result in activity limitations and / or participation restrictions  LOW Complexity : No comorbidities that affect functional and no verbal or physical assistance needed to complete eval tasks       Based on the above components, the patient evaluation is determined to be of the following complexity level: LOW   Pain:  Pain Scale 1: Numeric (0 - 10)  Pain Intensity 1: 0        Activity Tolerance:   -123 seen on portable tele. After treatment:   [x]  Patient left in no apparent distress sitting up in chair  []  Patient left in no apparent distress in bed  [x]  Call bell left within reach  [x]  Nursing notified  [x]  Caregiver present  [x]  Bed alarm activated    COMMUNICATION/EDUCATION:   Communication/Collaboration:  [x]      Home safety education was provided and the patient/caregiver indicated understanding. [x]      Patient/family have participated as able and agree with findings and recommendations. []      Patient is unable to participate in plan of care at this time.   Findings and recommendations were discussed with: Physical Therapist, Registered Nurse and Pateint    Earl Carrel, OT  Time Calculation: 14 mins

## 2018-07-03 NOTE — CONSULTS
[facility] INTENSIVIST Consult Service Note  Pulmonary, Critical Care, and Sleep Medicine    Name: Joselyn Mathew Sr. MRN: [id number]   : 1950 Hospital: [facility]   Date: 7/3/2018   Hospital Day: 3       Subjective/Interval History:   I have reviewed the notes from other providers and old records readily available and summarized findings below with the flowsheet. Seen earlier today on rounds. Pt is unstable and acutely ill in the CCU. Pt is feeling better today. Has improved breathing. NO chest pain, no pressure. Has mild nonproductive cough. No GERD. No sinus issues. He has no peripheral edema. Slept ok last pm.   He works as an industrial sandblaster. Smokes about 1ppd. Moderate Etoh use. Reviewed labs, CXR, echo with pt and his wife. Patient Active Problem List   Diagnosis Code    Severe sepsis ([facility]) A41.9, R65.20       IMPRESSION:   1. Severe sepsis with septic shock; now better. 2. Atrial flutter  / Tachycardia now slower-Followed by Dr. Lilia Joseph. 3. Possible COPD - due to 1/5 ppd smoking for over 40 years and clinical examination highly suggestive of COPD without acute exacerbation, Will need outpt follow up with me and PFTs. 4. Has normal LVEF and mildly decreased RVEF?   5. Acute uncontrolled Tophaceous gout  6. Hx of squamous cell CA on various parts of his body s/p blue light Rx.   7. Heavy Tobacco abuse history   8. Heavy ETOH abuse history - One pint of whiskey every two days- at high risk for withdrawal  9. Multiorgan dysfunction as outlined above: Pt has one or more acute or chronic illnesses with severe exacerbation with progression or side effects of treatment that poses a threat to life or bodily function      RECOMMENDATIONS/PLAN:   1. Will need to follow up with me upon discharge. Ph: [phone] for appt. 2. Will need outpt PFTs  3. Colchicine- Insurance would not cover this  4. Wound care consult  5.  So far Cx are negative. 6. Monitor for withdrawal, so far negative. 7. Smoking cessation counseling  8. Bronchial hygiene with respiratory therapy techniques, bronchodilators  9. Pt needs IV fluids with additives and Drug therapy requiring intensive monitoring for toxicity  10. Prescription drug management with home med reconciliation reviewed  11. DVT, SUP prophylaxis  12. Will be available to assist in medical management while in the CCU pending disposition     My assessment/management discussed with: Nursing, Pharmacy, Case Management, PT, OT, Respiratory Therapy, Hospitalist and Family for coordination of care      Subjective/Initial History:   I have reviewed the flowsheet and previous days notes. Seen earlier today on rounds. I was asked by Paul Munguia MD to see Otilia De Leon Sr. a 76 y.o.  male  in consultation for a chief complaint of sepsis with shock. Excerpts from admission and consult notes reviewed as follows: \"The pt was 'fine ' about two weeks ago. He developed a cough with some feverish episodes for which his PCP prescribed steroids PO, PO doxycycline. The pt states that after taking those medicines for three to four days he developed tremulousness and felt worse. Therefore, he stopped taking those medicines. He continued to feel unwell. During this time he continues with his work and at work about a week ago he felt lightheaded and noticed blind spots and he was unable to move due to acute weakness. He took time off ( I believe) and spent resting at home last few days. He didn't listen to his wife's request and didn't come to ER when he felt unwell last Monday. This morning he was brought to ER and since then workup has revealed above findings. 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 He is not exposed to the sandblasting process.  \"    Frustrated because [facility] trying to treat gout but insurance company fails to cover any of the meds. Punctured tophae on knuckles. Weaned off pressors. PMH:  has a past medical history of Ill-defined condition. PSH:   has no past surgical history on file. FHX: family history is not on file. SHX:  reports that he has been smoking. He has been smoking about 1.50 packs per day. He has quit using smokeless tobacco. He reports that he drinks about 6.0 oz of alcohol per week  He reports that he does not use illicit drugs. ROS:A comprehensive review of systems was negative except for that written in the HPI.       No Known Allergies Medications:      MEDS:   Current Facility-Administered Medications   Medication    aspirin chewable tablet 81 mg    dilTIAZem CD (CARDIZEM CD) capsule 180 mg    chlordiazePOXIDE (LIBRIUM) capsule 5 mg    colchicine tablet 0.6 mg    Thiamine Mononitrate (B-1) tablet 611 mg    folic acid (FOLVITE) tablet 1 mg    therapeutic multivitamin (THERAGRAN) tablet 1 Tab    vancomycin (VANCOCIN) 1250 mg in  ml infusion    sodium chloride (NS) flush 5-10 mL    sodium chloride (NS) flush 5-10 mL    sodium chloride (NS) flush 5-10 mL    acetaminophen (TYLENOL) tablet 500 mg    HYDROcodone-acetaminophen (NORCO) 5-325 mg per tablet 1 Tab    naloxone (NARCAN) injection 0.4 mg    ondansetron (ZOFRAN) injection 2 mg    bisacodyl (DULCOLAX) tablet 5 mg    nicotine (NICODERM CQ) 7 mg/24 hr patch 1 Patch    heparin (porcine) injection 5,000 Units    cefepime (MAXIPIME) 2 g in 0.9% sodium chloride (MBP/ADV) 100 mL    mupirocin (BACTROBAN) 2 % ointment        Current Facility-Administered Medications:     aspirin chewable tablet 81 mg, 81 mg, Oral, DAILY, Esteban Amaya MD    dilTIAZem CD (CARDIZEM CD) capsule 180 mg, 180 mg, Oral, DAILY, Esteban Amaya MD    chlordiazePOXIDE (LIBRIUM) capsule 5 mg, 5 mg, Oral, QID PRN, Kirti Mcginnis MD    colchicine tablet 0.6 mg, 0.6 mg, Oral, DAILY, Kirti Mcginnis MD, 0.6 mg at 07/03/18 4749    Thiamine Mononitrate (B-1) tablet 100 mg, 100 mg, Oral, DAILY, Gisele Pardo MD, 100 mg at 08/51/17 0385    folic acid (FOLVITE) tablet 1 mg, 1 mg, Oral, DAILY, Gisele Pardo MD, 1 mg at 07/03/18 0816    therapeutic multivitamin (THERAGRAN) tablet 1 Tab, 1 Tab, Oral, DAILY, Gisele Pardo MD, 1 Tab at 07/03/18 0816    vancomycin (VANCOCIN) 1250 mg in  ml infusion, 1,250 mg, IntraVENous, Q18H, Nicolas Dhillon MD, Last Rate: 125 mL/hr at 07/02/18 1422, 1,250 mg at 07/02/18 1422    sodium chloride (NS) flush 5-10 mL, 5-10 mL, IntraVENous, Q8H, Marina Limon DO, 10 mL at 07/02/18 1304    sodium chloride (NS) flush 5-10 mL, 5-10 mL, IntraVENous, Q8H, Nicolas Dhillon MD, 10 mL at 07/03/18 0551    sodium chloride (NS) flush 5-10 mL, 5-10 mL, IntraVENous, PRN, Nicolas Dhillon MD    acetaminophen (TYLENOL) tablet 500 mg, 500 mg, Oral, Q4H PRN, Nicolas Dhillon MD    HYDROcodone-acetaminophen (NORCO) 5-325 mg per tablet 1 Tab, 1 Tab, Oral, Q6H PRN, Nicolas Dhillon MD    [facility]) injection 0.4 mg, 0.4 mg, IntraVENous, PRN, Nicolas Dhillon MD    ondansetron [facility]) injection 2 mg, 2 mg, IntraVENous, Q6H PRN, Nicolas Dhillon MD    bisacodyl (DULCOLAX) tablet 5 mg, 5 mg, Oral, DAILY PRN, Nicolas Dhillon MD    nicotine (NICODERM CQ) 7 mg/24 hr patch 1 Patch, 1 Patch, TransDERmal, DAILY PRN, Nicolas Dhillon MD    heparin (porcine) injection 5,000 Units, 5,000 Units, SubCUTAneous, Q8H, Nicolas Dhillon MD, 5,000 Units at 07/03/18 0138    cefepime (MAXIPIME) 2 g in 0.9% sodium chloride (MBP/ADV) 100 mL, 2 g, IntraVENous, Q12H, Nicolas Dhillon MD, Last Rate: 200 mL/hr at 07/03/18 0547, 2 g at 07/03/18 0547    mupirocin (BACTROBAN) 2 % ointment, , Both Nostrils, Q12H, Raquel Herron MD      Objective:     Vital Signs: Telemetry:    normal sinus rhythm Intake/Output:   Visit Vitals    /78 (BP 1 Location: Right arm, BP Patient Position: At rest)    Pulse 80    Temp 97.4 °F (36.3 °C)    Resp 20    Ht 5' 9\" (1.753 m)    Wt 83.9 kg (184 lb 15.5 oz)    SpO2 98%    BMI 27.31 kg/m2       Temp (24hrs), Av.1 °F (36.7 °C), Min:97.4 °F (36.3 °C), Max:98.4 °F (36.9 °C)        O2 Device: Room air       Body mass index is 27.31 kg/(m^2). Wt Readings from Last 4 Encounters:   18 83.9 kg (184 lb 15.5 oz)   18 82.1 kg (181 lb)   10/21/17 82.1 kg (181 lb)          Intake/Output Summary (Last 24 hours) at 18 6191  Last data filed at 18 7053   Gross per 24 hour   Intake             1970 ml   Output              450 ml   Net             1520 ml       Last shift:      701 -  190  In: 240 [P.O.:240]  Out: -   Last 3 shifts: 1901 -  0700  In: 4593.8 [P.O.:1440; I.V.:3153.8]  Out: 2950 [Urine:2950]         Physical Exam:     General:  male; in no respiratory distress and acyanotic; room air;   HEAD: Normocephalic, without obvious abnormality, atraumatic   EYES: conjunctivae clear. PERRL,  AN Icteric sclerae   NOSE: nares normal, no drainage, no nasal flaring,    THROAT: Lips, mucosa dry; No Thrush; class 3 airway;  tongue midline   Neck: Supple, symmetrical, trachea midline,  No accessory mm use; No Stridor/ cuff leak, No goiter or thyroid tenderness   LYMPH: No abnormally enlarged lymph nodes. in neck or groin   Chest: normal   Lungs: decreased air exchange bibasilarly. No wheezing, no rhonchia. Heart: Regular rate and rhythm; nl s1,2. NO MRG. Abdomen: soft, nontender   : Hayes; clear urine; NO gross hematuria   Extremity: negative, clubbing, multiple joint deformities from tophaceous gout; joint swelling and erythema   Neuro: alert; speech fluent withdraws to pain; unable to check gait and station; does follow simple commands   Psych: oriented to time, place and person ; mild agitation;    Skin: Warm;    Pulses:Bilateral, Radial, 2+   Capillary refill: normal; well perfused, Neuro: intact. Good strength of BUE and BLE.  Psych: no anxiety or depression      Data: All lab results for the last 24 hours reviewed. Labs:    Recent Labs      07/03/18   0139  07/02/18   0452  07/01/18   1822  07/01/18   1123   WBC  9.1  9.6   --   14.4*   HGB  10.3*  10.4*   --   12.9   PLT  150  162   --   223   INR   --    --   1.1  1.0     Recent Labs      07/03/18   0139  07/02/18   0452  07/01/18   1822 07/01/18   1123   NA  140  143   --   140   K  4.9  5.5*   --   5.9*   CL  113*  116*   --   110*   CO2  19*  19*   --   20*   GLU  152*  113*   --   83   BUN  33*  40*   --   60*   CREA  1.35*  1.57*   --   2.21*   CA  8.3*  8.0*   --   8.9   MG   --    --   1.7  2.0   LAC   --    --   0.9   --    ALB   --    --    --   3.1*   SGOT   --    --    --   23   ALT   --    --    --   46     Recent Labs      07/01/18   1650   PH  7.38   PCO2  29*   PO2  87   HCO3  17*     Recent Labs      07/01/18   1123   CPK  30*   CKNDX  3.7*   TROIQ  <0.05     No results found for: BNPP, BNP   Lab Results   Component Value Date/Time    Culture result: MRSA NOT PRESENT 07/01/2018 08:51 PM    Culture result:  07/01/2018 08:51 PM         Screening of patient nares for MRSA is for surveillance purposes and, if positive, to facilitate isolation considerations in high risk settings. It is not intended for automatic decolonization interventions per se as regimens are not sufficiently effective to warrant routine use. Culture result: NO GROWTH 2 DAYS 07/01/2018 11:23 AM     Lab Results   Component Value Date/Time    CK 30 (L) 07/01/2018 11:23 AM         Imaging:  I have personally reviewed the patients radiographs and have reviewed the reports:          Results from Hospital Encounter encounter on 07/01/18   XR CHEST PORT   Narrative EXAM:  XR CHEST PORT    INDICATION:  cvl PLACEMENT    COMPARISON:  7/1/2018    FINDINGS: A portable AP radiograph of the chest was obtained at 1619 hours. Right IJ catheter tip overlies SVC. There is no pneumothorax. Visualized lungs  are clear. . . Heart size is stable. Vinay Kamara Impression IMPRESSION: Right IJ catheter tip overlies SVC. There is no pneumothorax. No results found for this or any previous visit. This care involved high complexity decision making which includes independently reviewing the patient's past medical records, current laboratory results, medication profiles that were immediately available to me and actual Xray images at the bedside in order to assess, support vital system function, and to treat this degree of vital organ system failure, and to prevent further deterioration of the patients condition. Medical Decision Making Today:  · Reviewed the flowsheet and previous days notes  · Reviewed and summarized records or history from previous days note or discussions with staff, family  · Parenteral controlled substances - Reviewed/ Adjusted / Annalee One[place] / Started  · High Risk Drug therapy requiring intensive monitoring for toxicity: eg steroids, pressors, antibiotics  · MAR reviewed and pertinent medications noted or modified as needed  · Reviewed and/or ordered Clinical lab tests  · Reviewed and/or ordered Radiology tests  · Reviewed and/or ordered of Medicine tests  · Independently visualized radiologic Images  · I have personally reviewed the patients ECG / Telemetry  ·          Thank you for allowing us to participate in the care of this patient. We will be happy to follow along with you.     Lory Anthony MD

## 2018-07-03 NOTE — ROUTINE PROCESS
Bedside shift change report given to LORENZO Goldstein (oncoming nurse) by Sherice De La Cruz RN (offgoing nurse). Report included the following information SBAR, Kardex, Intake/Output, MAR, Accordion, Recent Results, Med Rec Status and Cardiac Rhythm a flutter.

## 2018-07-03 NOTE — PROGRESS NOTES
Physical Therapy Note    Acknowledge PT orders, completed chart review and attempted PT eval.  Patient HR on monitor 100-147 seen on monitor. Discussed with nursing. Will follow up for PT eval later in the day as appropriate.     Thank you,  Tanner Tobin, PT, DPT

## 2018-07-03 NOTE — PROGRESS NOTES
Occupational Therapy    Acknowledge OT orders, completed chart review and attempted OT eval.  Patient HR on monitor 100-147 seen on monitor. Discussed with nursing. Will follow up for OT eval later in the day.      Thank you,    Irving Philip OTR/L

## 2018-07-03 NOTE — PROGRESS NOTES
Progress Note      7/3/2018 9:02 AM  NAME: Alan Judge Sr. MRN:  [id number]   Admit Diagnosis: Severe sepsis [facility])     Assessment:     New onset Atrial Flutter. Rate controlled. ChadsVasc score 1. Sepsis. Chronic tobacco abuse. COPD  Tophaceous gout, with acute exacerbation. Alcohol use 2/d . Plan:     Aspirin   Add some diltiazem for rate control           []        High complexity decision making was performed    Subjective:     Alan Judge Sr. denies chest pain, dyspnea. Discussed with RN events overnight. Patient Active Problem List   Diagnosis Code    [facility] A41.9, R65.20       Review of Systems:    Symptom Y/N Comments  Symptom Y/N Comments   Fever/Chills N   Chest Pain N    Poor Appetite N   Edema N    Cough N   Abdominal Pain N    Sputum N   Joint Pain N    SOB/DANIELS N   Pruritis/Rash N    Nausea/vomit N   Tolerating PT/OT Y    Diarrhea N   Tolerating Diet Y    Constipation N   Other       Could NOT obtain due to:      Objective:      Physical Exam:    Last 24hrs VS reviewed since prior progress note. Most recent are:    Visit Vitals    /78 (BP 1 Location: Right arm, BP Patient Position: At rest)    Pulse 80    Temp 97.4 °F (36.3 °C)    Resp 20    Ht 5' 9\" (1.753 m)    Wt 83.9 kg (184 lb 15.5 oz)    SpO2 98%    BMI 27.31 kg/m2       Intake/Output Summary (Last 24 hours) at 07/03/18 0902  Last data filed at 07/03/18 8986   Gross per 24 hour   Intake             1970 ml   Output              450 ml   Net             1520 ml        General Appearance: Well developed, well nourished, alert & oriented x 3,    no acute distress. Ears/Nose/Mouth/Throat: Hearing grossly normal.  Neck: Supple. Chest: Lungs clear to auscultation bilaterally. Cardiovascular: Regular rate and rhythm, S1S2 normal, no murmur. Abdomen: Soft, non-tender, bowel sounds are active. Extremities: No edema bilaterally. Skin: Warm and dry.     PMH/SH reviewed - no change compared to H&P    Data Review    Telemetry: normal sinus rhythm     Lab Data Personally Reviewed:    Recent Labs      07/03/18   0139  07/02/18   0452   WBC  9.1  9.6   HGB  10.3*  10.4*   HCT  29.2*  29.3*   PLT  150  162   LABRCNT(INR:3,PTP:3,APTT:3,)  Recent Labs      07/03/18   0139  07/02/18   0452  07/01/18   1822  07/01/18   1123   NA  140  143   --   140   K  4.9  5.5*   --   5.9*   CL  113*  116*   --   110*   CO2  19*  19*   --   20*   BUN  33*  40*   --   60*   CREA  1.35*  1.57*   --   2.21*   GLU  152*  113*   --   83   CA  8.3*  8.0*   --   8.9   MG   --    --   1.7  2.0   LABRCNT(CPK:3,CpKMB:3,ckndx:3,troiq:3)No results found for: CHOL, CHOLX, CHLST, CHOLV, HDL, LDL, LDLC, DLDLP, TGLX, TRIGL, TRIGP, CHHD, CHHDXLABRCNT(sgot:3,gpt:3,ap:3,tbiL:3,TP:3,ALB:3,GLOB:3,ggt:3,aml:3,amyp:3,lpse:3,hlpse:3)  Recent Labs      07/01/18   1650   PH  7.38   PCO2  29*   PO2  87     No results found for: CHOL, CHOLX, CHLST, CHOLV, HDL, LDL, LDLC, DLDLP, TGLX, TRIGL, TRIGP, CHHD, CHHDXMEDTABLEShashi Maria MD  Recent Labs      07/01/18   1650   PH  7.38   PCO2  29*   PO2  87       Medications Personally Reviewed:    Current Facility-Administered Medications   Medication Dose Route Frequency    aspirin chewable tablet 81 mg  81 mg Oral DAILY    dilTIAZem CD (CARDIZEM CD) capsule 180 mg  180 mg Oral DAILY    chlordiazePOXIDE (LIBRIUM) capsule 5 mg  5 mg Oral QID PRN    colchicine tablet 0.6 mg  0.6 mg Oral DAILY    Thiamine Mononitrate (B-1) tablet 100 mg  100 mg Oral DAILY    folic acid (FOLVITE) tablet 1 mg  1 mg Oral DAILY    therapeutic multivitamin (THERAGRAN) tablet 1 Tab  1 Tab Oral DAILY    vancomycin (VANCOCIN) 1250 mg in  ml infusion  1,250 mg IntraVENous Q18H    sodium chloride (NS) flush 5-10 mL  5-10 mL IntraVENous Q8H    sodium chloride (NS) flush 5-10 mL  5-10 mL IntraVENous Q8H    sodium chloride (NS) flush 5-10 mL  5-10 mL IntraVENous PRN    acetaminophen (TYLENOL) tablet 500 mg 500 mg Oral Q4H PRN    HYDROcodone-acetaminophen (NORCO) 5-325 mg per tablet 1 Tab  1 Tab Oral Q6H PRN    naloxone (NARCAN) injection 0.4 mg  0.4 mg IntraVENous PRN    ondansetron (ZOFRAN) injection 2 mg  2 mg IntraVENous Q6H PRN    bisacodyl (DULCOLAX) tablet 5 mg  5 mg Oral DAILY PRN    nicotine (NICODERM CQ) 7 mg/24 hr patch 1 Patch  1 Patch TransDERmal DAILY PRN    heparin (porcine) injection 5,000 Units  5,000 Units SubCUTAneous Q8H    cefepime (MAXIPIME) 2 g in 0.9% sodium chloride (MBP/ADV) 100 mL  2 g IntraVENous Q12H    mupirocin (BACTROBAN) 2 % ointment   Both Nostrils Q12H         Ministerio Mckeon MD

## 2018-07-03 NOTE — PROGRESS NOTES
7/3/2018      RE: Leopold Custard Sr. To Whom it May Concern: This is to certify that Leopold Custard Sr. hospitalized for medical reasons since July. Currently inpatient. Please feel free to contact my office if you have any questions or concerns. Thank you for your assistance in this matter.     Sincerely,      Jeremy Gonzales MD

## 2018-07-03 NOTE — PROGRESS NOTES
physical Therapy EVALUATION/DISCHARGE  Patient: Elizabeth Stephens Sr. (77 y.o. male)  Date: 7/3/2018  Primary Diagnosis: Severe sepsis ([address]        Precautions:      ASSESSMENT :  Based on the objective data described below, the patient presents with mild dyspnea upon exertion. Discussed with nursing and cleared to mobilize. Pt received sitting in chair with wife present at beginning of session. VSS on RA, HR 90 bpm at rest. Pt transferred sit to stand independently and ambulated with SBA without  feet. Pt noted his gait speed was slightly slower than at baseline. HR increased to 128bpm during ambulation. Pt denied SOB and did not exhibit any LOB. He stated was not concerned with the stairs at home. Pt returned to room and transferred stand to sit independently. Discussed and educated pt on safety awareness and ascending/descending stairs with family around initially after discharge. Pt stated he had no concerns with returning home. Ended session with pt sitting in chair with call bell within reach. No acute PT needed at this time, pt is at baseline for functional mobility. Skilled physical therapy is not indicated at this time. PLAN :  Discharge Recommendations: None  Further Equipment Recommendations for Discharge: none     SUBJECTIVE:   Patient stated My feet were numb from my gout, but they're not anymore.     OBJECTIVE DATA SUMMARY:   HISTORY:    Past Medical History:   Diagnosis Date    Ill-defined condition     gout   History reviewed. No pertinent surgical history. Prior Level of Function/Home Situation: Pt living at home with wife. 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 Pt is independent in all ADLs and ambulating without AD. Pt is still driving. Has no DME at home.    Personal factors and/or comorbidities impacting plan of care: Gout    Home Situation  Home Environment: Private residence  # Steps to Enter: 2  Rails to Enter: Yes  Hand Rails : Right  Wheelchair Ramp: No  One/Two Story Residence: Two story  # of Interior Steps: 12  Interior Rails: Right (wall on L)  Lift Chair Available: No  Living Alone: No  Support Systems: Family member(s), Spouse/Significant Other/Partner  Patient Expects to be Discharged to[de-identified] Private residence  Current DME Used/Available at Home: None  Tub or Shower Type: Shower    EXAMINATION/PRESENTATION/DECISION MAKING:   Critical Behavior:  Neurologic State: Alert  Orientation Level: Oriented X4  Cognition: Follows commands     Hearing: Auditory  Auditory Impairment: Hard of hearing, bilateral  Skin:  intact  Edema: none  Range Of Motion:  AROM: Within functional limits           PROM: Within functional limits           Strength:    Strength: Within functional limits                    Tone & Sensation:   Tone: Normal              Sensation: Intact               Coordination:  Coordination: Within functional limits  Vision:      Functional Mobility:  Bed Mobility:              Transfers:  Sit to Stand: Independent  Stand to Sit: Independent                       Balance:   Sitting: Intact  Standing: Intact  Ambulation/Gait Training:  Distance (ft): 200 Feet (ft)  Assistive Device: Gait belt  Ambulation - Level of Assistance: Stand-by assistance                                                Functional Measure:  Barthel Index:    Bathin  Bladder: 10  Bowels: 10  Groomin  Dressing: 10  Feeding: 10  Mobility: 15  Stairs: 10  Toilet Use: 10  Transfer (Bed to Chair and Back): 15  Total: 100       Barthel and G-code impairment scale:  Percentage of impairment CH  0% CI  1-19% CJ  20-39% CK  40-59% CL  60-79% CM  80-99% CN  100%   Barthel Score 0-100 100 99-80 79-60 59-40 20-39 1-19   0   Barthel Score 0-20 20 17-19 13-16 9-12 5-8 1-4 0      The Barthel ADL Index: Guidelines  1. The index should be used as a record of what a patient does, not as a record of what a patient could do.   2. The main aim is to establish degree of independence from any help, physical or verbal, however minor and for whatever reason. 3. The need for supervision renders the patient not independent. 4. A patient's performance should be established using the best available evidence. Asking the patient, friends/relatives and nurses are the usual sources, but direct observation and common sense are also important. However direct testing is not needed. 5. Usually the patient's performance over the preceding 24-48 hours is important, but occasionally longer periods will be relevant. 6. Middle categories imply that the patient supplies over 50 per cent of the effort. 7. Use of aids to be independent is allowed. Yoselin Knox., Barthel, D.W. (7259). Functional evaluation: the Barthel Index. [address] [facility] (14)2. Susan Andino valery MICHAEL Monroy, Danyell Schmitz., [facility]., [place], [address] (1999). Measuring the change indisability after inpatient rehabilitation; comparison of the responsiveness of the Barthel Index and Functional [place] Measure. Journal of Neurology, Neurosurgery, and Psychiatry, 66(4), 474-607. Chuy Ocasio, N.[place].A, JERRI Escalante, & Lyric Jean Baptiste, MSlickA. (2004.) Assessment of post-stroke quality of life in cost-effectiveness studies: The usefulness of the Barthel Index and the EuroQoL-5D. Quality of Life Research, 13, 186-44         G codes: In compliance with CMSs Claims Based Outcome Reporting, the following G-code set was chosen for this patient based on their primary functional limitation being treated: The outcome measure chosen to determine the severity of the functional limitation was the Barthel with a score of 100/100 which was correlated with the impairment scale.     ? Mobility - Walking and Moving Around:     - CURRENT STATUS: CH - 0% impaired, limited or restricted    - GOAL STATUS: CH - 0% impaired, limited or restricted    - D/C STATUS:  CH - 0% impaired, limited or restricted        Physical Therapy Evaluation Charge Determination   History Examination Presentation Decision-Making   MEDIUM  Complexity : 1-2 comorbidities / personal factors will impact the outcome/ POC  LOW Complexity : 1-2 Standardized tests and measures addressing body structure, function, activity limitation and / or participation in recreation  LOW Complexity : Stable, uncomplicated  Other outcome measures Barthel  LOW       Based on the above components, the patient evaluation is determined to be of the following complexity level: LOW     Pain:  Pain Scale 1: Numeric (0 - 10)  Pain Intensity 1: 0     Activity Tolerance:   WFL  Please refer to the flowsheet for vital signs taken during this treatment. After treatment:   [x]   Patient left in no apparent distress sitting up in chair  []   Patient left in no apparent distress in bed  [x]   Call bell left within reach  [x]   Nursing notified  []   Caregiver present  [x]   Bed alarm activated    COMMUNICATION/EDUCATION:   Communication/Collaboration:  [x]   Fall prevention education was provided and the patient/caregiver indicated understanding. [x]   Patient/family have participated as able and agree with findings and recommendations. []   Patient is unable to participate in plan of care at this time. Findings and recommendations were discussed with: Occupational Therapist and Registered Nurse    Thank you for this referral.  Orin Chaparro, [facility]   Time Calculation: 18 mins    Regarding student involvement in patient care:  A student participated in this treatment session. Per CMS Medicare statements and APTA guidelines I certify that the following was true:  1. I was present and directly observed the entire session. 2. I made all skilled judgments and clinical decisions regarding care. 3. I am the practitioner responsible for assessment, treatment, and documentation.

## 2018-07-04 LAB
ANION GAP SERPL CALC-SCNC: 10 MMOL/L (ref 5–15)
BASOPHILS # BLD: 0 K/UL (ref 0–0.1)
BASOPHILS NFR BLD: 0 % (ref 0–1)
BUN SERPL-MCNC: 29 MG/DL (ref 6–20)
BUN/CREAT SERPL: 23 (ref 12–20)
CALCIUM SERPL-MCNC: 8.4 MG/DL (ref 8.5–10.1)
CHLORIDE SERPL-SCNC: 114 MMOL/L (ref 97–108)
CO2 SERPL-SCNC: 20 MMOL/L (ref 21–32)
CREAT SERPL-MCNC: 1.26 MG/DL (ref 0.7–1.3)
DATE LAST DOSE: ABNORMAL
DIFFERENTIAL METHOD BLD: ABNORMAL
EOSINOPHIL # BLD: 0 K/UL (ref 0–0.4)
EOSINOPHIL NFR BLD: 0 % (ref 0–7)
ERYTHROCYTE [DISTWIDTH] IN BLOOD BY AUTOMATED COUNT: 14 % (ref 11.5–14.5)
GLUCOSE SERPL-MCNC: 84 MG/DL (ref 65–100)
HCT VFR BLD AUTO: 27.8 % (ref 36.6–50.3)
HGB BLD-MCNC: 9.6 G/DL (ref 12.1–17)
IMM GRANULOCYTES # BLD: 0 K/UL (ref 0–0.04)
IMM GRANULOCYTES NFR BLD AUTO: 0 % (ref 0–0.5)
LYMPHOCYTES # BLD: 2.3 K/UL (ref 0.8–3.5)
LYMPHOCYTES NFR BLD: 29 % (ref 12–49)
MCH RBC QN AUTO: 30.7 PG (ref 26–34)
MCHC RBC AUTO-ENTMCNC: 34.5 G/DL (ref 30–36.5)
MCV RBC AUTO: 88.8 FL (ref 80–99)
METAMYELOCYTES NFR BLD MANUAL: 1 %
MONOCYTES # BLD: 0.3 K/UL (ref 0–1)
MONOCYTES NFR BLD: 4 % (ref 5–13)
NEUTS BAND NFR BLD MANUAL: 2 %
NEUTS SEG # BLD: 5.3 K/UL (ref 1.8–8)
NEUTS SEG NFR BLD: 64 % (ref 32–75)
NRBC # BLD: 0 K/UL (ref 0–0.01)
NRBC BLD-RTO: 0 PER 100 WBC
PLATELET # BLD AUTO: 149 K/UL (ref 150–400)
PMV BLD AUTO: 9.9 FL (ref 8.9–12.9)
POTASSIUM SERPL-SCNC: 4.3 MMOL/L (ref 3.5–5.1)
RBC # BLD AUTO: 3.13 M/UL (ref 4.1–5.7)
RBC MORPH BLD: ABNORMAL
REPORTED DOSE,DOSE: ABNORMAL UNITS
REPORTED DOSE/TIME,TMG: ABNORMAL
SODIUM SERPL-SCNC: 144 MMOL/L (ref 136–145)
VANCOMYCIN TROUGH SERPL-MCNC: 13.9 UG/ML (ref 5–10)
WBC # BLD AUTO: 8 K/UL (ref 4.1–11.1)

## 2018-07-04 PROCEDURE — 74011000258 HC RX REV CODE- 258: Performed by: INTERNAL MEDICINE

## 2018-07-04 PROCEDURE — 80202 ASSAY OF VANCOMYCIN: CPT | Performed by: HOSPITALIST

## 2018-07-04 PROCEDURE — 74011250637 HC RX REV CODE- 250/637: Performed by: HOSPITALIST

## 2018-07-04 PROCEDURE — 80048 BASIC METABOLIC PNL TOTAL CA: CPT | Performed by: INTERNAL MEDICINE

## 2018-07-04 PROCEDURE — 74011250636 HC RX REV CODE- 250/636: Performed by: INTERNAL MEDICINE

## 2018-07-04 PROCEDURE — 85025 COMPLETE CBC W/AUTO DIFF WBC: CPT | Performed by: INTERNAL MEDICINE

## 2018-07-04 PROCEDURE — 74011250637 HC RX REV CODE- 250/637: Performed by: INTERNAL MEDICINE

## 2018-07-04 PROCEDURE — 65660000000 HC RM CCU STEPDOWN

## 2018-07-04 PROCEDURE — 36415 COLL VENOUS BLD VENIPUNCTURE: CPT | Performed by: HOSPITALIST

## 2018-07-04 PROCEDURE — 74011636637 HC RX REV CODE- 636/637: Performed by: HOSPITALIST

## 2018-07-04 RX ORDER — AMOXICILLIN AND CLAVULANATE POTASSIUM 875; 125 MG/1; MG/1
1 TABLET, FILM COATED ORAL EVERY 12 HOURS
Status: DISCONTINUED | OUTPATIENT
Start: 2018-07-04 | End: 2018-07-07 | Stop reason: HOSPADM

## 2018-07-04 RX ORDER — PREDNISONE 20 MG/1
40 TABLET ORAL
Status: DISCONTINUED | OUTPATIENT
Start: 2018-07-04 | End: 2018-07-06

## 2018-07-04 RX ORDER — ENOXAPARIN SODIUM 100 MG/ML
1 INJECTION SUBCUTANEOUS ONCE
Status: DISCONTINUED | OUTPATIENT
Start: 2018-07-04 | End: 2018-07-04

## 2018-07-04 RX ORDER — DOXYCYCLINE HYCLATE 100 MG
100 TABLET ORAL EVERY 12 HOURS
Status: DISCONTINUED | OUTPATIENT
Start: 2018-07-04 | End: 2018-07-07 | Stop reason: HOSPADM

## 2018-07-04 RX ORDER — HEPARIN SODIUM 5000 [USP'U]/ML
5000 INJECTION, SOLUTION INTRAVENOUS; SUBCUTANEOUS EVERY 8 HOURS
Status: DISCONTINUED | OUTPATIENT
Start: 2018-07-04 | End: 2018-07-05 | Stop reason: SDUPTHER

## 2018-07-04 RX ADMIN — DOXYCYCLINE HYCLATE 100 MG: 100 TABLET, COATED ORAL at 11:01

## 2018-07-04 RX ADMIN — HEPARIN SODIUM 5000 UNITS: 5000 INJECTION, SOLUTION INTRAVENOUS; SUBCUTANEOUS at 17:58

## 2018-07-04 RX ADMIN — MUPIROCIN: 20 OINTMENT TOPICAL at 08:04

## 2018-07-04 RX ADMIN — VANCOMYCIN HYDROCHLORIDE 1250 MG: 10 INJECTION, POWDER, LYOPHILIZED, FOR SOLUTION INTRAVENOUS at 00:38

## 2018-07-04 RX ADMIN — ASPIRIN 81 MG 81 MG: 81 TABLET ORAL at 08:02

## 2018-07-04 RX ADMIN — HEPARIN SODIUM 5000 UNITS: 5000 INJECTION, SOLUTION INTRAVENOUS; SUBCUTANEOUS at 03:38

## 2018-07-04 RX ADMIN — AMOXICILLIN AND CLAVULANATE POTASSIUM 1 TABLET: 875; 125 TABLET, FILM COATED ORAL at 21:56

## 2018-07-04 RX ADMIN — DOXYCYCLINE HYCLATE 100 MG: 100 TABLET, COATED ORAL at 21:57

## 2018-07-04 RX ADMIN — FOLIC ACID 1 MG: 1 TABLET ORAL at 08:02

## 2018-07-04 RX ADMIN — Medication 10 ML: at 21:57

## 2018-07-04 RX ADMIN — MUPIROCIN: 20 OINTMENT TOPICAL at 21:57

## 2018-07-04 RX ADMIN — AMOXICILLIN AND CLAVULANATE POTASSIUM 1 TABLET: 875; 125 TABLET, FILM COATED ORAL at 11:01

## 2018-07-04 RX ADMIN — PREDNISONE 40 MG: 20 TABLET ORAL at 11:01

## 2018-07-04 RX ADMIN — DILTIAZEM HYDROCHLORIDE 180 MG: 180 CAPSULE, COATED, EXTENDED RELEASE ORAL at 08:02

## 2018-07-04 RX ADMIN — Medication 100 MG: at 08:02

## 2018-07-04 RX ADMIN — CEFEPIME HYDROCHLORIDE 2 G: 2 INJECTION, POWDER, FOR SOLUTION INTRAVENOUS at 05:33

## 2018-07-04 RX ADMIN — Medication 10 ML: at 05:33

## 2018-07-04 RX ADMIN — Medication 10 ML: at 00:38

## 2018-07-04 RX ADMIN — Medication 10 ML: at 14:00

## 2018-07-04 RX ADMIN — THERA TABS 1 TABLET: TAB at 08:02

## 2018-07-04 RX ADMIN — COLCHICINE 0.6 MG: 0.6 TABLET, FILM COATED ORAL at 08:02

## 2018-07-04 RX ADMIN — HEPARIN SODIUM 5000 UNITS: 5000 INJECTION, SOLUTION INTRAVENOUS; SUBCUTANEOUS at 11:01

## 2018-07-04 NOTE — CONSULTS
[facility] INTENSIVIST Consult Service Note  Pulmonary, Critical Care, and Sleep Medicine    Name: Elizabeth Stephens Sr. MRN: [id number]   : 1950 Hospital: [address]   Date: 2018   Hospital Day: 4       Subjective/Interval History:   Last 24 hrs: Has some dyspnea with exertion. NO chest pain, no back pain. NO abdominal pain. Had some mild coughing. NO sinus pain or pressure. NO leg pain. Slept ok last pm.             Patient Active Problem List   Diagnosis Code    Severe sepsis ([facility] [address] A41.9, R65.20       IMPRESSION:   1. Severe sepsis with septic shock, resolved. 2. Atrial flutter  / Tachycardia now slower-Followed by Dr. Tata Matthews. 3. Possible COPD - due to 1/5 ppd smoking for over 40 years and clinical examination highly suggestive of COPD without acute exacerbation, Will need outpt follow up with me and PFTs. 4. Has normal LVEF and mildly decreased RVEF?   5. Acute uncontrolled Tophaceous gout  6. Hx of squamous cell CA on various parts of his body s/p blue light Rx.   7. Heavy Tobacco abuse history   8. Heavy ETOH abuse history - One pint of whiskey every two days- at high risk for withdrawal  9. Multiorgan dysfunction as outlined above: Pt has one or more acute or chronic illnesses with severe exacerbation with progression or side effects of treatment that poses a threat to life or bodily function      RECOMMENDATIONS/PLAN:   1. Will need to follow up with me upon discharge. Ph: [phone] for appt. 2. Will need outpt PFTs  3. Can add breo 100 mcg, one inhalation daily at discharge. 4. Colchicine- Insurance would not cover this  5. Wound care consult  6. So far Cx are negative. 7. Monitor for withdrawal, so far negative. 8. Smoking cessation counseling  9. Bronchial hygiene with respiratory therapy techniques, bronchodilators  10.  Pt needs IV fluids with additives and Drug therapy requiring intensive monitoring for toxicity  11. Prescription drug management with home med reconciliation reviewed  12. DVT, SUP prophylaxis  13. Will be available to assist in medical management while in the CCU pending disposition     My assessment/management discussed with: Nursing, Pharmacy, Case Management, PT, OT, Respiratory Therapy, Hospitalist and Family for coordination of care      Subjective/Initial History:   I have reviewed the flowsheet and previous days notes. Seen earlier today on rounds. I was asked by Arcadio Young MD to see Bharti Mouralorne Rico. a 76 y.o.  male  in consultation for a chief complaint of sepsis with shock. Excerpts from admission and consult notes reviewed as follows: \"The pt was 'fine ' about two weeks ago. He developed a cough with some feverish episodes for which his PCP prescribed steroids PO, PO doxycycline. The pt states that after taking those medicines for three to four days he developed tremulousness and felt worse. Therefore, he stopped taking those medicines. He continued to feel unwell. During this time he continues with his work and at work about a week ago he felt lightheaded and noticed blind spots and he was unable to move due to acute weakness. He took time off ( I believe) and spent resting at home last few days. He didn't listen to his wife's request and didn't come to ER when he felt unwell last Monday. This morning he was brought to ER and since then workup has revealed above findings. 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 \"    Frustrated because Reim Haas trying to treat gout but insurance company fails to cover any of the meds. Punctured tophae on knuckles. Weaned off pressors. PMH:  has a past medical history of Ill-defined condition. PSH:   has no past surgical history on file. FHX: family history is not on file.    SHX:  reports that he has been smoking. He has been smoking about 1.50 packs per day. He has quit using smokeless tobacco. He reports that he drinks about 6.0 oz of alcohol per week  He reports that he does not use illicit drugs. ROS:A comprehensive review of systems was negative except for that written in the HPI.       No Known Allergies Medications:      MEDS:   Current Facility-Administered Medications   Medication    vancomycin (VANCOCIN) 1,000 mg in 0.9% sodium chloride (MBP/ADV) 250 mL    aspirin chewable tablet 81 mg    dilTIAZem CD (CARDIZEM CD) capsule 180 mg    chlordiazePOXIDE (LIBRIUM) capsule 5 mg    colchicine tablet 0.6 mg    Thiamine Mononitrate (B-1) tablet 752 mg    folic acid (FOLVITE) tablet 1 mg    therapeutic multivitamin (THERAGRAN) tablet 1 Tab    sodium chloride (NS) flush 5-10 mL    sodium chloride (NS) flush 5-10 mL    acetaminophen (TYLENOL) tablet 500 mg    HYDROcodone-acetaminophen (NORCO) 5-325 mg per tablet 1 Tab    naloxone (NARCAN) injection 0.4 mg    ondansetron (ZOFRAN) injection 2 mg    bisacodyl (DULCOLAX) tablet 5 mg    nicotine (NICODERM CQ) 7 mg/24 hr patch 1 Patch    heparin (porcine) injection 5,000 Units    cefepime (MAXIPIME) 2 g in 0.9% sodium chloride (MBP/ADV) 100 mL    mupirocin (BACTROBAN) 2 % ointment        Current Facility-Administered Medications:     vancomycin (VANCOCIN) 1,000 mg in 0.9% sodium chloride (MBP/ADV) 250 mL, 1,000 mg, IntraVENous, Q12H, Fiorella Le MD    aspirin chewable tablet 81 mg, 81 mg, Oral, DAILY, Clarice Santiago MD, 81 mg at 07/04/18 0802    dilTIAZem CD (CARDIZEM CD) capsule 180 mg, 180 mg, Oral, DAILY, Clarice Santiago MD, 180 mg at 07/04/18 0802    chlordiazePOXIDE (LIBRIUM) capsule 5 mg, 5 mg, Oral, QID PRN, Pako Vicente MD    colchicine tablet 0.6 mg, 0.6 mg, Oral, DAILY, Pako Vicente MD, 0.6 mg at 07/04/18 0802    Thiamine Mononitrate (B-1) tablet 100 mg, 100 mg, Oral, DAILY, Pako Vicente MD, 100 mg at 07/04/18 4970    folic acid (FOLVITE) tablet 1 mg, 1 mg, Oral, DAILY, Oscar Boyer MD, 1 mg at 18 08    therapeutic multivitamin (THERAGRAN) tablet 1 Tab, 1 Tab, Oral, DAILY, Oscar Boyer MD, 1 Tab at 18 08    sodium chloride (NS) flush 5-10 mL, 5-10 mL, IntraVENous, Q8H, Man Beard MD, 10 mL at 18 05    sodium chloride (NS) flush 5-10 mL, 5-10 mL, IntraVENous, PRN, Man Beard MD    acetaminophen (TYLENOL) tablet 500 mg, 500 mg, Oral, Q4H PRN, Man Beard MD    HYDROcodone-acetaminophen (NORCO) 5-325 mg per tablet 1 Tab, 1 Tab, Oral, Q6H PRN, Man Beard MD    [facility]) injection 0.4 mg, 0.4 mg, IntraVENous, PRN, Man Beard MD    ondansetron [facility]) injection 2 mg, 2 mg, IntraVENous, Q6H PRN, Man Beard MD    bisacodyl (DULCOLAX) tablet 5 mg, 5 mg, Oral, DAILY PRN, Man Beard MD    nicotine (NICODERM CQ) 7 mg/24 hr patch 1 Patch, 1 Patch, TransDERmal, DAILY PRN, Man Beard MD    heparin (porcine) injection 5,000 Units, 5,000 Units, SubCUTAneous, Q8H, Man Beard MD, 5,000 Units at 18 9633    cefepime (MAXIPIME) 2 g in 0.9% sodium chloride (MBP/ADV) 100 mL, 2 g, IntraVENous, Q12H, Man Beard MD, Last Rate: 200 mL/hr at 18, 2 g at 18 05    mupirocin (BACTROBAN) 2 % ointment, , Both Nostrils, Q12H, Santhosh Mccarthy MD      Objective:     Vital Signs: Telemetry:    normal sinus rhythm Intake/Output:   Visit Vitals    /71 (BP 1 Location: Right arm, BP Patient Position: At rest)    Pulse 88    Temp 97.6 °F (36.4 °C)    Resp 18    Ht 5' 9\" (1.753 m)    Wt 86.2 kg (190 lb)    SpO2 96%    BMI 28.06 kg/m2       Temp (24hrs), Av.8 °F (36.6 °C), Min:97.3 °F (36.3 °C), Max:98.1 °F (36.7 °C)        O2 Device: Room air       Body mass index is 28.06 kg/(m^2).     Wt Readings from Last 4 Encounters:   18 86.2 kg (190 lb)   18 82.1 kg (181 lb)   10/21/17 82.1 kg (181 lb)          Intake/Output Summary (Last 24 hours) at 07/04/18 0940  Last data filed at 07/03/18 2020   Gross per 24 hour   Intake              780 ml   Output                0 ml   Net              780 ml       Last shift:         Last 3 shifts: 07/02 1901 - 07/04 0700  In: 1020 [P.O.:720; I.V.:300]  Out: -          Physical Exam:     General:  male; in no respiratory distress and acyanotic; room air;   HEAD: Normocephalic, without obvious abnormality, atraumatic   EYES: conjunctivae clear. PERRL,  AN Icteric sclerae   NOSE: nares normal, no drainage, no nasal flaring,    THROAT: Lips, mucosa dry; No Thrush; class 3 airway;  tongue midline   Neck: Supple, symmetrical, trachea midline,  No accessory mm use; No Stridor/ cuff leak, No goiter or thyroid tenderness   LYMPH: No abnormally enlarged lymph nodes. in neck or groin   Chest: normal   Lungs: decreased air exchange bibasilarly. No wheezing, no rhonchi. Breathing comfortably. Heart: Regular rate and rhythm; nl s1,2. NO MRG. Abdomen: soft, nontender   : Hayes; clear urine; NO gross hematuria   Extremity: negative, clubbing, multiple joint deformities from tophaceous gout; joint swelling and erythema   Neuro: alert; speech fluent withdraws to pain; unable to check gait and station; does follow simple commands   Psych: oriented to time, place and person ; mild agitation;    Skin: Warm;    Pulses:Bilateral, Radial, 2+   Capillary refill: normal; well perfused, Neuro: intact. Good strength of BUE and BLE. Psych: no anxiety or depression      Data:         All lab results for the last 24 hours reviewed.           Labs:    Recent Labs      07/04/18   0036  07/03/18   0139  07/02/18   0452  07/01/18   1822  07/01/18   1123   WBC  8.0  9.1  9.6   --   14.4*   HGB  9.6*  10.3*  10.4*   --   12.9   PLT  149*  150  162   --   223   INR   --    --    --   1.1  1.0     Recent Labs      07/04/18   0036  07/03/18   0139  07/02/18   0452  07/01/18   1822  07/01/18   1123   NA  144  140  143   --   140   K  4.3 4.9  5.5*   --   5.9*   CL  114*  113*  116*   --   110*   CO2  20*  19*  19*   --   20*   GLU  84  152*  113*   --   83   BUN  29*  33*  40*   --   60*   CREA  1.26  1.35*  1.57*   --   2.21*   CA  8.4*  8.3*  8.0*   --   8.9   MG   --    --    --   1.7  2.0   LAC   --    --    --   0.9   --    ALB   --    --    --    --   3.1*   SGOT   --    --    --    --   23   ALT   --    --    --    --   46     Recent Labs      07/01/18   1650   PH  7.38   PCO2  29*   PO2  87   HCO3  17*     Recent Labs      07/01/18   1123   CPK  30*   CKNDX  3.7*   TROIQ  <0.05     No results found for: BNPP, BNP   Lab Results   Component Value Date/Time    Culture result: MRSA NOT PRESENT 07/01/2018 08:51 PM    Culture result:  07/01/2018 08:51 PM         Screening of patient nares for MRSA is for surveillance purposes and, if positive, to facilitate isolation considerations in high risk settings. It is not intended for automatic decolonization interventions per se as regimens are not sufficiently effective to warrant routine use. Culture result: NO GROWTH 3 DAYS 07/01/2018 11:23 AM     Lab Results   Component Value Date/Time    Vancomycin,trough 13.9 (H) 07/04/2018 12:36 AM    CK 30 (L) 07/01/2018 11:23 AM         Imaging:  I have personally reviewed the patients radiographs and have reviewed the reports:          Results from Hospital Encounter encounter on 07/01/18   XR CHEST PORT   Narrative EXAM:  XR CHEST PORT    INDICATION:  cvl PLACEMENT    COMPARISON:  7/1/2018    FINDINGS: A portable AP radiograph of the chest was obtained at 1619 hours. Right IJ catheter tip overlies SVC. There is no pneumothorax. Visualized lungs  are clear. . . Heart size is stable. .          Impression IMPRESSION: Right IJ catheter tip overlies SVC. There is no pneumothorax. No results found for this or any previous visit.     This care involved high complexity decision making which includes independently reviewing the patient's past medical records, current laboratory results, medication profiles that were immediately available to me and actual Xray images at the bedside in order to assess, support vital system function, and to treat this degree of vital organ system failure, and to prevent further deterioration of the patients condition. Medical Decision Making Today:  · Reviewed the flowsheet and previous days notes  · Reviewed and summarized records or history from previous days note or discussions with staff, family  · Parenteral controlled substances - Reviewed/ Adjusted / Sheila McColl / Started  · High Risk Drug therapy requiring intensive monitoring for toxicity: eg steroids, pressors, antibiotics  · MAR reviewed and pertinent medications noted or modified as needed  · Reviewed and/or ordered Clinical lab tests  · Reviewed and/or ordered Radiology tests  · Reviewed and/or ordered of Medicine tests  · Independently visualized radiologic Images  · I have personally reviewed the patients ECG / Telemetry  ·          Thank you for allowing us to participate in the care of this patient. We will be happy to follow along with you.     Melissa Castillo MD

## 2018-07-04 NOTE — ROUTINE PROCESS
Bedside shift change report given to Pham Khan RN (oncoming nurse) by Micaela Smith RN (offgoing nurse). Report included the following information SBAR, Kardex, Intake/Output, MAR, Accordion, Recent Results, Med Rec Status, Cardiac Rhythm a flutter and Alarm Parameters    .

## 2018-07-04 NOTE — PROGRESS NOTES
Progress Note      7/4/2018   NAME: Alan Judge Sr. MRN:  [id number]   Admit Diagnosis: Severe sepsis [facility])     Assessment:     1. Newly-diagnosed persistent atrial flutter with typical RA morphology on ECG. Rate-controlled. ChadsVasc score 1.  2.  Sepsis. Improved. 3.  PATRICK, improving. Hyperkalemia, resolved. 4.  Chronic tobacco abuse. 4.  COPD. Mildly-dilated RV with mild hypokinesis on echo here. 5.  Tophaceous gout, with acute exacerbation. 6.  Alcohol use. Hepatic parenchymal disease suggested by ultrasound here. 7.  Anemia. Plan:     1. Stop aspirin for now. 2.  DVT prophylactic heparin OK. 3.  Continue diltiazem for rate control. Rate control and antiplatelet strategy underway, I discussed the potential benefits, risks, and alternatives to RENA-atrial flutter ablation as well as cardioversion with antiarrhythmic therapy. He does not have a high thromboembolic risk fortunately, not a good chronic anticoagulation candidate. Patient and wife prefer to cure this now if feasible rather than track this longterm, take chronic NSAID or anticoagulation. Will make NPO after MN in case an intervention will be pursued. []        High complexity decision making was performed    Subjective:     Alan Judge Sr. denies chest pain, dyspnea. Walked today, some dyspnea on exertion. Discussed with RN events overnight. Patient Active Problem List   Diagnosis Code    Severe sepsis ([facility] [address] A41.9, R65.20       Review of Systems:    Symptom Y/N Comments  Symptom Y/N Comments   Fever/Chills N   Chest Pain N    Poor Appetite N   Edema N    Cough N   Abdominal Pain N    Sputum N   Joint Pain Y    SOB/DANIELS Y   Pruritis/Rash N    Nausea/vomit N   Tolerating PT/OT Y    Diarrhea N   Tolerating Diet Y    Constipation N   Other       Could NOT obtain due to:      Objective:      Physical Exam:    Last 24hrs VS reviewed since prior progress note.  Most recent are:    Visit Vitals    /65 (BP 1 Location: Left arm, BP Patient Position: At rest)    Pulse 92    Temp 98.4 °F (36.9 °C)    Resp 16    Ht 5' 9\" (1.753 m)    Wt 86.1 kg (189 lb 12.8 oz)    SpO2 96%    BMI 28.03 kg/m2       Intake/Output Summary (Last 24 hours) at 07/04/18 1655  Last data filed at 07/04/18 1228   Gross per 24 hour   Intake              780 ml   Output              200 ml   Net              580 ml        General Appearance: Well developed, well nourished, alert & oriented x3, no acute distress. Ears/Nose/Mouth/Throat: Hearing grossly normal.  No abnormalities. Neck:  Supple, nontender. Chest: Lungs clear to auscultation bilaterally. Unlabored. Symmetric air movement. Cardiovascular: Irregular rate and rhythm, S1S2 normal, no murmur. Abdomen: Soft, non-tender, bowel sounds are active. Extremities: No edema bilaterally. No cyanosis or clubbing.  +tophi. Skin: Warm and dry. No jaundice, petechiae, or purpura. Neuro:  Mild resting tremor in his hands. Awake, appropriate. Grossly nonfocal.    PMH/SH reviewed - no change compared to H&P    Data Review    Telemetry:  Atrial flutter with variable block. Lab Data Personally Reviewed:    Recent Labs      07/04/18   0036  07/03/18 0139   WBC  8.0  9.1   HGB  9.6*  10.3*   HCT  27.8*  29.2*   PLT  149*  150     Recent Labs      07/04/18   0036  07/03/18   0139  07/02/18   0452  07/01/18   1822   NA  144  140  143   --    K  4.3  4.9  5.5*   --    CL  114*  113*  116*   --    CO2  20*  19*  19*   --    BUN  29*  33*  40*   --    CREA  1.26  1.35*  1.57*   --    GLU  84  152*  113*   --    CA  8.4*  8.3*  8.0*   --    MG   --    --    --   1.7     No results for input(s): PH, PCO2, PO2 in the last 72 hours.     Medications Personally Reviewed:    Current Facility-Administered Medications   Medication Dose Route Frequency    doxycycline (VIBRA-TABS) tablet 100 mg  100 mg Oral Q12H    amoxicillin-clavulanate (AUGMENTIN) 875-125 mg per tablet 1 Tab  1 Tab Oral Q12H    predniSONE (DELTASONE) tablet 40 mg  40 mg Oral DAILY WITH BREAKFAST    heparin (porcine) injection 5,000 Units  5,000 Units SubCUTAneous Q8H    dilTIAZem CD (CARDIZEM CD) capsule 180 mg  180 mg Oral DAILY    chlordiazePOXIDE (LIBRIUM) capsule 5 mg  5 mg Oral QID PRN    colchicine tablet 0.6 mg  0.6 mg Oral DAILY    Thiamine Mononitrate (B-1) tablet 100 mg  100 mg Oral DAILY    folic acid (FOLVITE) tablet 1 mg  1 mg Oral DAILY    therapeutic multivitamin (THERAGRAN) tablet 1 Tab  1 Tab Oral DAILY    sodium chloride (NS) flush 5-10 mL  5-10 mL IntraVENous Q8H    sodium chloride (NS) flush 5-10 mL  5-10 mL IntraVENous PRN    acetaminophen (TYLENOL) tablet 500 mg  500 mg Oral Q4H PRN    HYDROcodone-acetaminophen (NORCO) 5-325 mg per tablet 1 Tab  1 Tab Oral Q6H PRN    naloxone (NARCAN) injection 0.4 mg  0.4 mg IntraVENous PRN    ondansetron (ZOFRAN) injection 2 mg  2 mg IntraVENous Q6H PRN    bisacodyl (DULCOLAX) tablet 5 mg  5 mg Oral DAILY PRN    nicotine (NICODERM CQ) 7 mg/24 hr patch 1 Patch  1 Patch TransDERmal DAILY PRN    mupirocin (BACTROBAN) 2 % ointment   Both Nostrils Q12H         Abdi Pride MD

## 2018-07-04 NOTE — ROUTINE PROCESS
Bedside shift change report received. Assumed care of pt  Report included the following information SBAR, Kardex, Intake/Output, MAR, Recent Results, Med Rec Status and Cardiac Rhythm A-flutter.

## 2018-07-04 NOTE — PROGRESS NOTES
Bedside and Verbal shift change report received from Alessandro Monroy. Report included the following information SBAR, Kardex and Recent Results.

## 2018-07-04 NOTE — PROGRESS NOTES
Pharmacy Automatic Renal Dosing Protocol - Antimicrobials    Indication for Antimicrobials: possible cellulitis, ?sepsis  Per MD note \"Per the patient, he has left elbow boggy swelling since couple of years, get infected  Needing antibiotics couple of times\"    Current Regimen of Each Antimicrobial:  Vancomycin 1250 mg IV every 18 hours (Start Date ; Day # 4  Cefepime 2 g IV every 12 hours (Start Date ; Day # 4    Previous Antimicrobial Therapy:  Levofloxacin     Goal Level: VANCOMYCIN TROUGH GOAL RANGE  Vancomycin Trough: 15 - 20 mcg/mL    Date Dose & Interval Measured (mcg/mL) Extrapolated (mcg/mL)   18 1250 mg q 18 h 13.9 11.68                 Significant Cultures:    Blood: NGTD - pending   MRSA: negative    Paralysis, amputations, malnutrition: None documented    Labs:  Recent Labs      18   0036  18   0139  18   0452   CREA  1.26  1.35*  1.57*   BUN  29*  33*  40*   WBC  8.0  9.1  9.6     Temp (24hrs), Av.8 °F (36.6 °C), Min:97.3 °F (36.3 °C), Max:98.1 °F (36.7 °C)    Creatinine Clearance (mL/min) or Dialysis: >50 mL/min    Impression/Plan:   · SCr is continuing to improve. · Based on the vancomycin trough, will adjust the vancomycin to 1,000 mg q 12 hours and this will yield an estimated trough of 17 mcg/ml. · Will continue current regimen of cefepime  · Blood cx NG and please consider deescalating the abx. · Antimicrobial stop date TBD     Pharmacy will follow daily and adjust medications as appropriate for renal function and/or serum levels. Thank you,  Lnio Burton, PHARMD    Recommended duration of therapy  [email or web address]. docx    Renal Dosing  [email or web address]/Pharmacy/[email or web address]/Renal%[id number]. pdf

## 2018-07-04 NOTE — PROGRESS NOTES
Bedside and Verbal shift change report given to [address] [place] [address] (oncoming nurse) by Adolfo Lynn (offgoing nurse). Report included the following information SBAR, Kardex and Recent Results.

## 2018-07-04 NOTE — CONSULTS
Admitted with sepsis. Was in the acute phase of a gout attack. Redness and pain have resolved. Some concern this morning from family over some right index finger drainage of clear fluid. Patient Vitals for the past 24 hrs:   Temp Pulse Resp BP SpO2   07/04/18 1109 97.9 °F (36.6 °C) 100 18 138/63 97 %   07/04/18 0716 97.6 °F (36.4 °C) 88 18 105/71 96 %   07/04/18 0319 98 °F (36.7 °C) 87 18 95/56 99 %   07/03/18 2310 98.1 °F (36.7 °C) 88 18 101/60 96 %   07/03/18 2010 98 °F (36.7 °C) 91 18 101/59 96 %   07/03/18 1504 97.3 °F (36.3 °C) 91 18 129/63 97 %     Tophus changes in the fingers both hands  nt at this time. No erythema or fevers over the joints    Continue current treatment  Pt/ot for ROM  Will follow     ORTHO CONSULT    Subjective:     Date of Consultation:  July 5, 2018          Patient Active Problem List    Diagnosis Date Noted    Severe sepsis ([facility] [address] 07/01/2018     History reviewed. No pertinent family history. Social History   Substance Use Topics    Smoking status: Current Every Day Smoker     Packs/day: 1.50    Smokeless tobacco: Former User    Alcohol use 6.0 oz/week     10 Shots of liquor per week     Past Medical History:   Diagnosis Date    Ill-defined condition     gout      History reviewed. No pertinent surgical history. Prior to Admission medications    Medication Sig Start Date End Date Taking? Authorizing Provider   albuterol (PROAIR HFA) 90 mcg/actuation inhaler Take 2 Puffs by inhalation. Yes Javy Dove MD   lisinopril (PRINIVIL, ZESTRIL) 10 mg tablet Take 10 mg by mouth daily. Yes Javy Dove MD   raNITIdine (ZANTAC) 300 mg tablet Take 300 mg by mouth daily. Yes Javy Dove MD   colchicine (COLCRYS) 0.6 mg tablet Take 0.6 mg by mouth daily.    Yes Javy Dove MD     Current Facility-Administered Medications   Medication Dose Route Frequency    doxycycline (VIBRA-TABS) tablet 100 mg  100 mg Oral Q12H    amoxicillin-clavulanate (AUGMENTIN) 875-125 mg per tablet 1 Tab  1 Tab Oral Q12H    predniSONE (DELTASONE) tablet 40 mg  40 mg Oral DAILY WITH BREAKFAST    heparin (porcine) injection 5,000 Units  5,000 Units SubCUTAneous Q8H    dilTIAZem CD (CARDIZEM CD) capsule 180 mg  180 mg Oral DAILY    chlordiazePOXIDE (LIBRIUM) capsule 5 mg  5 mg Oral QID PRN    colchicine tablet 0.6 mg  0.6 mg Oral DAILY    Thiamine Mononitrate (B-1) tablet 100 mg  100 mg Oral DAILY    folic acid (FOLVITE) tablet 1 mg  1 mg Oral DAILY    therapeutic multivitamin (THERAGRAN) tablet 1 Tab  1 Tab Oral DAILY    sodium chloride (NS) flush 5-10 mL  5-10 mL IntraVENous Q8H    sodium chloride (NS) flush 5-10 mL  5-10 mL IntraVENous PRN    acetaminophen (TYLENOL) tablet 500 mg  500 mg Oral Q4H PRN    HYDROcodone-acetaminophen (NORCO) 5-325 mg per tablet 1 Tab  1 Tab Oral Q6H PRN    naloxone (NARCAN) injection 0.4 mg  0.4 mg IntraVENous PRN    ondansetron (ZOFRAN) injection 2 mg  2 mg IntraVENous Q6H PRN    bisacodyl (DULCOLAX) tablet 5 mg  5 mg Oral DAILY PRN    nicotine (NICODERM CQ) 7 mg/24 hr patch 1 Patch  1 Patch TransDERmal DAILY PRN    mupirocin (BACTROBAN) 2 % ointment   Both Nostrils Q12H     No Known Allergies     Review of Systems:  Pertinent items are noted in HPI. Objective:     Patient Vitals for the past 8 hrs:   BP Temp Pulse Resp SpO2 Weight   18 0533 115/75 98.3 °F (36.8 °C) 87 20 99 % 85.9 kg (189 lb 7 oz)   18 0015 103/63 98.2 °F (36.8 °C) 87 20 97 % -     Temp (24hrs), Av.3 °F (36.8 °C), Min:97.9 °F (36.6 °C), Max:98.6 °F (37 °C)      Physical Exam:  General:     Alert and oriented. No acute distress. Chest:     Respirations unlabored. Abdomen:     Extremities:   Soft, non-tender. No evidence of cyanosis. Pulses palpable in both upper and lower extremities. Donna's sign negative in bilateral lower extremities. Moving all extremities. Small tophi nodules on right index and left thumb. No erythema or drainage       Neurologic:  No motor deficits. Sensation stable. Neurovascular exam within normal limits.                  Data Review   Recent Results (from the past 24 hour(s))   METABOLIC PANEL, BASIC    Collection Time: 07/05/18  3:15 AM   Result Value Ref Range    Sodium 143 136 - 145 mmol/L    Potassium 4.3 3.5 - 5.1 mmol/L    Chloride 112 (H) 97 - 108 mmol/L    CO2 22 21 - 32 mmol/L    Anion gap 9 5 - 15 mmol/L    Glucose 131 (H) 65 - 100 mg/dL    BUN 23 (H) 6 - 20 MG/DL    Creatinine 1.09 0.70 - 1.30 MG/DL    BUN/Creatinine ratio 21 (H) 12 - 20      GFR est AA >60 >60 ml/min/1.73m2    GFR est non-AA >60 >60 ml/min/1.73m2    Calcium 8.2 (L) 8.5 - 10.1 MG/DL         Assessment/Plan:     77 yo with gout, tophi of fingers    -improving with steroids  -no acute ortho intervention necessary  -discussed possibility of following up with hand specialist (jaja or bertram) as outpatient if necessary for tophi of hands [phone]      Barbara Milian,

## 2018-07-04 NOTE — PROGRESS NOTES
Hospitalist Progress Note    NAME: Bharti Ramachandran Sr.   :  1950   MRN:  [id number]       Assessment / Plan:  Severe sepsis with shock ( unclear etiology) on admission   Probably superinfection with tophaceous gout  Acute uncontrolled tophaceous gout     Needing pressors and solucortef on admission  solucortef stopped in the ICU on 7/3  Blood cultures on  so far neg  Chest xray neg  Started on empiric vanco and cefepime ( d4) change to po doxy and augmentin to complete the total 7-10 days  On colchicine  Wound care consult pending    Pt today with right index finger tophacous joint with mild redness and some drainage  Ortho eval  To get further recommendations  Pt has allopurinol at home, plan was to start once over with acute attack. Per the patient, he has left elbow boggy swelling since couple of years, get infected  Needing antibiotics couple of times    Hx of squamous cell CA on various parts of his body s/p blue light Rx. Heavy Tobacco abuse history   Heavy ETOH abuse history - One pint of whiskey every two days- at high risk for withdrawal    ? Copd with smoking history  Needs PFT as out patient  Follow up with Dr. Rody Peña as out patient  + hepatic steatosis, encouraged alcohol cessation, verbalized understanding  Aflutter  Echo with ef 55-60% no wall motion abnormalities  Seen by the cardiology this admission  On asa, diltiazem      haroon  On ? ckd cr 2.2  Improving, will monitor  Follow the renal sono with renal cyst ( need follow up as out patient)        tsh borderline high, which was check when he is critically ill, would repeat in next couple of weeks     Body mass index is 28.06 kg/(m^2). over weight    Code status: full  Prophylaxis: heparin  Recommended Disposition: tbd     Subjective:     Chief Complaint / Reason for Physician Visit  Right index finger with redness and rainage    Discussed with RN events overnight.      Review of Systems:  Symptom Y/N Comments  Symptom Y/N Comments Fever/Chills n   Chest Pain n    Poor Appetite    Edema     Cough n   Abdominal Pain n    Sputum    Joint Pain     SOB/DANIELS    Pruritis/Rash     Nausea/vomit    Tolerating PT/OT     Diarrhea    Tolerating Diet     Constipation    Other       Could NOT obtain due to:      Objective:     VITALS:   Last 24hrs VS reviewed since prior progress note. Most recent are:  Patient Vitals for the past 24 hrs:   Temp Pulse Resp BP SpO2   07/04/18 1109 97.9 °F (36.6 °C) 100 18 138/63 97 %   07/04/18 0716 97.6 °F (36.4 °C) 88 18 105/71 96 %   07/04/18 0319 98 °F (36.7 °C) 87 18 95/56 99 %   07/03/18 2310 98.1 °F (36.7 °C) 88 18 101/60 96 %   07/03/18 2010 98 °F (36.7 °C) 91 18 101/59 96 %   07/03/18 1504 97.3 °F (36.3 °C) 91 18 129/63 97 %       Intake/Output Summary (Last 24 hours) at 07/04/18 1413  Last data filed at 07/04/18 1228   Gross per 24 hour   Intake              780 ml   Output              200 ml   Net              580 ml        PHYSICAL EXAM:  General:  Alert,  cooperative, no acute distress    EENT:  EOMI. Anicteric sclerae. MMM  Resp:  B/l decreased air entry, no crackles. No accessory muscle use  CV:  Regular  rhythm,  No edema  GI:  Soft, Non distended, Non tender.  +Bowel sounds  Neurologic:  Alert and oriented X 3, normal speech,   Psych:   Good insight. Not anxious nor agitated  Skin:  No rashes.   No jaundice, right 2 nd finger tophi,with redness and drainage left elbow boggy swelling no tenderness  Reviewed most current lab test results and cultures  YES  Reviewed most current radiology test results   YES  Review and summation of old records today    NO  Reviewed patient's current orders and MAR    YES  PMH/SH reviewed - no change compared to H&P  ________________________________________________________________________  Care Plan discussed with:    Comments   Patient y    Family      RN y    Care Manager     Consultant                        Multidiciplinary team rounds were held today with , nursing, pharmacist and clinical coordinator. Patient's plan of care was discussed; medications were reviewed and discharge planning was addressed. ________________________________________________________________________  Total NON critical care TIME: 25  Minutes    Total CRITICAL CARE TIME Spent:   Minutes non procedure based      Comments   >50% of visit spent in counseling and coordination of care     ________________________________________________________________________  Sage Martínez MD     Procedures: see electronic medical records for all procedures/Xrays and details which were not copied into this note but were reviewed prior to creation of Plan. LABS:  I reviewed today's most current labs and imaging studies.   Pertinent labs include:  Recent Labs      07/04/18   0036  07/03/18   0139  07/02/18   0452   WBC  8.0  9.1  9.6   HGB  9.6*  10.3*  10.4*   HCT  27.8*  29.2*  29.3*   PLT  149*  150  162     Recent Labs      07/04/18   0036  07/03/18   0139  07/02/18   0452  07/01/18   1822   NA  144  140  143   --    K  4.3  4.9  5.5*   --    CL  114*  113*  116*   --    CO2  20*  19*  19*   --    GLU  84  152*  113*   --    BUN  29*  33*  40*   --    CREA  1.26  1.35*  1.57*   --    CA  8.4*  8.3*  8.0*   --    MG   --    --    --   1.7   INR   --    --    --   1.1       Signed: Sage Martínez MD

## 2018-07-05 ENCOUNTER — ANESTHESIA (OUTPATIENT)
Dept: NON INVASIVE DIAGNOSTICS | Age: 68
DRG: 853 | End: 2018-07-05
Payer: COMMERCIAL

## 2018-07-05 ENCOUNTER — ANESTHESIA EVENT (OUTPATIENT)
Dept: NON INVASIVE DIAGNOSTICS | Age: 68
DRG: 853 | End: 2018-07-05
Payer: COMMERCIAL

## 2018-07-05 LAB
ANION GAP SERPL CALC-SCNC: 9 MMOL/L (ref 5–15)
BUN SERPL-MCNC: 23 MG/DL (ref 6–20)
BUN/CREAT SERPL: 21 (ref 12–20)
CALCIUM SERPL-MCNC: 8.2 MG/DL (ref 8.5–10.1)
CHLORIDE SERPL-SCNC: 112 MMOL/L (ref 97–108)
CO2 SERPL-SCNC: 22 MMOL/L (ref 21–32)
CREAT SERPL-MCNC: 1.09 MG/DL (ref 0.7–1.3)
GLUCOSE SERPL-MCNC: 131 MG/DL (ref 65–100)
POTASSIUM SERPL-SCNC: 4.3 MMOL/L (ref 3.5–5.1)
SODIUM SERPL-SCNC: 143 MMOL/L (ref 136–145)

## 2018-07-05 PROCEDURE — 74011250636 HC RX REV CODE- 250/636

## 2018-07-05 PROCEDURE — 80048 BASIC METABOLIC PNL TOTAL CA: CPT | Performed by: HOSPITALIST

## 2018-07-05 PROCEDURE — 02K83ZZ MAP CONDUCTION MECHANISM, PERCUTANEOUS APPROACH: ICD-10-PCS | Performed by: INTERNAL MEDICINE

## 2018-07-05 PROCEDURE — 77030029065 HC DRSG HEMO QCLOT ZMED -B

## 2018-07-05 PROCEDURE — C1730 CATH, EP, 19 OR FEW ELECT: HCPCS

## 2018-07-05 PROCEDURE — 77030010880 HC CBL EP SUPRME STJU -C

## 2018-07-05 PROCEDURE — 4A023FZ MEASUREMENT OF CARDIAC RHYTHM, PERCUTANEOUS APPROACH: ICD-10-PCS | Performed by: INTERNAL MEDICINE

## 2018-07-05 PROCEDURE — 77030011640 HC PAD GRND REM COVD -A

## 2018-07-05 PROCEDURE — B24BZZ4 ULTRASONOGRAPHY OF HEART WITH AORTA, TRANSESOPHAGEAL: ICD-10-PCS | Performed by: INTERNAL MEDICINE

## 2018-07-05 PROCEDURE — 77030004964 HC CBL EP ABLAT BSC -C

## 2018-07-05 PROCEDURE — 74011250637 HC RX REV CODE- 250/637: Performed by: HOSPITALIST

## 2018-07-05 PROCEDURE — 74011250637 HC RX REV CODE- 250/637: Performed by: INTERNAL MEDICINE

## 2018-07-05 PROCEDURE — 93312 ECHO TRANSESOPHAGEAL: CPT

## 2018-07-05 PROCEDURE — 74011000250 HC RX REV CODE- 250

## 2018-07-05 PROCEDURE — 02583ZZ DESTRUCTION OF CONDUCTION MECHANISM, PERCUTANEOUS APPROACH: ICD-10-PCS | Performed by: INTERNAL MEDICINE

## 2018-07-05 PROCEDURE — 5A2204Z RESTORATION OF CARDIAC RHYTHM, SINGLE: ICD-10-PCS | Performed by: INTERNAL MEDICINE

## 2018-07-05 PROCEDURE — 65660000000 HC RM CCU STEPDOWN

## 2018-07-05 PROCEDURE — 77030015398 HC CBL EP EXT STJU -C

## 2018-07-05 PROCEDURE — 77030018729 HC ELECTRD DEFIB PAD CARD -B

## 2018-07-05 PROCEDURE — C1894 INTRO/SHEATH, NON-LASER: HCPCS

## 2018-07-05 PROCEDURE — 36415 COLL VENOUS BLD VENIPUNCTURE: CPT | Performed by: HOSPITALIST

## 2018-07-05 PROCEDURE — 74011636637 HC RX REV CODE- 636/637: Performed by: HOSPITALIST

## 2018-07-05 PROCEDURE — 4A0234Z MEASUREMENT OF CARDIAC ELECTRICAL ACTIVITY, PERCUTANEOUS APPROACH: ICD-10-PCS | Performed by: INTERNAL MEDICINE

## 2018-07-05 PROCEDURE — 93613 INTRACARDIAC EPHYS 3D MAPG: CPT

## 2018-07-05 PROCEDURE — 74011250636 HC RX REV CODE- 250/636: Performed by: INTERNAL MEDICINE

## 2018-07-05 PROCEDURE — 77030030806 HC PTCH ENSIT NAVX STJU -G

## 2018-07-05 RX ORDER — SODIUM CHLORIDE 0.9 % (FLUSH) 0.9 %
5-10 SYRINGE (ML) INJECTION AS NEEDED
Status: DISCONTINUED | OUTPATIENT
Start: 2018-07-05 | End: 2018-07-07 | Stop reason: HOSPADM

## 2018-07-05 RX ORDER — SODIUM CHLORIDE 0.9 % (FLUSH) 0.9 %
5-10 SYRINGE (ML) INJECTION EVERY 8 HOURS
Status: DISCONTINUED | OUTPATIENT
Start: 2018-07-05 | End: 2018-07-07 | Stop reason: HOSPADM

## 2018-07-05 RX ORDER — PROPOFOL 10 MG/ML
INJECTION, EMULSION INTRAVENOUS AS NEEDED
Status: DISCONTINUED | OUTPATIENT
Start: 2018-07-05 | End: 2018-07-05 | Stop reason: HOSPADM

## 2018-07-05 RX ORDER — LIDOCAINE HYDROCHLORIDE 10 MG/ML
1-40 INJECTION INFILTRATION; PERINEURAL
Status: DISCONTINUED | OUTPATIENT
Start: 2018-07-05 | End: 2018-07-05 | Stop reason: HOSPADM

## 2018-07-05 RX ORDER — FENTANYL CITRATE 50 UG/ML
INJECTION, SOLUTION INTRAMUSCULAR; INTRAVENOUS AS NEEDED
Status: DISCONTINUED | OUTPATIENT
Start: 2018-07-05 | End: 2018-07-05 | Stop reason: HOSPADM

## 2018-07-05 RX ORDER — ENOXAPARIN SODIUM 100 MG/ML
40 INJECTION SUBCUTANEOUS EVERY 24 HOURS
Status: DISCONTINUED | OUTPATIENT
Start: 2018-07-05 | End: 2018-07-06

## 2018-07-05 RX ORDER — PROPOFOL 10 MG/ML
INJECTION, EMULSION INTRAVENOUS
Status: DISCONTINUED | OUTPATIENT
Start: 2018-07-05 | End: 2018-07-05 | Stop reason: HOSPADM

## 2018-07-05 RX ORDER — LIDOCAINE HYDROCHLORIDE 10 MG/ML
INJECTION INFILTRATION; PERINEURAL
Status: COMPLETED
Start: 2018-07-05 | End: 2018-07-05

## 2018-07-05 RX ORDER — DOBUTAMINE HYDROCHLORIDE 200 MG/100ML
INJECTION INTRAVENOUS
Status: DISPENSED
Start: 2018-07-05 | End: 2018-07-06

## 2018-07-05 RX ORDER — MIDAZOLAM HYDROCHLORIDE 1 MG/ML
INJECTION, SOLUTION INTRAMUSCULAR; INTRAVENOUS AS NEEDED
Status: DISCONTINUED | OUTPATIENT
Start: 2018-07-05 | End: 2018-07-05 | Stop reason: HOSPADM

## 2018-07-05 RX ORDER — DOBUTAMINE HYDROCHLORIDE 200 MG/100ML
0-10 INJECTION INTRAVENOUS
Status: DISCONTINUED | OUTPATIENT
Start: 2018-07-05 | End: 2018-07-05 | Stop reason: HOSPADM

## 2018-07-05 RX ORDER — HEPARIN SODIUM 200 [USP'U]/100ML
500 INJECTION, SOLUTION INTRAVENOUS ONCE
Status: COMPLETED | OUTPATIENT
Start: 2018-07-05 | End: 2018-07-06

## 2018-07-05 RX ORDER — HEPARIN SODIUM 200 [USP'U]/100ML
INJECTION, SOLUTION INTRAVENOUS
Status: COMPLETED
Start: 2018-07-05 | End: 2018-07-05

## 2018-07-05 RX ORDER — KETAMINE HYDROCHLORIDE 10 MG/ML
INJECTION, SOLUTION INTRAMUSCULAR; INTRAVENOUS AS NEEDED
Status: DISCONTINUED | OUTPATIENT
Start: 2018-07-05 | End: 2018-07-05 | Stop reason: HOSPADM

## 2018-07-05 RX ORDER — SODIUM CHLORIDE 9 MG/ML
INJECTION, SOLUTION INTRAVENOUS
Status: DISCONTINUED | OUTPATIENT
Start: 2018-07-05 | End: 2018-07-05 | Stop reason: HOSPADM

## 2018-07-05 RX ADMIN — HEPARIN SODIUM 1000 UNITS: 200 INJECTION, SOLUTION INTRAVENOUS at 13:55

## 2018-07-05 RX ADMIN — THERA TABS 1 TABLET: TAB at 09:20

## 2018-07-05 RX ADMIN — KETAMINE HYDROCHLORIDE 10 MG: 10 INJECTION, SOLUTION INTRAMUSCULAR; INTRAVENOUS at 14:06

## 2018-07-05 RX ADMIN — FENTANYL CITRATE 50 MCG: 50 INJECTION, SOLUTION INTRAMUSCULAR; INTRAVENOUS at 13:31

## 2018-07-05 RX ADMIN — KETAMINE HYDROCHLORIDE 10 MG: 10 INJECTION, SOLUTION INTRAMUSCULAR; INTRAVENOUS at 14:37

## 2018-07-05 RX ADMIN — PROPOFOL 30 MG: 10 INJECTION, EMULSION INTRAVENOUS at 13:44

## 2018-07-05 RX ADMIN — MIDAZOLAM HYDROCHLORIDE 1 MG: 1 INJECTION, SOLUTION INTRAMUSCULAR; INTRAVENOUS at 13:26

## 2018-07-05 RX ADMIN — AMOXICILLIN AND CLAVULANATE POTASSIUM 1 TABLET: 875; 125 TABLET, FILM COATED ORAL at 09:20

## 2018-07-05 RX ADMIN — PROPOFOL 50 MCG/KG/MIN: 10 INJECTION, EMULSION INTRAVENOUS at 13:26

## 2018-07-05 RX ADMIN — FOLIC ACID 1 MG: 1 TABLET ORAL at 09:20

## 2018-07-05 RX ADMIN — COLCHICINE 0.6 MG: 0.6 TABLET, FILM COATED ORAL at 09:20

## 2018-07-05 RX ADMIN — FENTANYL CITRATE 50 MCG: 50 INJECTION, SOLUTION INTRAMUSCULAR; INTRAVENOUS at 13:26

## 2018-07-05 RX ADMIN — KETAMINE HYDROCHLORIDE 20 MG: 10 INJECTION, SOLUTION INTRAMUSCULAR; INTRAVENOUS at 13:28

## 2018-07-05 RX ADMIN — DOXYCYCLINE HYCLATE 100 MG: 100 TABLET, COATED ORAL at 20:08

## 2018-07-05 RX ADMIN — KETAMINE HYDROCHLORIDE 10 MG: 10 INJECTION, SOLUTION INTRAMUSCULAR; INTRAVENOUS at 14:12

## 2018-07-05 RX ADMIN — HEPARIN SODIUM 5000 UNITS: 5000 INJECTION, SOLUTION INTRAVENOUS; SUBCUTANEOUS at 09:20

## 2018-07-05 RX ADMIN — PREDNISONE 40 MG: 20 TABLET ORAL at 09:20

## 2018-07-05 RX ADMIN — MIDAZOLAM HYDROCHLORIDE 1 MG: 1 INJECTION, SOLUTION INTRAMUSCULAR; INTRAVENOUS at 13:31

## 2018-07-05 RX ADMIN — KETAMINE HYDROCHLORIDE 20 MG: 10 INJECTION, SOLUTION INTRAMUSCULAR; INTRAVENOUS at 13:33

## 2018-07-05 RX ADMIN — MUPIROCIN: 20 OINTMENT TOPICAL at 09:21

## 2018-07-05 RX ADMIN — DOXYCYCLINE HYCLATE 100 MG: 100 TABLET, COATED ORAL at 09:20

## 2018-07-05 RX ADMIN — PROPOFOL 20 MG: 10 INJECTION, EMULSION INTRAVENOUS at 14:12

## 2018-07-05 RX ADMIN — AMOXICILLIN AND CLAVULANATE POTASSIUM 1 TABLET: 875; 125 TABLET, FILM COATED ORAL at 20:08

## 2018-07-05 RX ADMIN — LIDOCAINE HYDROCHLORIDE 10 ML: 10 INJECTION INFILTRATION; PERINEURAL at 14:00

## 2018-07-05 RX ADMIN — KETAMINE HYDROCHLORIDE 20 MG: 10 INJECTION, SOLUTION INTRAMUSCULAR; INTRAVENOUS at 14:00

## 2018-07-05 RX ADMIN — KETAMINE HYDROCHLORIDE 10 MG: 10 INJECTION, SOLUTION INTRAMUSCULAR; INTRAVENOUS at 13:43

## 2018-07-05 RX ADMIN — LIDOCAINE HYDROCHLORIDE 10 ML: 10 INJECTION, SOLUTION INFILTRATION; PERINEURAL at 14:00

## 2018-07-05 RX ADMIN — DILTIAZEM HYDROCHLORIDE 180 MG: 180 CAPSULE, COATED, EXTENDED RELEASE ORAL at 09:20

## 2018-07-05 RX ADMIN — ENOXAPARIN SODIUM 40 MG: 100 INJECTION SUBCUTANEOUS at 20:08

## 2018-07-05 RX ADMIN — Medication 100 MG: at 09:20

## 2018-07-05 RX ADMIN — SODIUM CHLORIDE: 9 INJECTION, SOLUTION INTRAVENOUS at 13:00

## 2018-07-05 RX ADMIN — Medication 10 ML: at 06:00

## 2018-07-05 NOTE — PROGRESS NOTES
Dr Murcia Loose in to see pt. Updated him on pt. Dr explained tp  Pt about the results. Questions answered. Right groin dressing dry and intact. No complaints of any pain.

## 2018-07-05 NOTE — PROGRESS NOTES
TRANSFER - IN REPORT:    Verbal report received from Lissette Patrick on Otilia De Leon Sr.  being received from EP for routine post - op. Report consisted of patients Situation, Background, Assessment and Recommendations(SBAR). Information from the following report(s) SBAR, Procedure Summary, MAR, Recent Results and Cardiac Rhythm NSR was reviewed with the receiving clinician. Opportunity for questions and clarification was provided. Assessment completed upon patients arrival to [address] [place] [address] and care assumed. Cardiac Cath Lab Recovery Arrival Note:    Otilia De Leon Sr. arrived to [facility]. Patient procedure= RENA / ablation. Patient on cardiac monitor, non-invasive blood pressure, SPO2 monitor. On O2 @ 2 lpm via NC.  IV  of NS on pump at 50 ml/hr. Patient status doing well without problems. Patient is A&Ox 4. Patient reports no complaints. PROCEDURE SITE CHECK:    Procedure site:without any bleeding and dressing d/i, denies pain/discomfort reported at procedure site. No change in patient status. Continue to monitor patient and status.

## 2018-07-05 NOTE — WOUND CARE
Wound care consulted to see this patient for his gout. Pt. Has been struggling with tophaceous gout for several months and is now being treated outpatient as well as in-patient for it. The lesions are minor at this time but patient was cautioned on \"popping\" the tophi. He should keep his skin clean and take his medication regularly along with avoiding the trigger foods that they have a list of at home. Pt. Very familiar with the list as well as his wife. Pt. Also needs to keep the fingers mobile. No treatment recs at this time except the above.    Briseida Hernandez RN, BSN, [employer]

## 2018-07-05 NOTE — PROGRESS NOTES
S/p RENA-RA flutter ablation, no complications. Full note to follow. No BRIDGET thrombus noted on RENA, good BRIDGET exit velocities. Therefore, Lovenox 40 mg x 1 tonight.   Eliquis x 1 month starting tomorrow (2.5 versus 5 mg dosing, depending on bleeding risk, H&H, platelet count, etc.)

## 2018-07-05 NOTE — PROGRESS NOTES
Bedside and Verbal shift change report given to Shira Tripathi  (oncoming nurse) by Anabelle Parham (offgoing nurse). Report included the following information SBAR, Kardex, Intake/Output, MAR, Accordion and Recent Results.

## 2018-07-05 NOTE — ANESTHESIA PREPROCEDURE EVALUATION
Anesthetic History   No history of anesthetic complications            Review of Systems / Medical History  Patient summary reviewed, nursing notes reviewed and pertinent labs reviewed    Pulmonary          Smoker         Neuro/Psych   Within defined limits           Cardiovascular            Dysrhythmias : atrial flutter      Exercise tolerance: >4 METS  Comments: EF 50-60%   GI/Hepatic/Renal  Within defined limits              Endo/Other  Within defined limits           Other Findings   Comments: Gout    Resolving sepsis    Hx of heavy etOH abuse         Physical Exam    Airway  Mallampati: III  TM Distance: 4 - 6 cm  Neck ROM: normal range of motion   Mouth opening: Normal     Cardiovascular    Rhythm: irregular           Dental  No notable dental hx       Pulmonary  Breath sounds clear to auscultation               Abdominal  GI exam deferred       Other Findings            Anesthetic Plan    ASA: 2  Anesthesia type: MAC            Anesthetic plan and risks discussed with: Patient and Family

## 2018-07-05 NOTE — ANESTHESIA POSTPROCEDURE EVALUATION
Post-Anesthesia Evaluation and Assessment    Patient: Kelli Jaffe Sr. MRN: [id number]  SSN: [id number]    YOB: 1950  Age: 76 y.o. Sex: male       Cardiovascular Function/Vital Signs  Visit Vitals    /75    Pulse 87    Temp 36.4 °C (97.5 °F)    Resp 20    Ht 5' 9\" (1.753 m)    Wt 85.9 kg (189 lb 7 oz)    SpO2 97%    BMI 27.98 kg/m2       Patient is status post MAC anesthesia for * No procedures listed *. Nausea/Vomiting: None    Postoperative hydration reviewed and adequate. Pain:  Pain Scale 1: Numeric (0 - 10) (07/05/18 1541)  Pain Intensity 1: 0 (07/05/18 1541)   Managed    Neurological Status:   Neuro  Neurologic State: Alert (07/05/18 1541)  Orientation Level: Oriented X4 (07/05/18 1541)  Cognition: Appropriate decision making; Appropriate for age attention/concentration; Appropriate safety awareness (07/05/18 1541)  Speech: Appropriate for age;Clear (07/03/18 2020)  Assessment L Pupil: Deferred (07/02/18 0800)  Size L Pupil (mm): 3 (07/02/18 0800)  Assessment R Pupil: Deferred (07/02/18 0800)  Size R Pupil (mm): 3 (07/02/18 0800)  LUE Motor Response: Purposeful (07/03/18 2020)  LLE Motor Response: Purposeful (07/03/18 2020)  RUE Motor Response: Purposeful (07/03/18 2020)  RLE Motor Response: Purposeful (07/03/18 2020)   At baseline    Mental Status and Level of Consciousness: Arousable    Pulmonary Status:   O2 Device: Room air (07/05/18 1541)   Adequate oxygenation and airway patent    Complications related to anesthesia: None    Post-anesthesia assessment completed.  No concerns    Signed By: Rad Lang MD     July 5, 2018

## 2018-07-05 NOTE — PROGRESS NOTES
[facility] INTENSIVIST Consult Service Note  Pulmonary, Critical Care, and Sleep Medicine    Name: Angelica Stewart Sr. MRN: [id number]   : 1950 Hospital: [facility]   Date: 2018   Hospital Day: 5       Subjective/Interval History:   Last 24 hrs: Has some dyspnea with exertion. He was out in [place] walking around with his wife. Dry coughing. Rest of 10 point ROS is negative. NO chest pain, no back pain. NO abdominal pain. Had some mild coughing. NO sinus pain or pressure. NO leg pain. Slept ok last pm.             Patient Active Problem List   Diagnosis Code    Severe [facility] ([facility] A41.9, R65.20       IMPRESSION:   1. Severe sepsis with septic shock, resolved. 2. Atrial flutter  / Tachycardia now slower-Followed by Dr. Karina Calzada. 3. Possible COPD - due to / ppd smoking for over 40 years and clinical examination highly suggestive of COPD without acute exacerbation, Will need outpt follow up with me and PFTs. 4. Has normal LVEF and mildly decreased RVEF?   5. Acute uncontrolled Tophaceous gout  6. Hx of squamous cell CA on various parts of his body s/p blue light Rx.   7. Heavy Tobacco abuse history   8. Heavy ETOH abuse history - One pint of whiskey every two days- at high risk for withdrawal  9. Multiorgan dysfunction as outlined above: Pt has one or more acute or chronic illnesses with severe exacerbation with progression or side effects of treatment that poses a threat to life or bodily function      RECOMMENDATIONS/PLAN:   1. Will need to follow up with me upon discharge. Ph: [phone] for appt. 2. Will need outpt PFTs. 3. Can add breo 100 mcg, one inhalation daily at discharge. 4. Colchicine- Insurance now covering. 5. Monitor for withdrawal, so far negative. 6. Smoking cessation counseling, again discussed pt is interested in [address]    7. DVT, SUP prophylaxis     My assessment/management discussed with: Nursing, Pharmacy, Case Management, PT, OT, Respiratory Therapy, Hospitalist and Family for coordination of care      Subjective/Initial History:   I have reviewed the flowsheet and previous days notes. Seen earlier today on rounds. I was asked by Joaquin Rivera MD to see Gabriel Pearson Sr. a 76 y.o.  male  in consultation for a chief complaint of sepsis with shock. Excerpts from admission and consult notes reviewed as follows: \"The pt was 'fine ' about two weeks ago. He developed a cough with some feverish episodes for which his PCP prescribed steroids PO, PO doxycycline. The pt states that after taking those medicines for three to four days he developed tremulousness and felt worse. Therefore, he stopped taking those medicines. He continued to feel unwell. During this time he continues with his work and at work about a week ago he felt lightheaded and noticed blind spots and he was unable to move due to acute weakness. He took time off ( I believe) and spent resting at home last few days. He didn't listen to his wife's request and didn't come to ER when he felt unwell last Monday. This morning he was brought to ER and since then workup has revealed above findings. 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 \"    Frustrated because Popeye Harris trying to treat gout but insurance company fails to cover any of the meds. Punctured tophae on knuckles. Weaned off pressors. PMH:  has a past medical history of Ill-defined condition. PSH:   has no past surgical history on file. FHX: family history is not on file. SHX:  reports that he has been smoking. He has been smoking about 1.50 packs per day. He has quit using smokeless tobacco. He reports that he drinks about 6.0 oz of alcohol per week  He reports that he does not use illicit drugs.       ROS:A comprehensive review of systems was negative except for that written in the HPI.       No Known Allergies Medications:      MEDS:   Current Facility-Administered Medications   Medication    lidocaine (XYLOCAINE) 10 mg/mL (1 %) injection 1-40 mL    DOBUTamine (DOBUTREX) 2000 mcg/ml 50 ml    DOBUTamine (DOBUTREX) 100 mg/50 mL (2,000 mcg/mL) infusion    doxycycline (VIBRA-TABS) tablet 100 mg    amoxicillin-clavulanate (AUGMENTIN) 875-125 mg per tablet 1 Tab    predniSONE (DELTASONE) tablet 40 mg    heparin (porcine) injection 5,000 Units    dilTIAZem CD (CARDIZEM CD) capsule 180 mg    chlordiazePOXIDE (LIBRIUM) capsule 5 mg    colchicine tablet 0.6 mg    Thiamine Mononitrate (B-1) tablet 756 mg    folic acid (FOLVITE) tablet 1 mg    therapeutic multivitamin (THERAGRAN) tablet 1 Tab    sodium chloride (NS) flush 5-10 mL    sodium chloride (NS) flush 5-10 mL    acetaminophen (TYLENOL) tablet 500 mg    HYDROcodone-acetaminophen (NORCO) 5-325 mg per tablet 1 Tab    naloxone (NARCAN) injection 0.4 mg    ondansetron (ZOFRAN) injection 2 mg    bisacodyl (DULCOLAX) tablet 5 mg    nicotine (NICODERM CQ) 7 mg/24 hr patch 1 Patch    mupirocin (BACTROBAN) 2 % ointment     Facility-Administered Medications Ordered in Other Encounters   Medication    propofol (DIPRIVAN) 10 mg/mL injection    midazolam (VERSED) injection    fentaNYL citrate (PF) injection    ketamine (KETALAR) 10 mg/mL injection    0.9% sodium chloride infusion    propofol (DIPRIVAN) 10 mg/mL injection        Current Facility-Administered Medications:     lidocaine (XYLOCAINE) 10 mg/mL (1 %) injection 1-40 mL, 1-40 mL, SubCUTAneous, Multiple, Elsy Carrillo MD, 10 mL at 07/05/18 1400    DOBUTamine (DOBUTREX) 2000 mcg/ml 50 ml, 0-10 mcg/kg/min, IntraVENous, TITRATE, Elsy Carrillo MD    DOBUTamine (DOBUTREX) 100 mg/50 mL (2,000 mcg/mL) infusion, , , ,     doxycycline (VIBRA-TABS) tablet 100 mg, 100 mg, Oral, Q12H, Fiorella Le MD, 100 mg at 07/05/18 0920   amoxicillin-clavulanate (AUGMENTIN) 875-125 mg per tablet 1 Tab, 1 Tab, Oral, Q12H, Yolie Ghosh MD, 1 Tab at 07/05/18 0920    predniSONE (DELTASONE) tablet 40 mg, 40 mg, Oral, DAILY WITH BREAKFAST, Yolie Ghosh MD, 40 mg at 07/05/18 0920    heparin (porcine) injection 5,000 Units, 5,000 Units, SubCUTAneous, Q8H, Jessica Otero MD, 5,000 Units at 07/05/18 0920    dilTIAZem CD (CARDIZEM CD) capsule 180 mg, 180 mg, Oral, DAILY, Tresa Brown MD, 180 mg at 07/05/18 0920    chlordiazePOXIDE (LIBRIUM) capsule 5 mg, 5 mg, Oral, QID PRN, Ilene Quintana MD    colchicine tablet 0.6 mg, 0.6 mg, Oral, DAILY, Ilene Quintana MD, 0.6 mg at 07/05/18 0920    Thiamine Mononitrate (B-1) tablet 100 mg, 100 mg, Oral, DAILY, Ilene Quintana MD, 100 mg at 08/01/07 7035    folic acid (FOLVITE) tablet 1 mg, 1 mg, Oral, DAILY, Ilene Quintana MD, 1 mg at 07/05/18 0920    therapeutic multivitamin (THERAGRAN) tablet 1 Tab, 1 Tab, Oral, DAILY, Ilene Quintana MD, 1 Tab at 07/05/18 0920    sodium chloride (NS) flush 5-10 mL, 5-10 mL, IntraVENous, Q8H, Natalya Hopkins MD, 10 mL at 07/05/18 0600    sodium chloride (NS) flush 5-10 mL, 5-10 mL, IntraVENous, PRN, Natalya Hopkins MD    acetaminophen (TYLENOL) tablet 500 mg, 500 mg, Oral, Q4H PRN, Natalya Hopkins MD    HYDROcodone-acetaminophen (NORCO) 5-325 mg per tablet 1 Tab, 1 Tab, Oral, Q6H PRN, Natalya Hopkins MD    naloxone [facility]) injection 0.4 mg, 0.4 mg, IntraVENous, PRN, Natalya Hopkins MD    ondansetron [facility]) injection 2 mg, 2 mg, IntraVENous, Q6H PRN, Natalya Hopkins MD    bisacodyl (DULCOLAX) tablet 5 mg, 5 mg, Oral, DAILY PRN, Natalya Hopkins MD    nicotine (NICODERM CQ) 7 mg/24 hr patch 1 Patch, 1 Patch, TransDERmal, DAILY PRN, Natalya Hopkins MD    mupirocin (BACTROBAN) 2 % ointment, , Both Nostrils, Q12H, Shiloh Patricio MD    Facility-Administered Medications Ordered in Other Encounters:     propofol (DIPRIVAN) 10 mg/mL injection, , IntraVENous, CONTINUOUS, Roena Cancel Demetris Pont, Last Rate: 30.9 mL/hr at 18 1404, 60 mcg/kg/min at 18 1404    midazolam (VERSED) injection, , IntraVENous, PRN, Maria Fernanda Glassing, CRNA, 1 mg at 18 1331    fentaNYL citrate (PF) injection, , , PRN, Maria Fernanda Glassing, CRNA, 50 mcg at 18 1331    ketamine (KETALAR) 10 mg/mL injection, , IntraVENous, PRN, Maria Fernanda Glassing, CRNA, 10 mg at 18 1406    0.9% sodium chloride infusion, , IntraVENous, CONTINUOUS, Maria Fernanda Glassing, CRNA    propofol (DIPRIVAN) 10 mg/mL injection, , IntraVENous, PRN, Maria Fernanda Glassing, CRNA, 30 mg at 18 1344      Objective:     Vital Signs: Telemetry:    normal sinus rhythm Intake/Output:   Visit Vitals    /76 (BP 1 Location: Right arm, BP Patient Position: Sitting)    Pulse 91    Temp 98.5 °F (36.9 °C)    Resp 18    Ht 5' 9\" (1.753 m)    Wt 85.9 kg (189 lb 7 oz)    SpO2 98%    BMI 27.98 kg/m2       Temp (24hrs), Av.3 °F (36.8 °C), Min:98 °F (36.7 °C), Max:98.6 °F (37 °C)        O2 Device: Room air       Body mass index is 27.98 kg/(m^2). Wt Readings from Last 4 Encounters:   18 85.9 kg (189 lb 7 oz)   18 82.1 kg (181 lb)   10/21/17 82.1 kg (181 lb)          Intake/Output Summary (Last 24 hours) at 18 1409  Last data filed at 18 0525   Gross per 24 hour   Intake                0 ml   Output              500 ml   Net             -500 ml       Last shift:         Last 3 shifts: 1901 -  0700  In: 80 [P.O.:480; I.V.:300]  Out: 700 [Urine:700]         Physical Exam:     General:  male; in no respiratory distress and acyanotic; room air;   HEAD: Normocephalic, without obvious abnormality, atraumatic   EYES: conjunctivae clear. PERRL,  AN Icteric sclerae   NOSE: nares normal, no drainage, no nasal flaring,    THROAT: Lips, mucosa dry; No Thrush; class 3 airway;  tongue midline   Neck: Supple, symmetrical, trachea midline,  No accessory mm use;  No Stridor/ cuff leak, No goiter or thyroid tenderness   LYMPH: No abnormally enlarged lymph nodes. in neck or groin   Chest: normal   Lungs: decreased air exchange bibasilarly. No wheezing, no rhonchi. Breathing comfortably. Heart: Regular rate and rhythm; nl s1,2. NO MRG. Abdomen: soft, nontender   : Hayes; clear urine; NO gross hematuria   Extremity: negative, clubbing, multiple joint deformities from tophaceous gout; joint swelling and erythema   Neuro: alert; speech fluent withdraws to pain; unable to check gait and station; does follow simple commands   Psych: oriented to time, place and person ; mild agitation;    Skin: Warm;    Pulses:Bilateral, Radial, 2+   Capillary refill: normal; well perfused, Neuro: intact. Good strength of BUE and BLE. Psych: no anxiety or depression      Data:         All lab results for the last 24 hours reviewed. Labs:    Recent Labs      07/04/18   0036 07/03/18 0139   WBC  8.0  9.1   HGB  9.6*  10.3*   PLT  149*  150     Recent Labs      07/05/18   0315  07/04/18 0036  07/03/18   0139   NA  143  144  140   K  4.3  4.3  4.9   CL  112*  114*  113*   CO2  22  20*  19*   GLU  131*  84  152*   BUN  23*  29*  33*   CREA  1.09  1.26  1.35*   CA  8.2*  8.4*  8.3*     No results for input(s): PH, PCO2, PO2, HCO3, FIO2 in the last 72 hours. No results for input(s): CPK, CKNDX, TROIQ in the last 72 hours. No lab exists for component: CPKMB  No results found for: BNPP, BNP   Lab Results   Component Value Date/Time    Culture result: MRSA NOT PRESENT 07/01/2018 08:51 PM    Culture result:  07/01/2018 08:51 PM         Screening of patient nares for MRSA is for surveillance purposes and, if positive, to facilitate isolation considerations in high risk settings. It is not intended for automatic decolonization interventions per se as regimens are not sufficiently effective to warrant routine use.     Culture result: NO GROWTH 4 DAYS 07/01/2018 11:23 AM     Lab Results   Component Value Date/Time Vancomycin,trough 13.9 (H) 07/04/2018 12:36 AM    CK 30 (L) 07/01/2018 11:23 AM         Imaging:  I have personally reviewed the patients radiographs and have reviewed the reports:          Results from Hospital Encounter encounter on 07/01/18   XR CHEST PORT   Narrative EXAM:  XR CHEST PORT    INDICATION:  cvl PLACEMENT    COMPARISON:  7/1/2018    FINDINGS: A portable AP radiograph of the chest was obtained at 1619 hours. Right IJ catheter tip overlies SVC. There is no pneumothorax. Visualized lungs  are clear. . . Heart size is stable. .          Impression IMPRESSION: Right IJ catheter tip overlies SVC. There is no pneumothorax. No results found for this or any previous visit. This care involved high complexity decision making which includes independently reviewing the patient's past medical records, current laboratory results, medication profiles that were immediately available to me and actual Xray images at the bedside in order to assess, support vital system function, and to treat this degree of vital organ system failure, and to prevent further deterioration of the patients condition. Medical Decision Making Today:  · Reviewed the flowsheet and previous days notes  · Reviewed and summarized records or history from previous days note or discussions with staff, family  · Parenteral controlled substances - Reviewed/ Adjusted / Sukumar Sear / Started  · High Risk Drug therapy requiring intensive monitoring for toxicity: eg steroids, pressors, antibiotics  · MAR reviewed and pertinent medications noted or modified as needed  · Reviewed and/or ordered Clinical lab tests  · Reviewed and/or ordered Radiology tests  · Reviewed and/or ordered of Medicine tests  · Independently visualized radiologic Images  · I have personally reviewed the patients ECG / Telemetry  ·          Thank you for allowing us to participate in the care of this patient. We will be happy to follow along with you.     Amy Carlin, MD

## 2018-07-05 NOTE — PROGRESS NOTES
1320 patient taken to EP lab for RENA and aflutter ablation. Patient sent with belongings and wife at bedside at home of transfer.

## 2018-07-05 NOTE — PROGRESS NOTES
Hospitalist Progress Note    NAME: Blanca Dooley Sr.   :  1950   MRN:  [id number]       Assessment / Plan:  Severe sepsis with shock ( unclear etiology) on admission   Probably superinfection with tophaceous gout  Acute uncontrolled tophaceous gout     Needing pressors and solucortef on admission  solucortef stopped in the ICU on 7/3  Blood cultures on  so far neg  Chest xray neg  Started on empiric vanco and cefepime,changed  to po doxy and augmentin on to complete the total 7-10 days  ( D5/7)  On colchicine  Wound care consult pending  Seen by ortho, plan to follow up as out patient  Pt has allopurinol at home, plan was to start once over with acute attack. Per the patient, he has left elbow boggy swelling since couple of years, get infected  Needing antibiotics couple of times    Hx of squamous cell CA on various parts of his body s/p blue light Rx. Heavy Tobacco abuse history   Heavy ETOH abuse history - One pint of whiskey every two days- at high risk for withdrawal    ? Copd with smoking history  Needs PFT as out patient  Follow up with Dr. Carlos Garrido as out patient  + hepatic steatosis, encouraged alcohol cessation, verbalized understanding    Aflutter  Echo with ef 55-60% no wall motion abnormalities  Seen by the cardiology this admission  On , diltiazem  Plan for ablation today      haroon  Resolved, cr 1 today   Improving, will monitor  Follow the renal sono with renal cyst ( need follow up as out patient)        tsh borderline high, which was check when he is critically ill, would repeat in next couple of weeks     Body mass index is 27.98 kg/(m^2). over weight    Code status: full  Prophylaxis: heparin  Recommended Disposition: tbd     Subjective:     Chief Complaint / Reason for Physician Visit  Walking in the hallway, feels better    Discussed with RN events overnight.      Review of Systems:  Symptom Y/N Comments  Symptom Y/N Comments   Fever/Chills n   Chest Pain n    Poor Appetite Edema     Cough n   Abdominal Pain n    Sputum    Joint Pain     SOB/DANIELS    Pruritis/Rash     Nausea/vomit    Tolerating PT/OT     Diarrhea    Tolerating Diet     Constipation    Other       Could NOT obtain due to:      Objective:     VITALS:   Last 24hrs VS reviewed since prior progress note. Most recent are:  Patient Vitals for the past 24 hrs:   Temp Pulse Resp BP SpO2   07/05/18 1133 98.5 °F (36.9 °C) 91 18 125/76 98 %   07/05/18 0758 98 °F (36.7 °C) 91 18 125/76 97 %   07/05/18 0533 98.3 °F (36.8 °C) 87 20 115/75 99 %   07/05/18 0015 98.2 °F (36.8 °C) 87 20 103/63 97 %   07/04/18 1854 98.6 °F (37 °C) 95 16 127/69 96 %   07/04/18 1611 98.4 °F (36.9 °C) 92 16 118/65 96 %       Intake/Output Summary (Last 24 hours) at 07/05/18 1330  Last data filed at 07/05/18 0525   Gross per 24 hour   Intake                0 ml   Output              500 ml   Net             -500 ml        PHYSICAL EXAM:  General:  Alert,  cooperative, no  acute distress    EENT:  EOMI. Anicteric sclerae. MMM  Resp:  B/l decreased air entry, no crackles No accessory muscle use  CV:  Regular  rhythm,  No edema  GI:  Soft, Non distended, Non tender.  +Bowel sounds  Neurologic:  Alert and oriented X 3, normal speech,   Psych:   Good insight. Not anxious nor agitated  Skin:  No rashes. No jaundice, right 2 nd finger tophi,with redness and drainage left elbow boggy swelling no tenderness  Reviewed most current lab test results and cultures  YES  Reviewed most current radiology test results   YES  Review and summation of old records today    NO  Reviewed patient's current orders and MAR    YES  PMH/SH reviewed - no change compared to H&P  ________________________________________________________________________  Care Plan discussed with:    Comments   Patient y    Family      RN y    Care Manager     Consultant                        Multidiciplinary team rounds were held today with , nursing, pharmacist and clinical coordinator. Patient's plan of care was discussed; medications were reviewed and discharge planning was addressed. ________________________________________________________________________  Total NON critical care TIME: 25 Minutes    Total CRITICAL CARE TIME Spent:   Minutes non procedure based      Comments   >50% of visit spent in counseling and coordination of care     ________________________________________________________________________  Rosie Mueller MD     Procedures: see electronic medical records for all procedures/Xrays and details which were not copied into this note but were reviewed prior to creation of Plan. LABS:  I reviewed today's most current labs and imaging studies.   Pertinent labs include:  Recent Labs      07/04/18 0036  07/03/18 0139   WBC  8.0  9.1   HGB  9.6*  10.3*   HCT  27.8*  29.2*   PLT  149*  150     Recent Labs      07/05/18   0315  07/04/18 0036  07/03/18 0139   NA  143  144  140   K  4.3  4.3  4.9   CL  112*  114*  113*   CO2  22  20*  19*   GLU  131*  84  152*   BUN  23*  29*  33*   CREA  1.09  1.26  1.35*   CA  8.2*  8.4*  8.3*       Signed: Rosie Mueller MD

## 2018-07-06 LAB
BACTERIA SPEC CULT: NORMAL
ERYTHROCYTE [DISTWIDTH] IN BLOOD BY AUTOMATED COUNT: 14.2 % (ref 11.5–14.5)
HCT VFR BLD AUTO: 27.9 % (ref 36.6–50.3)
HGB BLD-MCNC: 9.8 G/DL (ref 12.1–17)
MCH RBC QN AUTO: 31 PG (ref 26–34)
MCHC RBC AUTO-ENTMCNC: 35.1 G/DL (ref 30–36.5)
MCV RBC AUTO: 88.3 FL (ref 80–99)
NRBC # BLD: 0.03 K/UL (ref 0–0.01)
NRBC BLD-RTO: 0.3 PER 100 WBC
PLATELET # BLD AUTO: 152 K/UL (ref 150–400)
PMV BLD AUTO: 9.8 FL (ref 8.9–12.9)
RBC # BLD AUTO: 3.16 M/UL (ref 4.1–5.7)
SERVICE CMNT-IMP: NORMAL
WBC # BLD AUTO: 11.5 K/UL (ref 4.1–11.1)

## 2018-07-06 PROCEDURE — 74011250637 HC RX REV CODE- 250/637: Performed by: INTERNAL MEDICINE

## 2018-07-06 PROCEDURE — 65660000000 HC RM CCU STEPDOWN

## 2018-07-06 PROCEDURE — 74011250637 HC RX REV CODE- 250/637: Performed by: HOSPITALIST

## 2018-07-06 PROCEDURE — 74011636637 HC RX REV CODE- 636/637: Performed by: HOSPITALIST

## 2018-07-06 PROCEDURE — 36415 COLL VENOUS BLD VENIPUNCTURE: CPT | Performed by: INTERNAL MEDICINE

## 2018-07-06 PROCEDURE — 85027 COMPLETE CBC AUTOMATED: CPT | Performed by: INTERNAL MEDICINE

## 2018-07-06 RX ORDER — PROPRANOLOL HYDROCHLORIDE 10 MG/1
20 TABLET ORAL 2 TIMES DAILY
Status: DISCONTINUED | OUTPATIENT
Start: 2018-07-07 | End: 2018-07-06

## 2018-07-06 RX ORDER — PROPRANOLOL HYDROCHLORIDE 10 MG/1
10 TABLET ORAL 2 TIMES DAILY
Status: DISCONTINUED | OUTPATIENT
Start: 2018-07-07 | End: 2018-07-07 | Stop reason: HOSPADM

## 2018-07-06 RX ORDER — DILTIAZEM HYDROCHLORIDE 120 MG/1
120 CAPSULE, COATED, EXTENDED RELEASE ORAL DAILY
Status: DISCONTINUED | OUTPATIENT
Start: 2018-07-07 | End: 2018-07-06

## 2018-07-06 RX ORDER — PREDNISONE 20 MG/1
20 TABLET ORAL
Status: DISCONTINUED | OUTPATIENT
Start: 2018-07-07 | End: 2018-07-07 | Stop reason: HOSPADM

## 2018-07-06 RX ADMIN — AMOXICILLIN AND CLAVULANATE POTASSIUM 1 TABLET: 875; 125 TABLET, FILM COATED ORAL at 21:00

## 2018-07-06 RX ADMIN — APIXABAN 2.5 MG: 2.5 TABLET, FILM COATED ORAL at 18:39

## 2018-07-06 RX ADMIN — FOLIC ACID 1 MG: 1 TABLET ORAL at 08:50

## 2018-07-06 RX ADMIN — Medication 10 ML: at 13:41

## 2018-07-06 RX ADMIN — DOXYCYCLINE HYCLATE 100 MG: 100 TABLET, COATED ORAL at 21:02

## 2018-07-06 RX ADMIN — Medication 10 ML: at 21:03

## 2018-07-06 RX ADMIN — DILTIAZEM HYDROCHLORIDE 180 MG: 180 CAPSULE, COATED, EXTENDED RELEASE ORAL at 08:50

## 2018-07-06 RX ADMIN — APIXABAN 2.5 MG: 2.5 TABLET, FILM COATED ORAL at 10:14

## 2018-07-06 RX ADMIN — AMOXICILLIN AND CLAVULANATE POTASSIUM 1 TABLET: 875; 125 TABLET, FILM COATED ORAL at 08:50

## 2018-07-06 RX ADMIN — PREDNISONE 40 MG: 20 TABLET ORAL at 08:51

## 2018-07-06 RX ADMIN — THERA TABS 1 TABLET: TAB at 08:50

## 2018-07-06 RX ADMIN — COLCHICINE 0.6 MG: 0.6 TABLET, FILM COATED ORAL at 08:53

## 2018-07-06 RX ADMIN — DOXYCYCLINE HYCLATE 100 MG: 100 TABLET, COATED ORAL at 08:50

## 2018-07-06 RX ADMIN — Medication 100 MG: at 08:53

## 2018-07-06 NOTE — PROGRESS NOTES
Progress Note      7/6/2018   NAME: Bharti Ramachandran Sr. MRN:  [id number]   Admit Diagnosis: Severe sepsis [facility])     Assessment:     1. Newly-diagnosed persistent atrial flutter with typical RA morphology on ECG, now s/p ablation, IN SINUS. ChadsVasc score 2 (age, aortic plaque). 2.  Asymptomatic nonsustained monomorphic VT. 3.  Sepsis. Improved. 4.  PATRICK, improving. Hyperkalemia, resolved. 5.  Chronic tobacco abuse. 6.  COPD. Mildly-dilated RV with mild hypokinesis on transthoracic echo here. 7.  Tophaceous gout, with acute exacerbation. 8.  Alcohol use. Hepatic parenchymal disease suggested by ultrasound here. PT/INR 11.1/1.1.  9.  Moderate Anemia. 10. Mild thrombocytopenia. Plan:     1. Stopped aspirin. 2.  Will use Eliquis 2.5 mg po bid (reduced dose given anemia, liver disease, mild thrombocytopenia, and good BRIDGET contractile function noted on RENA) x 4 weeks post-ablation. 3.  Will change diltiazem CD (last dose 7/6) to propranolol IR 10 bid starting tomorrow given alcohol history and nonsustained VT. 4.  Check CBC today. All questions answered for him and his wife. [x]        High complexity decision making was performed    Subjective:     Bharti Ramachandran Sr. denies chest pain, dyspnea. Walked today, stable dyspnea on exertion. Says he feels OK. Discussed with RN events overnight. Patient Active Problem List   Diagnosis Code    [facility] A41.9, R65.20       Review of Systems:    Symptom Y/N Comments  Symptom Y/N Comments   Fever/Chills N   Chest Pain N    Poor Appetite N   Edema N    Cough N   Abdominal Pain N    Sputum N   Joint Pain Y    SOB/DANIELS Y   Pruritis/Rash N    Nausea/vomit N   Tolerating PT/OT Y    Diarrhea N   Tolerating Diet Y    Constipation N   Other       Could NOT obtain due to:      Objective:      Physical Exam:    Last 24hrs VS reviewed since prior progress note.  Most recent are:    Visit Vitals    /77 (BP 1 Location: Right arm, BP Patient Position: At rest)    Pulse 72    Temp 98 °F (36.7 °C)    Resp 18    Ht 5' 9\" (1.753 m)    Wt 87.1 kg (192 lb 0.3 oz)    SpO2 97%    BMI 28.36 kg/m2       Intake/Output Summary (Last 24 hours) at 07/06/18 0857  Last data filed at 07/06/18 0715   Gross per 24 hour   Intake              475 ml   Output                0 ml   Net              475 ml        General Appearance: Well developed, well nourished, alert & oriented x3, no acute distress. Ears/Nose/Mouth/Throat: Hearing grossly normal.  No abnormalities. Neck:  Supple, nontender. Chest: Lungs clear to auscultation bilaterally. Unlabored. Symmetric air movement. Cardiovascular: Regular rate and rhythm, S1S2 normal, no murmur. Abdomen: Soft, non-tender, bowel sounds are active. Extremities: No edema bilaterally. No cyanosis or clubbing.  +tophi. Skin: Warm and dry. No jaundice, petechiae, or purpura. Neuro:  Mild resting tremor in his hands. Awake, appropriate. Grossly nonfocal.    PMH/SH reviewed - no change compared to H&P    Data Review    Telemetry:  SR 70-80's, occasional PVC's. 13 beats of nonsustained monomorphic VT on 7/6 AM.    Lab Data Personally Reviewed:    Recent Labs      07/04/18   0036   WBC  8.0   HGB  9.6*   HCT  27.8*   PLT  149*     Recent Labs      07/05/18   0315  07/04/18   0036   NA  143  144   K  4.3  4.3   CL  112*  114*   CO2  22  20*   BUN  23*  29*   CREA  1.09  1.26   GLU  131*  84   CA  8.2*  8.4*     No results for input(s): PH, PCO2, PO2 in the last 72 hours.     Medications Personally Reviewed:    Current Facility-Administered Medications   Medication Dose Route Frequency    [START ON 7/7/2018] dilTIAZem CD (CARDIZEM CD) capsule 120 mg  120 mg Oral DAILY    apixaban (ELIQUIS) tablet 2.5 mg  2.5 mg Oral BID    sodium chloride (NS) flush 5-10 mL  5-10 mL IntraVENous Q8H    sodium chloride (NS) flush 5-10 mL  5-10 mL IntraVENous PRN    doxycycline (VIBRA-TABS) tablet 100 mg  100 mg Oral Q12H  amoxicillin-clavulanate (AUGMENTIN) 875-125 mg per tablet 1 Tab  1 Tab Oral Q12H    predniSONE (DELTASONE) tablet 40 mg  40 mg Oral DAILY WITH BREAKFAST    chlordiazePOXIDE (LIBRIUM) capsule 5 mg  5 mg Oral QID PRN    colchicine tablet 0.6 mg  0.6 mg Oral DAILY    Thiamine Mononitrate (B-1) tablet 100 mg  100 mg Oral DAILY    folic acid (FOLVITE) tablet 1 mg  1 mg Oral DAILY    therapeutic multivitamin (THERAGRAN) tablet 1 Tab  1 Tab Oral DAILY    sodium chloride (NS) flush 5-10 mL  5-10 mL IntraVENous Q8H    sodium chloride (NS) flush 5-10 mL  5-10 mL IntraVENous PRN    acetaminophen (TYLENOL) tablet 500 mg  500 mg Oral Q4H PRN    HYDROcodone-acetaminophen (NORCO) 5-325 mg per tablet 1 Tab  1 Tab Oral Q6H PRN    naloxone (NARCAN) injection 0.4 mg  0.4 mg IntraVENous PRN    ondansetron (ZOFRAN) injection 2 mg  2 mg IntraVENous Q6H PRN    bisacodyl (DULCOLAX) tablet 5 mg  5 mg Oral DAILY PRN    nicotine (NICODERM CQ) 7 mg/24 hr patch 1 Patch  1 Patch TransDERmal DAILY PRN    mupirocin (BACTROBAN) 2 % ointment   Both Nostrils Q12H         Pedro Alva MD

## 2018-07-06 NOTE — PROGRESS NOTES
Spiritual Care Partner Volunteer visited patient in [employer] on 7/6/18. Documented by:  Katarina Casey M.Div.    Paging Service [phone])

## 2018-07-06 NOTE — PROGRESS NOTES
Bedside report received from Noris Renteria RN                  Assessment, Background, Procedure summary, Intake/Output, MAR, and recent results discussed. Care assumed. Pt ambulated in hallway. Pt appears steady and has no complaints of pain, shortness of breath, dizziness, or light-headedness. Incision appears clean, dry, and intact with no swelling or hematoma present. Small run of asymptomatic V-tach noted on return to room. PAC's also noted to increase in frequency. Dr. Brody Marroquin notified. Pt ambulated in hallway. Pt appears steady and has no complaints of pain, shortness of breath, dizziness, or light-headedness. Incision appears clean, dry, and intact with no swelling or hematoma present. HR and rhythm more regular with no V-tach. Verbal report given to oncoming nurse Cindy Hardin RN    Report consisted of patients Situation, Background, Assessment, and   Recommendations    Information was reviewed with the receiving nurse. Opportunity for questions and clarification was provided.       Lilly Bills RN

## 2018-07-06 NOTE — PROCEDURES
[address]  [address] [facility], [address] [facility]  [phone]    Patient ID:  Patient: Bishop Maria Del Rosario Webber MRN: [id number]  Age: 76 y.o.  : 1950  Gender: male  Study Date: 2018     History: This is a male with a history of persistent atrial flutter off anticoagulation, here for transesophageal echo to look for intracardiac thrombus.      PROCEDURE: The complete study included complete 2D imaging, M-mode, spectral Doppler, and color Doppler. The RENA probe was passed easily into the esophagus. Images were adequate. At the end of the procedure, the probe was removed without issue. There were no complications during the procedure. Sedation was administered by the anesthesiologist who was in constant attendance throughout the procedure.  Jose Rafael Purpura in atrial flutter at the time of the procedure.          FINDINGS:  LEFT VENTRICLE: Size was normal. Systolic function was normal without wall motion abnormality with an ejection fraction estimated to be 55-60%. Wall thickness was normal.    RIGHT VENTRICLE: The size was normal. Systolic function was normal. Wall thickness was normal.    LEFT ATRIUM: Size was normal. No thrombus was identified. APPENDAGE: No thrombus was identified. DOPPLER: The function was normal with good exit velocities despite ongoing atrial flutter. INTERATRIAL SEPTUM:  An atrial septal aneurysm was noted.  No shunt was noted with color flow doppler or with agitated saline contrast.    RIGHT ATRIUM: Size was normal. No thrombus was identified. MITRAL VALVE: Normal valve structure. There was normal leaflet separation.  No obvious mass, vegetation or thrombus noted. DOPPLER: There was mild non-rheumatic regurgitation.      AORTIC VALVE: The aortic valve was trileaflet. Leaflets exhibited normal cuspal separation without stenosis.  No obvious mass, vegetation or thrombus noted. DOPPLER: There was no aortic valve regurgitation.     TRICUSPID VALVE: Normal valve structure. There was normal leaflet separation. No obvious mass, vegetation or thrombus noted. DOPPLER: There was trace non-rheumatic regurgitation.      PULMONIC VALVE:  No obvious abnormality.      AORTA: Normal root. No dissection, aneurysm, or mobile plaque in visualized portions.  There was mild fixed plaque noted.      PERICARDIUM:  A very small pericardial effusion was noted.  Normal thickness of pericardium.         Preoperative Diagnosis: As above. Postoperative Diagnosis: As above. Procedure: As above. Surgeon(s) and Role: Ruth Ann Rubin MD - Primary    Anesthesia:  General by the anesthesiologist.  Estimated Blood Loss: None. Specimens: * No specimens in log *    Findings: As above. Complications: None.          SUMMARY:  1. No intracardiac thrombus. 2. Normal LV systolic function, EF 79-59%. 3. Mild mitral valve regurgitation.  Non-rheumatic. 4. Trace tricuspid valve regurgitation.  Non-rheumatic. 5.  Atrial septal aneurysm. No intracardiac shunt.   6.  Mild atherosclerotic plaque in the thoracic aorta.          Signed:  Ruth Ann Rubin MD

## 2018-07-06 NOTE — PROCEDURES
[facility]  [address]  [phone]    Patient ID:  Patient: Arin Marrero Sr. MRN: [id number]  Age: 76 y.o.  : 1950  Gender: male  Study Date: 2018    History:  This is a male with persistent atrial flutter that appears to be typical by surface ECG morphology, here for elective EP study and cardiac ablation.      Procedures Performed:   1. Comprehensive EP study with attempted induction of arrhythmia and ablation of SVT mechanism (43503)   2. Left atrial pacing and recording from coronary sinus and left atrium (50350-18)   3. Intracardiac electrophysiologic 3-D mapping (99663)        The patient was brought to EP lab in NPO state and after informed consent.  Continuous ECG and hemodynamic monitoring was performed.  Sedation was by the anesthesiologist who was in constant attendance throughout the procedure.  Right groin was anesthetized with 1% lidocaine and access obtained (2 safe sheaths in the right femoral vein).     An 8Fr sheath on the right was changed to SR-0 long support sheath.  A deflectable duodecapolar catheter was advanced into the coronary sinus and left and right atrial pacing and recordings were obtained.  A roving catheter was positioned in the right atrium, low septal RA, and RV apex as needed during the procedure.  An 8 Fr 8 mm tip deflectable mapping and ablation catheter was used.       A comprehensive EP study performed including both atrial and ventricular burst and extrastimulus pacing.      The [facility] was used for 3D mapping.      At the end of the case, the sheaths were removed and hemostasis was obtained.  No complications.      Preoperative Diagnosis: As above. Postoperative Diagnosis: As above. Procedure:  As above. Surgeon(s) and Role: Lainey West MD - Primary   Anesthesia: [facility]. Estimated Blood Loss:  <5 cc.   Specimens: * No specimens in log *   Findings:  As below. Complications:  None.     X-ray time:  6.4 minutes   Ablation time:  08.1 IVBZLVO with 18 applications      Findings:  1.  At baseline, atrial flutter with cycle length 170 ms was present. Concentric left atrial activation was noted. After all therapy was complete, rhythm was present.  (, , , and  ms).   The AH and HV intervals were 107 and 51 ms, respectively. 2.  Normal sinus node function.  No significant pauses. 3.  Antegrade conduction was midline and decremental without preexcitation.  WBCL 460 ms. Dual AVN physiology was not observed.  The AVNERP pacing at 600 ms was 340 ms and the AERP was 600/<=180 ms. 4.  Retrograde conduction was midline and decremental.  The retrograde WBCL was 500 ms and the VAERP was 600/340 and VERP 600/180 ms.  5.  The atrial flutter had concentric activation along the coronary sinus consistent with typical clockwise right atrial flutter though a faster than typical cycle length was noted. Upon finding rapid conduction in the normal-sized right atrium, it was feasible that this could be a right atrial flutter mechanism. 6.  Using 3D mapping to facilitate precision of ablation therapy, the mapping and ablation catheter was used to ablate in a linear fashion across the cavotricuspid isthmus.  The atrial flutter did not terminate, so cardioversion was performed with 200J biphasic external energy to allow assessment of the ablation line. There was a \"leak\" and bidirectional block was not present. Further ablation therapy was performed and transient bidirectional block was obtained before finally getting bidirectional block across the cavotricuspid isthmus was confirmed. Rapid atrial pacing was performed and atrial flutter was not inducible. 7.  Of note, while ablating in the isthmus prior to the final result, complete heart block was observed and was transient. The ablation catheter was not near the AV node region at the time.   This was observed once and not seen again.      Impression:  1.  Successful ablation for typical right atrial flutter. Bidirectional block across the cavotricuspid isthmus was demonstrated after therapy was complete. 2.  Normal sinus node function. 3.  Normal antegrade conduction without preexcitation or infra-His block.   4.  Normal retrograde conduction was identified.      RECS:  Continue anticoagulation for one month post-ablation and reassess for further therapy.      Signed:  Cathy Garibay MD

## 2018-07-06 NOTE — PROGRESS NOTES
Hospitalist Progress Note    NAME: Joceline King Sr.   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 Per the patient, he has left elbow boggy swelling since couple of years, get infected  Needing antibiotics couple of times    Hx of squamous cell CA on various parts of his body s/p blue light Rx. Heavy Tobacco abuse history   Heavy ETOH abuse history - One pint of whiskey every two days- pased the window for withdrawal    ? Copd with smoking history  Needs PFT as out patient  Follow up with Dr. Daniele Coulter as out patient    + hepatic steatosis, encouraged alcohol cessation, verbalized understanding    haroon  Resolved,   Improving, will monitor  Follow the renal sono with renal cyst ( need follow up as out patient)    TSH borderline high, which was check when he is critically ill, would repeat in next couple of weeks ( would put in discharge instructions)     Body mass index is 28.36 kg/(m^2).  over weight    Code status: full  Prophylaxis: heparin  Recommended Disposition: tbd     Subjective:     Chief Complaint / Reason for Physician Visit  I was told my heart acting funny when walking    Discussed with RN events overnight. Review of Systems:  Symptom Y/N Comments  Symptom Y/N Comments   Fever/Chills n   Chest Pain n    Poor Appetite    Edema     Cough n   Abdominal Pain n    Sputum    Joint Pain     SOB/DANIELS n   Pruritis/Rash     Nausea/vomit    Tolerating PT/OT     Diarrhea    Tolerating Diet     Constipation    Other       Could NOT obtain due to:      Objective:     VITALS:   Last 24hrs VS reviewed since prior progress note. Most recent are:  Patient Vitals for the past 24 hrs:   Temp Pulse Resp BP SpO2   07/06/18 1441 98.3 °F (36.8 °C) 63 18 122/69 99 %   07/06/18 1115 - 82 16 - 98 %   07/06/18 1046 97.9 °F (36.6 °C) 79 16 123/72 98 %   07/06/18 0715 - 72 - - 97 %   07/06/18 0709 98 °F (36.7 °C) 70 18 114/77 97 %   07/06/18 0347 98.1 °F (36.7 °C) 72 16 95/49 98 %   07/05/18 2305 98 °F (36.7 °C) 77 16 106/55 98 %   07/05/18 1900 97.9 °F (36.6 °C) 86 18 127/69 97 %   07/05/18 1800 - 87 20 120/75 97 %   07/05/18 1745 - 93 23 (!) 131/111 98 %   07/05/18 1730 - 84 20 129/67 98 %   07/05/18 1700 - 66 19 117/72 97 %   07/05/18 1645 - 70 19 110/89 97 %   07/05/18 1630 - 80 18 123/72 96 %   07/05/18 1615 - 74 20 124/71 97 %   07/05/18 1600 - 67 18 115/72 95 %       Intake/Output Summary (Last 24 hours) at 07/06/18 1558  Last data filed at 07/06/18 1326   Gross per 24 hour   Intake             1100 ml   Output                0 ml   Net             1100 ml        PHYSICAL EXAM:  General:  Alert,  cooperative, no  acute distress    EENT:  EOMI. Anicteric sclerae. MMM  Resp:  B/l decreased air entry, basal crackles No accessory muscle use  CV:  Regular  rhythm,  No edema  GI:  Soft, Non distended, Non tender.  +Bowel sounds  Neurologic:  Alert and oriented X 3, normal speech,   Psych:   Good insight. Not anxious nor agitated  Skin:  No rashes.   No jaundice, right 2 nd finger tophi,improved    Reviewed most current lab test results and cultures  YES  Reviewed most current radiology test results   YES  Review and summation of old records today    NO  Reviewed patient's current orders and MAR    YES  PMH/SH reviewed - no change compared to H&P  ________________________________________________________________________  Care Plan discussed with:    Comments   Patient y    Family      RN y    Care Manager     Consultant                        Multidiciplinary team rounds were held today with , nursing, pharmacist and clinical coordinator. Patient's plan of care was discussed; medications were reviewed and discharge planning was addressed. ________________________________________________________________________  Total NON critical care TIME: 25 Minutes    Total CRITICAL CARE TIME Spent:   Minutes non procedure based      Comments   >50% of visit spent in counseling and coordination of care     ________________________________________________________________________  Cherylene Keeler, MD     Procedures: see electronic medical records for all procedures/Xrays and details which were not copied into this note but were reviewed prior to creation of Plan. LABS:  I reviewed today's most current labs and imaging studies.   Pertinent labs include:  Recent Labs      07/06/18   1313  07/04/18   0036   WBC  11.5*  8.0   HGB  9.8*  9.6*   HCT  27.9*  27.8*   PLT  152  149*     Recent Labs      07/05/18   0315  07/04/18   0036   NA  143  144   K  4.3  4.3   CL  112*  114*   CO2  22  20*   GLU  131*  84   BUN  23*  29*   CREA  1.09  1.26   CA  8.2*  8.4*       Signed: Cherylene Keeler, MD

## 2018-07-07 VITALS
TEMPERATURE: 98.1 F | HEIGHT: 69 IN | OXYGEN SATURATION: 96 % | SYSTOLIC BLOOD PRESSURE: 120 MMHG | HEART RATE: 71 BPM | WEIGHT: 192.02 LBS | RESPIRATION RATE: 18 BRPM | DIASTOLIC BLOOD PRESSURE: 71 MMHG | BODY MASS INDEX: 28.44 KG/M2

## 2018-07-07 PROCEDURE — 74011636637 HC RX REV CODE- 636/637: Performed by: HOSPITALIST

## 2018-07-07 PROCEDURE — 74011250637 HC RX REV CODE- 250/637: Performed by: HOSPITALIST

## 2018-07-07 PROCEDURE — 74011250637 HC RX REV CODE- 250/637: Performed by: INTERNAL MEDICINE

## 2018-07-07 RX ORDER — ASPIRIN 325 MG/1
100 TABLET, FILM COATED ORAL DAILY
Qty: 30 TAB | Refills: 0 | Status: SHIPPED | OUTPATIENT
Start: 2018-07-08

## 2018-07-07 RX ORDER — PREDNISONE 10 MG/1
20 TABLET ORAL
Qty: 5 TAB | Refills: 0 | OUTPATIENT
Start: 2018-07-08 | End: 2018-07-07

## 2018-07-07 RX ORDER — FOLIC ACID 1 MG/1
1 TABLET ORAL DAILY
Qty: 30 TAB | Refills: 0 | OUTPATIENT
Start: 2018-07-08 | End: 2018-07-07

## 2018-07-07 RX ORDER — FOLIC ACID 1 MG/1
1 TABLET ORAL DAILY
Qty: 30 TAB | Refills: 0 | Status: SHIPPED | OUTPATIENT
Start: 2018-07-08

## 2018-07-07 RX ORDER — PROPRANOLOL HYDROCHLORIDE 10 MG/1
10 TABLET ORAL 2 TIMES DAILY
Qty: 60 TAB | Refills: 0 | OUTPATIENT
Start: 2018-07-07 | End: 2018-07-07

## 2018-07-07 RX ORDER — NICOTINE 7MG/24HR
1 PATCH, TRANSDERMAL 24 HOURS TRANSDERMAL
Qty: 30 PATCH | Refills: 0 | Status: SHIPPED | OUTPATIENT
Start: 2018-07-07 | End: 2018-08-06

## 2018-07-07 RX ORDER — NICOTINE 7MG/24HR
1 PATCH, TRANSDERMAL 24 HOURS TRANSDERMAL
Qty: 30 PATCH | Refills: 0 | OUTPATIENT
Start: 2018-07-07 | End: 2018-07-07

## 2018-07-07 RX ORDER — PREDNISONE 10 MG/1
20 TABLET ORAL
Qty: 5 TAB | Refills: 0 | Status: SHIPPED | OUTPATIENT
Start: 2018-07-08 | End: 2018-08-22

## 2018-07-07 RX ORDER — ASPIRIN 325 MG/1
100 TABLET, FILM COATED ORAL DAILY
Qty: 30 TAB | Refills: 0 | OUTPATIENT
Start: 2018-07-08 | End: 2018-07-07

## 2018-07-07 RX ORDER — THERA TABS 400 MCG
1 TAB ORAL DAILY
Qty: 30 TAB | Refills: 0 | OUTPATIENT
Start: 2018-07-08 | End: 2018-07-07

## 2018-07-07 RX ORDER — PROPRANOLOL HYDROCHLORIDE 10 MG/1
10 TABLET ORAL 2 TIMES DAILY
Qty: 60 TAB | Refills: 0 | Status: SHIPPED | OUTPATIENT
Start: 2018-07-07

## 2018-07-07 RX ADMIN — THERA TABS 1 TABLET: TAB at 08:39

## 2018-07-07 RX ADMIN — Medication 100 MG: at 08:55

## 2018-07-07 RX ADMIN — Medication 10 ML: at 06:34

## 2018-07-07 RX ADMIN — FOLIC ACID 1 MG: 1 TABLET ORAL at 08:38

## 2018-07-07 RX ADMIN — DOXYCYCLINE HYCLATE 100 MG: 100 TABLET, COATED ORAL at 08:39

## 2018-07-07 RX ADMIN — PREDNISONE 20 MG: 20 TABLET ORAL at 08:38

## 2018-07-07 RX ADMIN — AMOXICILLIN AND CLAVULANATE POTASSIUM 1 TABLET: 875; 125 TABLET, FILM COATED ORAL at 08:39

## 2018-07-07 RX ADMIN — APIXABAN 2.5 MG: 2.5 TABLET, FILM COATED ORAL at 08:39

## 2018-07-07 RX ADMIN — COLCHICINE 0.6 MG: 0.6 TABLET, FILM COATED ORAL at 08:55

## 2018-07-07 RX ADMIN — PROPRANOLOL HYDROCHLORIDE 10 MG: 10 TABLET ORAL at 08:38

## 2018-07-07 NOTE — PROGRESS NOTES
Bedside report received from [address] Assessment, Background, Procedure summary, Intake/Output, MAR, and recent results discussed. Care assumed. Discharge instructions reviewed with patient and family. Allowed adequate time to ask questions, all questions answered. Printed copy of AVS given to patient. All belongings gathered, IV and tele discontinued. Transported via wheelchair by volunteer to main entrance and into care of family.

## 2018-07-07 NOTE — DISCHARGE SUMMARY
Hospitalist Discharge Summary     Patient ID:  Louie Arthur  [id number]  76 y.o.  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 Per the patient, he has left elbow boggy swelling since couple of years, get infected Needing antibiotics couple of times  Hx of squamous cell CA on various parts of his body s/p blue light Rx.   Heavy Tobacco abuse history   Heavy ETOH abuse history - One pint of whiskey every two days- pased the window for withdrawal  ? Copd with smoking history  Needs PFT as out patient  Follow up with Dr. Princess Montalvo as out patient  + hepatic steatosis, encouraged alcohol cessation, verbalized understanding  haroon  Resolved,   Improving, will monitor  Follow the renal sono with renal cyst ( need follow up as out patient)  TSH borderline high, which was check when he is critically ill, would repeat in next couple of weeks ( would put in discharge instructions)      CONSULTATIONS:  IP CONSULT TO INTENSIVIST  IP CONSULT TO ORTHOPEDIC SURGERY    Excerpted HPI from H&P of Gianluca Packer MD:  The pt was 'fine ' about two weeks ago. He developed a cough with some feverish episodes for which his PCP prescribed steroids PO, PO doxycycline. The pt states that after taking those medicines for three to four days he developed tremulousness and felt worse. Therefore, he stopped taking those medicines. He continued to feel unwell. During this time he continues with his work and at work about a week ago he felt lightheaded and noticed blind spots and he was unable to move due to acute weakness. He took time off ( I believe) and spent resting at home last few days. He didn't listen to his wife's request and didn't come to ER when he felt unwell last Monday.     ______________________________________________________________________  DISCHARGE SUMMARY/HOSPITAL COURSE:  for full details see H&P, daily progress notes, labs, consult notes. _______________________________________________________________________  Patient seen and examined by me on discharge day. Pertinent Findings:  Gen:    Not in distress  Chest: Clear lungs  CVS:   Regular rhythm. No edema  Abd:  Soft, not distended, not tender  Neuro:  Alert, oriented   _______________________________________________________________________  DISCHARGE MEDICATIONS:   Discharge Medication List as of 7/7/2018 12:57 PM      CONTINUE these medications which have CHANGED    Details   apixaban (ELIQUIS) 2.5 mg tablet Take 1 Tab by mouth two (2) times a day., Print, Disp-60 Tab, R-0      folic acid (FOLVITE) 1 mg tablet Take 1 Tab by mouth daily. , Print, Disp-30 Tab, R-0      nicotine (NICODERM CQ) 7 mg/24 hr 1 Patch by TransDERmal route daily as needed (Tobacco crave) for up to 30 days. , Print, Disp-30 Patch, R-0      predniSONE (DELTASONE) 10 mg tablet Take 2 Tabs by mouth daily (with breakfast). , Print, Disp-5 Tab, R-0      propranolol (INDERAL) 10 mg tablet Take 1 Tab by mouth two (2) times a day., Print, Disp-60 Tab, R-0      Thiamine Mononitrate (B-1) 100 mg tablet Take 1 Tab by mouth daily. , Print, Disp-30 Tab, R-0         CONTINUE these medications which have NOT CHANGED    Details   albuterol (PROAIR HFA) 90 mcg/actuation inhaler Take 2 Puffs by inhalation. , Historical Med      lisinopril (PRINIVIL, ZESTRIL) 10 mg tablet Take 10 mg by mouth daily. , Historical Med      raNITIdine (ZANTAC) 300 mg tablet Take 300 mg by mouth daily. , Historical Med      colchicine (COLCRYS) 0.6 mg tablet Take 0.6 mg by mouth daily. , Historical Med         STOP taking these medications       therapeutic multivitamin (THERAGRAN) tablet Comments:   Reason for Stopping:               My Recommended Diet, Activity, Wound Care, and follow-up labs are listed in the patient's Discharge Insturctions which I have personally completed and reviewed.     ______________________________________________________________________    Risk of deterioration: Moderate    Condition at Discharge:  Stable  ______________________________________________________________________    Disposition  Home with family, no needs    ______________________________________________________________________    Care Plan discussed with:   Patient, Family, RN, Care Manager, Consultant    Comment: none  ______________________________________________________________________    Code Status: Full Code  ___

## 2018-07-07 NOTE — PROGRESS NOTES
Progress Note 7/7/2018 NAME: Marlyse Rinne Sr. MRN:  [id number] Admit Diagnosis: Severe sepsis ([facility] [address] Assessment: 1. Newly-diagnosed persistent atrial flutter with typical RA morphology on ECG, now s/p ablation, IN SINUS. ChadsVasc score 2 (age, aortic plaque). 2.  Asymptomatic nonsustained monomorphic VT. 3.  Sepsis. Improved. 4.  PATRICK, improving. Hyperkalemia, resolved. 5.  Chronic tobacco abuse. 6.  COPD. Mildly-dilated RV with mild hypokinesis on transthoracic echo here. 7.  Tophaceous gout, with acute exacerbation. 8.  Alcohol use. Hepatic parenchymal disease suggested by ultrasound here. PT/INR 11.1/1.1. 9.  Moderate Anemia. 10. Mild thrombocytopenia. Plan: 1. Stopped aspirin. 2.  Will use Eliquis 2.5 mg po bid (reduced dose given anemia, liver disease, mild thrombocytopenia, and good BRIDGET contractile function noted on RENA) x 4 weeks post-ablation, then stop. 3. Continue propranolol IR 10 bid 4. OK for discharge All questions answered for him and his wife. [x]        High complexity decision making was performed Subjective:  
 
Marlyse Rinne Sr. denies chest pain, dyspnea. Ambulatory. Says he feels OK. Discussed with RN events overnight. Patient Active Problem List  
Diagnosis Code  Severe sepsis (HCC) A41.9, R65.20 Review of Systems: 
 
Symptom Y/N Comments  Symptom Y/N Comments Fever/Chills N   Chest Pain N Poor Appetite N   Edema N   
Cough N   Abdominal Pain N Sputum N   Joint Pain Y   
SOB/DANIELS Y   Pruritis/Rash N   
Nausea/vomit N   Tolerating PT/OT Y Diarrhea N   Tolerating Diet Y Constipation N   Other Could NOT obtain due to:   
 
Objective:  
  
Physical Exam: 
 
Last 24hrs VS reviewed since prior progress note. Most recent are: 
 
Visit Vitals  /71 (BP 1 Location: Left arm, BP Patient Position: At rest)  Pulse 70  Temp 98.1 °F (36.7 °C)  Resp 18  Ht 5' 9\" (1.753 m)  Wt 87.1 kg (192 lb 0.3 oz)  SpO2 100%  BMI 28.36 kg/m2 Intake/Output Summary (Last 24 hours) at 07/07/18 1141 Last data filed at 07/06/18 1819 Gross per 24 hour Intake              610 ml Output                0 ml Net              610 ml General Appearance: Well developed, well nourished, alert & oriented x3, no acute distress. Ears/Nose/Mouth/Throat: Hearing grossly normal.  No abnormalities. Neck:  Supple, nontender. Chest: Lungs clear to auscultation bilaterally. Unlabored. Symmetric air movement. Cardiovascular: Regular rate and rhythm, S1S2 normal, no murmur. Abdomen: Soft, non-tender, bowel sounds are active. Extremities: No edema bilaterally. No cyanosis or clubbing.  +tophi. Skin: Warm and dry. No jaundice, petechiae, or purpura. Neuro:  Mild resting tremor in his hands. Awake, appropriate. Grossly nonfocal. 
 
PMH/ reviewed - no change compared to H&P Data Review Telemetry:  SR 70-80's, PACs Lab Data Personally Reviewed: 
 
Recent Labs  
   07/06/18 
 1313 WBC  11.5* HGB  9.8* HCT  27.9*  
PLT  152 Recent Labs  
   07/05/18 
 0315 NA  143  
K  4.3 CL  112* CO2  22 BUN  23* CREA  1.09  
GLU  131* CA  8.2* No results for input(s): PH, PCO2, PO2 in the last 72 hours. Medications Personally Reviewed: 
 
Current Facility-Administered Medications Medication Dose Route Frequency  apixaban (ELIQUIS) tablet 2.5 mg  2.5 mg Oral BID  propranolol (INDERAL) tablet 10 mg  10 mg Oral BID  predniSONE (DELTASONE) tablet 20 mg  20 mg Oral DAILY WITH BREAKFAST  sodium chloride (NS) flush 5-10 mL  5-10 mL IntraVENous Q8H  
 sodium chloride (NS) flush 5-10 mL  5-10 mL IntraVENous PRN  
 doxycycline (VIBRA-TABS) tablet 100 mg  100 mg Oral Q12H  
 amoxicillin-clavulanate (AUGMENTIN) 875-125 mg per tablet 1 Tab  1 Tab Oral Q12H  chlordiazePOXIDE (LIBRIUM) capsule 5 mg  5 mg Oral QID PRN  
 colchicine tablet 0.6 mg  0.6 mg Oral DAILY  Thiamine Mononitrate (B-1) tablet 100 mg  100 mg Oral DAILY  folic acid (FOLVITE) tablet 1 mg  1 mg Oral DAILY  therapeutic multivitamin (THERAGRAN) tablet 1 Tab  1 Tab Oral DAILY  sodium chloride (NS) flush 5-10 mL  5-10 mL IntraVENous Q8H  
 sodium chloride (NS) flush 5-10 mL  5-10 mL IntraVENous PRN  
 acetaminophen (TYLENOL) tablet 500 mg  500 mg Oral Q4H PRN  
 HYDROcodone-acetaminophen (NORCO) 5-325 mg per tablet 1 Tab  1 Tab Oral Q6H PRN  
 naloxone (NARCAN) injection 0.4 mg  0.4 mg IntraVENous PRN  
 ondansetron (ZOFRAN) injection 2 mg  2 mg IntraVENous Q6H PRN  
 bisacodyl (DULCOLAX) tablet 5 mg  5 mg Oral DAILY PRN  
 nicotine (NICODERM CQ) 7 mg/24 hr patch 1 Patch  1 Patch TransDERmal DAILY PRN Jaison Hallman III, DO

## 2018-07-07 NOTE — PROGRESS NOTES
Problem: Falls - Risk of 
Goal: *Absence of Falls Document Redgie Curet Fall Risk and appropriate interventions in the flowsheet. Outcome: Progressing Towards Goal 
Fall Risk Interventions: 
Mobility Interventions: Patient to call before getting OOB Medication Interventions: Assess postural VS orthostatic hypotension Elimination Interventions: Call light in reach History of Falls Interventions: Door open when patient unattended

## 2018-07-07 NOTE — ROUTINE PROCESS
Bedside and Verbal shift change report given to Anisa Cordoba RN (oncoming nurse) by Sharita Jin RN (offgoing nurse). Report included the following information SBAR, Kardex, Intake/Output, MAR, Accordion and Recent Results.

## 2018-08-22 ENCOUNTER — APPOINTMENT (OUTPATIENT)
Dept: GENERAL RADIOLOGY | Age: 68
End: 2018-08-22
Attending: EMERGENCY MEDICINE
Payer: COMMERCIAL

## 2018-08-22 ENCOUNTER — HOSPITAL ENCOUNTER (OUTPATIENT)
Age: 68
Setting detail: OBSERVATION
Discharge: HOME | End: 2018-08-24
Attending: EMERGENCY MEDICINE | Admitting: INTERNAL MEDICINE
Payer: COMMERCIAL

## 2018-08-22 DIAGNOSIS — N17.9 AKI (ACUTE KIDNEY INJURY) (HCC): ICD-10-CM

## 2018-08-22 DIAGNOSIS — E86.0 DEHYDRATION: Primary | ICD-10-CM

## 2018-08-22 LAB
ALBUMIN SERPL-MCNC: 3.8 G/DL (ref 3.5–5)
ALBUMIN/GLOB SERPL: 1 {RATIO} (ref 1.1–2.2)
ALP SERPL-CCNC: 136 U/L (ref 45–117)
ALT SERPL-CCNC: 85 U/L (ref 12–78)
ANION GAP SERPL CALC-SCNC: 10 MMOL/L (ref 5–15)
APPEARANCE UR: CLEAR
AST SERPL-CCNC: 34 U/L (ref 15–37)
BACTERIA URNS QL MICRO: NEGATIVE /HPF
BASOPHILS # BLD: 0.1 K/UL (ref 0–0.1)
BASOPHILS NFR BLD: 1 % (ref 0–1)
BILIRUB SERPL-MCNC: 0.5 MG/DL (ref 0.2–1)
BILIRUB UR QL: NEGATIVE
BUN SERPL-MCNC: 44 MG/DL (ref 6–20)
BUN/CREAT SERPL: 22 (ref 12–20)
CALCIUM SERPL-MCNC: 9.1 MG/DL (ref 8.5–10.1)
CHLORIDE SERPL-SCNC: 107 MMOL/L (ref 97–108)
CO2 SERPL-SCNC: 22 MMOL/L (ref 21–32)
COLOR UR: ABNORMAL
COMMENT, HOLDF: NORMAL
CREAT SERPL-MCNC: 2.04 MG/DL (ref 0.7–1.3)
DIFFERENTIAL METHOD BLD: ABNORMAL
EOSINOPHIL # BLD: 0.6 K/UL (ref 0–0.4)
EOSINOPHIL NFR BLD: 5 % (ref 0–7)
EPITH CASTS URNS QL MICRO: ABNORMAL /LPF
ERYTHROCYTE [DISTWIDTH] IN BLOOD BY AUTOMATED COUNT: 14.2 % (ref 11.5–14.5)
GLOBULIN SER CALC-MCNC: 3.7 G/DL (ref 2–4)
GLUCOSE SERPL-MCNC: 85 MG/DL (ref 65–100)
GLUCOSE UR STRIP.AUTO-MCNC: NEGATIVE MG/DL
HCT VFR BLD AUTO: 39.9 % (ref 36.6–50.3)
HGB BLD-MCNC: 13.7 G/DL (ref 12.1–17)
HGB UR QL STRIP: NEGATIVE
HYALINE CASTS URNS QL MICRO: ABNORMAL /LPF (ref 0–5)
IMM GRANULOCYTES # BLD: 0.1 K/UL (ref 0–0.04)
IMM GRANULOCYTES NFR BLD AUTO: 1 % (ref 0–0.5)
KETONES UR QL STRIP.AUTO: NEGATIVE MG/DL
LACTATE SERPL-SCNC: 0.8 MMOL/L (ref 0.4–2)
LEUKOCYTE ESTERASE UR QL STRIP.AUTO: NEGATIVE
LYMPHOCYTES # BLD: 3.2 K/UL (ref 0.8–3.5)
LYMPHOCYTES NFR BLD: 30 % (ref 12–49)
MCH RBC QN AUTO: 31.8 PG (ref 26–34)
MCHC RBC AUTO-ENTMCNC: 34.3 G/DL (ref 30–36.5)
MCV RBC AUTO: 92.6 FL (ref 80–99)
MONOCYTES # BLD: 0.8 K/UL (ref 0–1)
MONOCYTES NFR BLD: 7 % (ref 5–13)
MUCOUS THREADS URNS QL MICRO: ABNORMAL /LPF
NEUTS SEG # BLD: 5.7 K/UL (ref 1.8–8)
NEUTS SEG NFR BLD: 55 % (ref 32–75)
NITRITE UR QL STRIP.AUTO: NEGATIVE
NRBC # BLD: 0 K/UL (ref 0–0.01)
NRBC BLD-RTO: 0 PER 100 WBC
PH UR STRIP: 5 [PH] (ref 5–8)
PLATELET # BLD AUTO: 162 K/UL (ref 150–400)
PMV BLD AUTO: 10.7 FL (ref 8.9–12.9)
POTASSIUM SERPL-SCNC: 5 MMOL/L (ref 3.5–5.1)
PROT SERPL-MCNC: 7.5 G/DL (ref 6.4–8.2)
PROT UR STRIP-MCNC: NEGATIVE MG/DL
RBC # BLD AUTO: 4.31 M/UL (ref 4.1–5.7)
RBC #/AREA URNS HPF: ABNORMAL /HPF (ref 0–5)
SAMPLES BEING HELD,HOLD: NORMAL
SODIUM SERPL-SCNC: 139 MMOL/L (ref 136–145)
SP GR UR REFRACTOMETRY: 1.02 (ref 1–1.03)
UA: UC IF INDICATED,UAUC: ABNORMAL
URATE SERPL-MCNC: 10.4 MG/DL (ref 3.5–7.2)
UROBILINOGEN UR QL STRIP.AUTO: 0.2 EU/DL (ref 0.2–1)
WBC # BLD AUTO: 10.5 K/UL (ref 4.1–11.1)
WBC URNS QL MICRO: ABNORMAL /HPF (ref 0–4)

## 2018-08-22 PROCEDURE — 71045 X-RAY EXAM CHEST 1 VIEW: CPT

## 2018-08-22 PROCEDURE — 96361 HYDRATE IV INFUSION ADD-ON: CPT

## 2018-08-22 PROCEDURE — 74011250636 HC RX REV CODE- 250/636: Performed by: EMERGENCY MEDICINE

## 2018-08-22 PROCEDURE — 84550 ASSAY OF BLOOD/URIC ACID: CPT | Performed by: EMERGENCY MEDICINE

## 2018-08-22 PROCEDURE — 99285 EMERGENCY DEPT VISIT HI MDM: CPT

## 2018-08-22 PROCEDURE — 96365 THER/PROPH/DIAG IV INF INIT: CPT

## 2018-08-22 PROCEDURE — 87040 BLOOD CULTURE FOR BACTERIA: CPT | Performed by: EMERGENCY MEDICINE

## 2018-08-22 PROCEDURE — 80053 COMPREHEN METABOLIC PANEL: CPT | Performed by: EMERGENCY MEDICINE

## 2018-08-22 PROCEDURE — 36415 COLL VENOUS BLD VENIPUNCTURE: CPT | Performed by: EMERGENCY MEDICINE

## 2018-08-22 PROCEDURE — 96375 TX/PRO/DX INJ NEW DRUG ADDON: CPT

## 2018-08-22 PROCEDURE — 83605 ASSAY OF LACTIC ACID: CPT | Performed by: EMERGENCY MEDICINE

## 2018-08-22 PROCEDURE — 85025 COMPLETE CBC W/AUTO DIFF WBC: CPT | Performed by: EMERGENCY MEDICINE

## 2018-08-22 PROCEDURE — 81001 URINALYSIS AUTO W/SCOPE: CPT | Performed by: EMERGENCY MEDICINE

## 2018-08-22 RX ORDER — HYDROCORTISONE SODIUM SUCCINATE 100 MG/2ML
100 INJECTION, POWDER, FOR SOLUTION INTRAMUSCULAR; INTRAVENOUS
Status: COMPLETED | OUTPATIENT
Start: 2018-08-22 | End: 2018-08-22

## 2018-08-22 RX ORDER — FLUTICASONE FUROATE AND VILANTEROL 100; 25 UG/1; UG/1
1 POWDER RESPIRATORY (INHALATION) DAILY
COMMUNITY

## 2018-08-22 RX ORDER — VANCOMYCIN/0.9 % SOD CHLORIDE 1.5G/250ML
1500 PLASTIC BAG, INJECTION (ML) INTRAVENOUS
Status: COMPLETED | OUTPATIENT
Start: 2018-08-22 | End: 2018-08-23

## 2018-08-22 RX ADMIN — SODIUM CHLORIDE 1000 ML: 900 INJECTION, SOLUTION INTRAVENOUS at 23:49

## 2018-08-22 RX ADMIN — SODIUM CHLORIDE 1000 ML: 900 INJECTION, SOLUTION INTRAVENOUS at 21:49

## 2018-08-22 RX ADMIN — SODIUM CHLORIDE 1000 ML: 900 INJECTION, SOLUTION INTRAVENOUS at 20:32

## 2018-08-22 RX ADMIN — HYDROCORTISONE SODIUM SUCCINATE 100 MG: 100 INJECTION, POWDER, FOR SOLUTION INTRAMUSCULAR; INTRAVENOUS at 23:39

## 2018-08-22 RX ADMIN — VANCOMYCIN HYDROCHLORIDE 1500 MG: 10 INJECTION, POWDER, LYOPHILIZED, FOR SOLUTION INTRAVENOUS at 23:49

## 2018-08-22 NOTE — IP AVS SNAPSHOT
[address] [facility] 
[phone] Patient: Daina Cage Sr. MRN: [id number] LPP:[birth year] You are allergic to the following No active allergies Recent Documentation Height Weight BMI Smoking Status 1.753 m 84.4 kg 27.48 kg/m2 Current Every Day Smoker Unresulted Labs-Please follow up with your PCP about these lab tests Order Current Status CULTURE, BLOOD, PAIRED Preliminary result Emergency Contacts  (Rel.) Home Phone Work Phone Mobile Phone [place] (Spouse) [phone] -- -- About your hospitalization You were admitted on:  August 23, 2018 You last received care in the:  [address] You were discharged on:  August 24, 2018 Why you were hospitalized Your primary diagnosis was:  Arf (Acute Renal Failure) (Hcc) Your diagnoses also included:  Gout, Hypotension, H/O Atrial Flutter, Dehydration Providers Seen During Your Hospitalization Provider Specialty Primary office phone Geeta Yanes. Valentino Granda MD Emergency Medicine [phone] Feliberto Simmons MD Internal Medicine [phone] Your Primary Care Physician (PCP) Primary Care Physician Office Phone Office Fax Earnestine Coronelolivier [phone] Follow-up Information Follow up With Details Comments Contact Info Lars Raymond MD Go on 8/29/2018 Hospital follow-up scheduled at 11:00am ( If you have questions or need to reschedule please call [address] [place] [address] 
[phone] [facility] On 8/25/2018 Expect a phone call from [place] to schedule a follow up visit with you in 24-48 hours. If you have questions please Contact #[phone] Visit at [address] [facility] and  Mikie Bernal have teamed up to make care upon discharge more convenient.  A team of doctors, nurse practitioners and EMTs, that are equipped with all the tools necessary to provide advanced medical care in the comfort of your home. My Medications STOP taking these medications COLCRYS 0.6 mg tablet Generic drug:  colchicine  
   
  
 lisinopril 10 mg tablet Commonly known as:  PRINIVIL ZESTRIL  
   
  
  
TAKE these medications as instructed Instructions Each Dose to Equal  
 Morning Noon Evening Bedtime  
 allopurinol 100 mg tablet Commonly known as:  Noemi Casa Your last dose was: Your next dose is: Take 1 Tab by mouth daily. 100 mg  
    
   
   
   
  
 apixaban 2.5 mg tablet Commonly known as:  Soto Huber Your last dose was: Your next dose is: Take 1 Tab by mouth two (2) times a day. 2.5 mg  
    
   
   
   
  
 BREO ELLIPTA 100-25 mcg/dose inhaler Generic drug:  fluticasone-vilanterol Your last dose was: Your next dose is: Take 1 Puff by inhalation daily. 1 Puff  
    
   
   
   
  
 folic acid 1 mg tablet Commonly known as:  [employer] Your last dose was: Your next dose is: Take 1 Tab by mouth daily. 1 mg  
    
   
   
   
  
 propranolol 10 mg tablet Commonly known as:  INDERAL Your last dose was: Your next dose is: Take 1 Tab by mouth two (2) times a day. 10 mg Thiamine Mononitrate 100 mg tablet Commonly known as:  B-1 Your last dose was: Your next dose is: Take 1 Tab by mouth daily. 100 mg Where to Get Your Medications Information on where to get these meds will be given to you by the nurse or doctor. ! Ask your nurse or doctor about these medications  
  allopurinol 100 mg tablet Discharge Instructions Patient Discharge Instructions Pt Name  Bayron Khanna.  
 Date of Birth 1950 Age  76 y.o. Medical Record Number  [id number] PCP Danette Lam MD   
Admit date:  8/22/2018 @    [place] [address] Room Number  1123/01 Date of Discharge 8/24/2018 Admission Diagnoses:     ARF (acute renal failure) ([facility] No Known Allergies You were admitted to [address] for  ARF (acute renal failure) ([facility] YOUR OTHER MEDICAL DIAGNOSES INCLUDE (BUT NOT LIMITED TO ): 
Present on Admission:  ARF (acute renal failure) ([facility]  Gout  Hypotension  H/O atrial flutter  Dehydration DIET:  Cardiac Diet Recommended activity: Activity as tolerated Follow up : Follow-up Information Follow up With Details Comments Contact Info Danette Lam MD Schedule an appointment as soon as possible for a visit to be seen in one week [address] [place] [address] 
[phone] Stop taking Lisinopril until you follow up with your primary care doctor. · It is important that you take the medication exactly as they are prescribed. · Keep your medication in the bottles provided by the pharmacist and keep a list of the medication names, dosages, and times to be taken in your wallet. · Do not take other medications without consulting your doctor. ADDITIONAL INFORMATION: If you experience any of the following symptoms or have any health problem not listed below, then please call your primary care physician or return to the emergency room if you cannot get hold of your doctor: Fever, chills, nausea, vomiting, diarrhea, change in mentation, falling, bleeding, shortness of breath. I understand that if any problems occur once I am discharged, I am supposed to call my Primary care physician for further care or seek help in the Emergency Department at the nearest Healthcare facility. I have had an opportunity to discuss my clinical issues with my doctor and nursing staff. [employer] Activation Thank you for requesting access to [employer]. Please follow the instructions below to securely access and download your online medical record. [employer] allows you to send messages to your doctor, view your test results, renew your prescriptions, schedule appointments, and more. How Do I Sign Up? 1. In your internet browser, go to [email or web address] 
2. Click on the First Time User? Click Here link in the Sign In box. You will be redirect to the New Member Sign Up page. 3. Enter your [employer] Access Code exactly as it appears below. You will not need to use this code after youve completed the sign-up process. If you do not sign up before the expiration date, you must request a new code. [employer] Access Code: [email or web address] Expires: 2018 10:28 AM (This is the date your [employer] access code will ) 4. Enter the last four digits of your Social Security Number (xxxx) and Date of Birth (mm/dd/yyyy) as indicated and click Submit. You will be taken to the next sign-up page. 5. Create a [employer] ID. This will be your [employer] login ID and cannot be changed, so think of one that is secure and easy to remember. 6. Create a [employer] password. You can change your password at any time. 7. Enter your Password Reset Question and Answer. This can be used at a later time if you forget your password. 8. Enter your e-mail address. You will receive e-mail notification when new information is available in [address] 9. Click Sign Up. You can now view and download portions of your medical record. 10. Click the Download Summary menu link to download a portable copy of your medical information. Additional Information If you have questions, please visit the Frequently Asked Questions section of the [employer] website at [email or web address] Remember, [employer] is NOT to be used for urgent needs. For medical emergencies, dial 911. DISCHARGE SUMMARY from Nurse PATIENT INSTRUCTIONS: 
 
After general anesthesia or intravenous sedation, for 24 hours or while taking prescription Narcotics: · Limit your activities · Do not drive and operate hazardous machinery · Do not make important personal or business decisions · Do  not drink alcoholic beverages · If you have not urinated within 8 hours after discharge, please contact your surgeon on call. Report the following to your surgeon: 
· Excessive pain, swelling, redness or odor of or around the surgical area · Temperature over 100.5 · Nausea and vomiting lasting longer than 4 hours or if unable to take medications · Any signs of decreased circulation or nerve impairment to extremity: change in color, persistent  numbness, tingling, coldness or increase pain · Any questions What to do at Home: *  Please give a list of your current medications to your Primary Care Provider. *  Please update this list whenever your medications are discontinued, doses are 
    changed, or new medications (including over-the-counter products) are added. *  Please carry medication information at all times in case of emergency situations. These are general instructions for a healthy lifestyle: No smoking/ No tobacco products/ Avoid exposure to second hand smoke Surgeon General's Warning:  Quitting smoking now greatly reduces serious risk to your health. Obesity, smoking, and sedentary lifestyle greatly increases your risk for illness A healthy diet, regular physical exercise & weight monitoring are important for maintaining a healthy lifestyle You may be retaining fluid if you have a history of heart failure or if you experience any of the following symptoms:  Weight gain of 3 pounds or more overnight or 5 pounds in a week, increased swelling in our hands or feet or shortness of breath while lying flat in bed.   Please call your doctor as soon as you notice any of these symptoms; do not wait until your next office visit. Recognize signs and symptoms of STROKE: 
 
F-face looks uneven A-arms unable to move or move unevenly S-speech slurred or non-existent T-time-call 911 as soon as signs and symptoms begin-DO NOT go Back to bed or wait to see if you get better-TIME IS BRAIN. Warning Signs of HEART ATTACK Call 911 if you have these symptoms: 
? Chest discomfort. Most heart attacks involve discomfort in the center of the chest that lasts more than a few minutes, or that goes away and comes back. It can feel like uncomfortable pressure, squeezing, fullness, or pain. ? Discomfort in other areas of the upper body. Symptoms can include pain or discomfort in one or both arms, the back, neck, jaw, or stomach. ? Shortness of breath with or without chest discomfort. ? Other signs may include breaking out in a cold sweat, nausea, or lightheadedness. Don't wait more than five minutes to call [address]! Fast action can save your life. Calling 911 is almost always the fastest way to get lifesaving treatment. Emergency Medical Services staff can begin treatment when they arrive  up to an hour sooner than if someone gets to the hospital by car. The discharge information has been reviewed with the patient. The patient verbalized understanding. Discharge medications reviewed with the patient and appropriate educational materials and side effects teaching were provided. ___________________________________________________________________________________________________________________________________ I understand and acknowledge receipt of the above instructions. Physician's or R.N.'s Signature                                                            Date/Time Patient or Representative Signature                                                 Date/Time Discharge Orders None [employer]) Announcement We are excited to announce that we are making your provider's discharge notes available to you in [employer]). You will see these notes when they are completed and signed by the physician that discharged you from your recent hospital stay. If you have any questions or concerns about any information you see in [employer]), please call the Health Information Department where you were seen or reach out to your Primary Care Provider for more information about your plan of care. Introducing [facility]! Elle Fonseca introduces [employer] ([employer]) patient portal. Now you can access parts of your medical record, email your doctor's office, and request medication refills online. 1. In your internet browser, go to [email or web address]://"[employer]". ThinkLink/"[employer]" 2. Click on the First Time User? Click Here link in the Sign In box. You will see the New Member Sign Up page. 3. Enter your [employer]) Access Code exactly as it appears below. You will not need to use this code after youve completed the sign-up process. If you do not sign up before the expiration date, you must request a new code. · [employer]) Access Code: [email or web address] Expires: 9/29/2018 10:28 AM 
 
4. Enter the last four digits of your Social Security Number (xxxx) and Date of Birth (mm/dd/yyyy) as indicated and click Submit. You will be taken to the next sign-up page. 5. Create a [employer]) ID. This will be your [employer]) login ID and cannot be changed, so think of one that is secure and easy to remember. 6. Create a [employer]) password. You can change your password at any time. 7. Enter your Password Reset Question and Answer. This can be used at a later time if you forget your password. 8. Enter your e-mail address. You will receive e-mail notification when new information is available in [address] 9. Click Sign Up. You can now view and download portions of your medical record. 10. Click the Download Summary menu link to download a portable copy of your medical information. If you have questions, please visit the Frequently Asked Questions section of the [employer] website. Remember, [employer] is NOT to be used for urgent needs. For medical emergencies, dial 911. Now available from your iPhone and Android! General Information Please provide this summary of care documentation to your next provider. Patient Signature:  ____________________________________________________________ Date:  ____________________________________________________________  
  
Javier Artist Provider Signature:  ____________________________________________________________ Date:  ____________________________________________________________

## 2018-08-23 PROBLEM — Z86.79 H/O ATRIAL FLUTTER: Status: ACTIVE | Noted: 2018-08-23

## 2018-08-23 PROBLEM — I95.9 HYPOTENSION: Status: ACTIVE | Noted: 2018-08-23

## 2018-08-23 PROBLEM — N17.9 ARF (ACUTE RENAL FAILURE) (HCC): Status: ACTIVE | Noted: 2018-08-23

## 2018-08-23 PROBLEM — M10.9 GOUT: Status: ACTIVE | Noted: 2018-08-23

## 2018-08-23 LAB
ALBUMIN SERPL-MCNC: 3 G/DL (ref 3.5–5)
ALBUMIN/GLOB SERPL: 0.9 {RATIO} (ref 1.1–2.2)
ALP SERPL-CCNC: 96 U/L (ref 45–117)
ALT SERPL-CCNC: 68 U/L (ref 12–78)
ANION GAP SERPL CALC-SCNC: 6 MMOL/L (ref 5–15)
ANION GAP SERPL CALC-SCNC: 8 MMOL/L (ref 5–15)
AST SERPL-CCNC: 26 U/L (ref 15–37)
BASOPHILS # BLD: 0.1 K/UL (ref 0–0.1)
BASOPHILS NFR BLD: 1 % (ref 0–1)
BILIRUB SERPL-MCNC: 0.5 MG/DL (ref 0.2–1)
BUN SERPL-MCNC: 29 MG/DL (ref 6–20)
BUN SERPL-MCNC: 33 MG/DL (ref 6–20)
BUN/CREAT SERPL: 21 (ref 12–20)
BUN/CREAT SERPL: 23 (ref 12–20)
CALCIUM SERPL-MCNC: 7.7 MG/DL (ref 8.5–10.1)
CALCIUM SERPL-MCNC: 8.1 MG/DL (ref 8.5–10.1)
CHLORIDE SERPL-SCNC: 116 MMOL/L (ref 97–108)
CHLORIDE SERPL-SCNC: 118 MMOL/L (ref 97–108)
CO2 SERPL-SCNC: 20 MMOL/L (ref 21–32)
CO2 SERPL-SCNC: 21 MMOL/L (ref 21–32)
CREAT SERPL-MCNC: 1.38 MG/DL (ref 0.7–1.3)
CREAT SERPL-MCNC: 1.44 MG/DL (ref 0.7–1.3)
DIFFERENTIAL METHOD BLD: ABNORMAL
EOSINOPHIL # BLD: 0.2 K/UL (ref 0–0.4)
EOSINOPHIL NFR BLD: 2 % (ref 0–7)
ERYTHROCYTE [DISTWIDTH] IN BLOOD BY AUTOMATED COUNT: 14 % (ref 11.5–14.5)
EST. AVERAGE GLUCOSE BLD GHB EST-MCNC: NORMAL MG/DL
GLOBULIN SER CALC-MCNC: 3.2 G/DL (ref 2–4)
GLUCOSE SERPL-MCNC: 124 MG/DL (ref 65–100)
GLUCOSE SERPL-MCNC: 96 MG/DL (ref 65–100)
HBA1C MFR BLD: 4.6 % (ref 4.2–6.3)
HCT VFR BLD AUTO: 34.9 % (ref 36.6–50.3)
HGB BLD-MCNC: 11.9 G/DL (ref 12.1–17)
IMM GRANULOCYTES # BLD: 0.1 K/UL (ref 0–0.04)
IMM GRANULOCYTES NFR BLD AUTO: 1 % (ref 0–0.5)
LYMPHOCYTES # BLD: 1.7 K/UL (ref 0.8–3.5)
LYMPHOCYTES NFR BLD: 21 % (ref 12–49)
MAGNESIUM SERPL-MCNC: 2 MG/DL (ref 1.6–2.4)
MCH RBC QN AUTO: 32.2 PG (ref 26–34)
MCHC RBC AUTO-ENTMCNC: 34.1 G/DL (ref 30–36.5)
MCV RBC AUTO: 94.6 FL (ref 80–99)
MONOCYTES # BLD: 0.4 K/UL (ref 0–1)
MONOCYTES NFR BLD: 5 % (ref 5–13)
NEUTS SEG # BLD: 5.6 K/UL (ref 1.8–8)
NEUTS SEG NFR BLD: 71 % (ref 32–75)
NRBC # BLD: 0 K/UL (ref 0–0.01)
NRBC BLD-RTO: 0 PER 100 WBC
PLATELET # BLD AUTO: 121 K/UL (ref 150–400)
PMV BLD AUTO: 10.2 FL (ref 8.9–12.9)
POTASSIUM SERPL-SCNC: 4.8 MMOL/L (ref 3.5–5.1)
POTASSIUM SERPL-SCNC: 5.6 MMOL/L (ref 3.5–5.1)
PROT SERPL-MCNC: 6.2 G/DL (ref 6.4–8.2)
RBC # BLD AUTO: 3.69 M/UL (ref 4.1–5.7)
SODIUM SERPL-SCNC: 144 MMOL/L (ref 136–145)
SODIUM SERPL-SCNC: 145 MMOL/L (ref 136–145)
TSH SERPL DL<=0.05 MIU/L-ACNC: 1.2 UIU/ML (ref 0.36–3.74)
WBC # BLD AUTO: 7.9 K/UL (ref 4.1–11.1)

## 2018-08-23 PROCEDURE — 74011250636 HC RX REV CODE- 250/636: Performed by: INTERNAL MEDICINE

## 2018-08-23 PROCEDURE — 99218 HC RM OBSERVATION: CPT

## 2018-08-23 PROCEDURE — 84443 ASSAY THYROID STIM HORMONE: CPT | Performed by: INTERNAL MEDICINE

## 2018-08-23 PROCEDURE — 83735 ASSAY OF MAGNESIUM: CPT | Performed by: INTERNAL MEDICINE

## 2018-08-23 PROCEDURE — 36415 COLL VENOUS BLD VENIPUNCTURE: CPT | Performed by: INTERNAL MEDICINE

## 2018-08-23 PROCEDURE — 74011250637 HC RX REV CODE- 250/637: Performed by: INTERNAL MEDICINE

## 2018-08-23 PROCEDURE — 80053 COMPREHEN METABOLIC PANEL: CPT | Performed by: INTERNAL MEDICINE

## 2018-08-23 PROCEDURE — 65660000000 HC RM CCU STEPDOWN

## 2018-08-23 PROCEDURE — 96366 THER/PROPH/DIAG IV INF ADDON: CPT

## 2018-08-23 PROCEDURE — 74011250636 HC RX REV CODE- 250/636: Performed by: EMERGENCY MEDICINE

## 2018-08-23 PROCEDURE — 85025 COMPLETE CBC W/AUTO DIFF WBC: CPT | Performed by: INTERNAL MEDICINE

## 2018-08-23 PROCEDURE — 83036 HEMOGLOBIN GLYCOSYLATED A1C: CPT | Performed by: INTERNAL MEDICINE

## 2018-08-23 PROCEDURE — 74011636637 HC RX REV CODE- 636/637: Performed by: INTERNAL MEDICINE

## 2018-08-23 RX ORDER — SODIUM POLYSTYRENE SULFONATE 15 G/60ML
30 SUSPENSION ORAL; RECTAL
Status: COMPLETED | OUTPATIENT
Start: 2018-08-23 | End: 2018-08-23

## 2018-08-23 RX ORDER — LABETALOL HYDROCHLORIDE 5 MG/ML
10 INJECTION, SOLUTION INTRAVENOUS
Status: DISCONTINUED | OUTPATIENT
Start: 2018-08-23 | End: 2018-08-24 | Stop reason: HOSPADM

## 2018-08-23 RX ORDER — OXYCODONE HYDROCHLORIDE 5 MG/1
5 TABLET ORAL
Status: DISCONTINUED | OUTPATIENT
Start: 2018-08-23 | End: 2018-08-24 | Stop reason: HOSPADM

## 2018-08-23 RX ORDER — FOLIC ACID 1 MG/1
1 TABLET ORAL DAILY
Status: DISCONTINUED | OUTPATIENT
Start: 2018-08-23 | End: 2018-08-24 | Stop reason: HOSPADM

## 2018-08-23 RX ORDER — ASPIRIN 325 MG/1
100 TABLET, FILM COATED ORAL DAILY
Status: DISCONTINUED | OUTPATIENT
Start: 2018-08-23 | End: 2018-08-24 | Stop reason: HOSPADM

## 2018-08-23 RX ORDER — ACETAMINOPHEN 325 MG/1
650 TABLET ORAL
Status: DISCONTINUED | OUTPATIENT
Start: 2018-08-23 | End: 2018-08-24 | Stop reason: HOSPADM

## 2018-08-23 RX ORDER — FLUTICASONE FUROATE AND VILANTEROL 100; 25 UG/1; UG/1
1 POWDER RESPIRATORY (INHALATION) DAILY
Status: DISCONTINUED | OUTPATIENT
Start: 2018-08-23 | End: 2018-08-24 | Stop reason: HOSPADM

## 2018-08-23 RX ORDER — PREDNISONE 20 MG/1
40 TABLET ORAL
Status: DISCONTINUED | OUTPATIENT
Start: 2018-08-23 | End: 2018-08-24 | Stop reason: HOSPADM

## 2018-08-23 RX ORDER — SODIUM CHLORIDE 9 MG/ML
125 INJECTION, SOLUTION INTRAVENOUS CONTINUOUS
Status: DISCONTINUED | OUTPATIENT
Start: 2018-08-23 | End: 2018-08-24 | Stop reason: HOSPADM

## 2018-08-23 RX ORDER — ONDANSETRON 2 MG/ML
4 INJECTION INTRAMUSCULAR; INTRAVENOUS
Status: DISCONTINUED | OUTPATIENT
Start: 2018-08-23 | End: 2018-08-24 | Stop reason: HOSPADM

## 2018-08-23 RX ADMIN — Medication 100 MG: at 09:01

## 2018-08-23 RX ADMIN — SODIUM CHLORIDE 75 ML/HR: 900 INJECTION, SOLUTION INTRAVENOUS at 08:45

## 2018-08-23 RX ADMIN — FOLIC ACID 1 MG: 1 TABLET ORAL at 09:01

## 2018-08-23 RX ADMIN — SODIUM POLYSTYRENE SULFONATE 30 G: 15 SUSPENSION ORAL; RECTAL at 10:01

## 2018-08-23 RX ADMIN — APIXABAN 2.5 MG: 2.5 TABLET, FILM COATED ORAL at 09:01

## 2018-08-23 RX ADMIN — APIXABAN 2.5 MG: 2.5 TABLET, FILM COATED ORAL at 18:01

## 2018-08-23 RX ADMIN — FLUTICASONE FUROATE AND VILANTEROL TRIFENATATE 1 PUFF: 100; 25 POWDER RESPIRATORY (INHALATION) at 10:00

## 2018-08-23 RX ADMIN — SODIUM CHLORIDE 1000 ML: 900 INJECTION, SOLUTION INTRAVENOUS at 01:17

## 2018-08-23 RX ADMIN — PREDNISONE 40 MG: 20 TABLET ORAL at 09:01

## 2018-08-23 RX ADMIN — SODIUM CHLORIDE 125 ML/HR: 900 INJECTION, SOLUTION INTRAVENOUS at 16:53

## 2018-08-23 NOTE — PROGRESS NOTES
Patient seen and examined. No change to plan as outlined in H&P.     Acute renal failure with hypotension suspect driven by dehydration and mild hyperkalemia  Acute Gouty flare  Atrial flutter    -likely dc today after discussion with PCP    Flaco Green MD

## 2018-08-23 NOTE — ED NOTES
Bedside and Verbal shift change report given to Greta Etienne RN (oncoming nurse) by Eduardo Rios RN (offgoing nurse). Report included the following information SBAR, ED Summary, MAR and Recent Results.

## 2018-08-23 NOTE — ED TRIAGE NOTES
Patient's wife arrived to check on patient & has to go to work & would like to talk with Dr about patient's care

## 2018-08-23 NOTE — ED NOTES
Assumed care of pt. Bedside and Verbal shift change report given to  [facility], RN (oncoming nurse) by Talisha Davies RN (offgoing nurse). Report included the following information SBAR, ED Summary, MAR and Recent Results.

## 2018-08-23 NOTE — ED PROVIDER NOTES
EMERGENCY DEPARTMENT HISTORY AND PHYSICAL EXAM      Date: 8/22/2018  Patient Name: Luellen Canavan.    History of Presenting Illness     Chief Complaint   Patient presents with    Finger Pain     pt arrives ambulatory to triage with finger pain, hx of gout    Fatigue    Back Pain     denies injury/trauma       History Provided By: Patient and Patient's Wife    HPI: Mahnaz Jose Sr., 76 y.o. male with PMHx significant for gout, sepsis, presents ambulatory to the ED with cc of a chronic R index finger pain secondary to gout x 5 days. Wife reports associated symptoms of fatigue, generalized weakness, and back pain, which are all similar to when the pt was in severe septic shock from gout. Wife states the pt was in the ER in July for severe septic shock from gout and was admitted for 1 week and had a cardiac ablation at the time as well. She notes \"since then,\" the pt's gout has not resolved and his PCP has been managing the condition. The pt was rx'd prednisone last week, to which he had felt better. Wife states the gout has gotten better for 2 weeks, but noticed a decline in his health the past 5 days. She notes he routinely showers and shaves daily, but has not been keeping up with this. Pt was noted to have a wet cough and the wife states the pt had saw his pulmonologist (Dr. Alex Wiggins) x 1.5 weeks ago and was cleared. Wife endorses the pt smoking. Pt denies any fevers. Of note, the pt has an appointment with his cardiologist (Dr. Rosaura Hernandez) on 8/29/18. Of note pt was on solucortef during previous hospitalization and tapered off prednisone 2 days ago. There are no other complaints, changes, or physical findings at this time. PCP: Candido Roger MD    Current Outpatient Prescriptions   Medication Sig Dispense Refill    fluticasone-vilanterol (BREO ELLIPTA) 100-25 mcg/dose inhaler Take 1 Puff by inhalation daily.  apixaban (ELIQUIS) 2.5 mg tablet Take 1 Tab by mouth two (2) times a day.  61 Tab 0    folic acid (FOLVITE) 1 mg tablet Take 1 Tab by mouth daily. 30 Tab 0    propranolol (INDERAL) 10 mg tablet Take 1 Tab by mouth two (2) times a day. 60 Tab 0    Thiamine Mononitrate (B-1) 100 mg tablet Take 1 Tab by mouth daily. 30 Tab 0    lisinopril (PRINIVIL, ZESTRIL) 10 mg tablet Take 10 mg by mouth daily.  colchicine (COLCRYS) 0.6 mg tablet Take 0.6 mg by mouth daily. Past History     Past Medical History:  Past Medical History:   Diagnosis Date    Ill-defined condition     gout       Past Surgical History:  History reviewed. No pertinent surgical history. Family History:  History reviewed. No pertinent family history. Social History:  Social History   Substance Use Topics    Smoking status: Current Every Day Smoker     Packs/day: 1.50    Smokeless tobacco: Former User    Alcohol use 6.0 oz/week     10 Shots of liquor per week       Allergies:  No Known Allergies      Review of Systems   Review of Systems   Constitutional: Positive for fatigue. Negative for activity change, appetite change, chills, fever and unexpected weight change. HENT: Negative for congestion. Eyes: Negative for pain and visual disturbance. Respiratory: Positive for cough. Negative for shortness of breath. Cardiovascular: Negative for chest pain. Gastrointestinal: Negative for abdominal pain, diarrhea, nausea and vomiting. Genitourinary: Negative for dysuria. Musculoskeletal: Positive for back pain and myalgias (+R index finger pain). Skin: Negative for rash. Neurological: Positive for weakness (+generalized). Negative for headaches. Physical Exam   Physical Exam   Constitutional: He is oriented to person, place, and time. He appears well-developed and well-nourished. Elderly male in mild to moderate distress, notably hypotensive   HENT:   Head: Normocephalic and atraumatic.    Mouth/Throat: Oropharynx is clear and moist.   Eyes: Conjunctivae and EOM are normal. Pupils are equal, round, and reactive to light. Right eye exhibits no discharge. Left eye exhibits no discharge. Neck: Normal range of motion. Neck supple. Cardiovascular: Normal rate, regular rhythm and normal heart sounds. No murmur heard. Pulmonary/Chest: Effort normal and breath sounds normal. No respiratory distress. He has no wheezes. He has no rales. Abdominal: Soft. Bowel sounds are normal. He exhibits no distension. There is no tenderness. Musculoskeletal: Normal range of motion. He exhibits no edema. Neurological: He is alert and oriented to person, place, and time. No cranial nerve deficit. He exhibits normal muscle tone. Skin: Skin is warm and dry. No rash noted. He is not diaphoretic. R index finger slightly erythematous with evidence of tophi present   Nursing note and vitals reviewed.     Diagnostic Study Results     Labs -  Recent Results (from the past 12 hour(s))   URINALYSIS W/ REFLEX CULTURE    Collection Time: 08/22/18  7:48 PM   Result Value Ref Range    Color YELLOW/STRAW      Appearance CLEAR CLEAR      Specific gravity 1.017 1.003 - 1.030      pH (UA) 5.0 5.0 - 8.0      Protein NEGATIVE  NEG mg/dL    Glucose NEGATIVE  NEG mg/dL    Ketone NEGATIVE  NEG mg/dL    Bilirubin NEGATIVE  NEG      Blood NEGATIVE  NEG      Urobilinogen 0.2 0.2 - 1.0 EU/dL    Nitrites NEGATIVE  NEG      Leukocyte Esterase NEGATIVE  NEG      WBC 0-4 0 - 4 /hpf    RBC 0-5 0 - 5 /hpf    Epithelial cells FEW FEW /lpf    Bacteria NEGATIVE  NEG /hpf    UA:UC IF INDICATED CULTURE NOT INDICATED BY UA RESULT CNI      Mucus TRACE (A) NEG /lpf    Hyaline cast 2-5 0 - 5 /lpf   CBC WITH AUTOMATED DIFF    Collection Time: 08/22/18  7:48 PM   Result Value Ref Range    WBC 10.5 4.1 - 11.1 K/uL    RBC 4.31 4.10 - 5.70 M/uL    HGB 13.7 12.1 - 17.0 g/dL    HCT 39.9 36.6 - 50.3 %    MCV 92.6 80.0 - 99.0 FL    MCH 31.8 26.0 - 34.0 PG    MCHC 34.3 30.0 - 36.5 g/dL    RDW 14.2 11.5 - 14.5 %    PLATELET 725 049 - 417 K/uL    MPV 10.7 8.9 - 12.9 FL    NRBC 0.0 0  WBC    ABSOLUTE NRBC 0.00 0.00 - 0.01 K/uL    NEUTROPHILS 55 32 - 75 %    LYMPHOCYTES 30 12 - 49 %    MONOCYTES 7 5 - 13 %    EOSINOPHILS 5 0 - 7 %    BASOPHILS 1 0 - 1 %    IMMATURE GRANULOCYTES 1 (H) 0.0 - 0.5 %    ABS. NEUTROPHILS 5.7 1.8 - 8.0 K/UL    ABS. LYMPHOCYTES 3.2 0.8 - 3.5 K/UL    ABS. MONOCYTES 0.8 0.0 - 1.0 K/UL    ABS. EOSINOPHILS 0.6 (H) 0.0 - 0.4 K/UL    ABS. BASOPHILS 0.1 0.0 - 0.1 K/UL    ABS. IMM. GRANS. 0.1 (H) 0.00 - 0.04 K/UL    DF AUTOMATED     METABOLIC PANEL, COMPREHENSIVE    Collection Time: 08/22/18  7:48 PM   Result Value Ref Range    Sodium 139 136 - 145 mmol/L    Potassium 5.0 3.5 - 5.1 mmol/L    Chloride 107 97 - 108 mmol/L    CO2 22 21 - 32 mmol/L    Anion gap 10 5 - 15 mmol/L    Glucose 85 65 - 100 mg/dL    BUN 44 (H) 6 - 20 MG/DL    Creatinine 2.04 (H) 0.70 - 1.30 MG/DL    BUN/Creatinine ratio 22 (H) 12 - 20      GFR est AA 40 (L) >60 ml/min/1.73m2    GFR est non-AA 33 (L) >60 ml/min/1.73m2    Calcium 9.1 8.5 - 10.1 MG/DL    Bilirubin, total 0.5 0.2 - 1.0 MG/DL    ALT (SGPT) 85 (H) 12 - 78 U/L    AST (SGOT) 34 15 - 37 U/L    Alk. phosphatase 136 (H) 45 - 117 U/L    Protein, total 7.5 6.4 - 8.2 g/dL    Albumin 3.8 3.5 - 5.0 g/dL    Globulin 3.7 2.0 - 4.0 g/dL    A-G Ratio 1.0 (L) 1.1 - 2.2     SAMPLES BEING HELD    Collection Time: 08/22/18  7:48 PM   Result Value Ref Range    SAMPLES BEING HELD        Add-on orders for these samples will be processed based on acceptable specimen integrity and analyte stability, which may vary by analyte. COMMENT        Add-on orders for these samples will be processed based on acceptable specimen integrity and analyte stability, which may vary by analyte.    URIC ACID    Collection Time: 08/22/18  7:48 PM   Result Value Ref Range    Uric acid 10.4 (H) 3.5 - 7.2 MG/DL   LACTIC ACID    Collection Time: 08/22/18  8:25 PM   Result Value Ref Range    Lactic acid 0.8 0.4 - 2.0 MMOL/L       Radiologic Studies -   CXR Results  (Last 48 hours)               08/22/18 2054  XR CHEST PORT Final result    Impression:  IMPRESSION: No acute findings               Narrative:  EXAM:  XR CHEST PORT       INDICATION:  cough, hypotension       COMPARISON:  7/1/2018       FINDINGS: 2 AP portable radiographs of the chest were obtained at 2034 hours. There is no pneumothorax or pleural effusion. The lungs are clear. Cardiac,   mediastinal and hilar contours are within normal limits. The bones and soft   tissues are grossly within normal limits. Medical Decision Making   I am the first provider for this patient. I reviewed the vital signs, available nursing notes, past medical history, past surgical history, family history and social history. Vital Signs-Reviewed the patient's vital signs. Patient Vitals for the past 12 hrs:   Temp Pulse Resp BP SpO2   08/23/18 0530 - 64 15 104/58 93 %   08/23/18 0500 - 60 14 93/57 94 %   08/23/18 0444 - 64 17 112/66 92 %   08/23/18 0403 - 69 20 103/73 96 %   08/23/18 0303 - 71 16 94/58 94 %   08/23/18 0215 97.5 °F (36.4 °C) 69 16 90/62 93 %   08/23/18 0130 - 70 17 (!) 88/51 93 %   08/23/18 0107 - 71 17 (!) 85/57 93 %   08/22/18 2330 - 66 21 (!) 79/47 95 %   08/22/18 2245 - 67 21 (!) 80/54 96 %   08/22/18 2215 - 68 19 94/54 97 %   08/22/18 2145 - 65 16 (!) 88/52 97 %   08/22/18 2105 - 65 18 (!) 85/51 99 %   08/22/18 2045 - 70 19 (!) 89/53 96 %   08/22/18 2030 - 64 20 (!) 75/45 96 %   08/22/18 2015 - 67 23 92/53 96 %   08/22/18 2000 - 65 21 (!) 87/55 97 %   08/22/18 1919 97.4 °F (36.3 °C) 71 20 117/65 98 %     Pulse Oximetry Analysis - 96% on RA    Cardiac Monitor:   Rate: 63 bpm  Rhythm: Normal Sinus Rhythm     Records Reviewed: Nursing Notes, Old Medical Records, Previous Radiology Studies and Previous Laboratory Studies    Provider Notes (Medical Decision Making):   Vital signs concerning for sepsis. Pt is afebrile. Will initiate hydration while waiting for results.   DDX: sepsis, AI, metabolic derrangement    ED Course:   Initial assessment performed. The patients presenting problems have been discussed, and they are in agreement with the care plan formulated and outlined with them. I have encouraged them to ask questions as they arise throughout their visit. PROGRESS NOTE:  9:28 PM  Pt's blood pressure is borderline low, pt remains awake and alert and oriented. Nursing staff will give another liter of fluids. 22:00 SBP remains low, pt remains alert. Labs without evidence of severe sepsis although PATRICK slight increased from discharge. Continue IVF hydration and will discuss with IM for admission. 23:30 Continues with hypotension with normal mentation. Given significant hypotension on previous admission will resume hydrocortisone and initiate broad spectrum coverage despite normal WBC, temperature and lactate. Await IM call back for admission. 5:00 AM  Was asked to assess pt. MAP has been greater than 65 since 3AM. Do not feel the need for central line or pressors. Dr Annabel Chase    CONSULT NOTE:   6:03 AM  Annabel Chase MD spoke with Dr. Pedro Gao,   Specialty: Hospitalist  Discussed pt's hx, disposition, and available diagnostic and imaging results. Reviewed care plans. Consultant will evaluate for admission. Written by Jeni Tai ED Scribe, as dictated by Annabel Chase MD.    Critical Care Time:   0    Disposition:  PLAN:  1. Admit    ADMIT NOTE:  6:05 AM  Patient is being admitted to the hospital by Dr. Pedro Gao. The results of their tests and reasons for their admission have been discussed with them and/or available family. They convey agreement and understanding for the need to be admitted and for their admission diagnosis. Consultation has been made with the inpatient physician specialist for hospitalization. Diagnosis     Clinical Impression:   1. Dehydration    2. PATRICK (acute kidney injury) ([facility] [address]        Attestations:     This note is prepared by Adeline Renae acting as Scribe for Heidy Ann MD.    Heidy Ann MD: The scribe's documentation has been prepared under my direction and personally reviewed by me in its entirety. I confirm that the note above accurately reflects all work, treatment, procedures, and medical decision making performed by me. This note will not be viewable in [address]

## 2018-08-23 NOTE — PROGRESS NOTES
Oncology Nursing Communication Tool  7:01 PM  8/23/2018     Bedside and Verbal shift change report given to Katty Monahan RN (incoming nurse) by Lin Landers RN (outgoing nurse) on [employer]. . Report included the following information SBAR, Kardex, ED Summary, MAR and Recent Results. Shift Summary: Admission complete. Electrolytes corrected. No complaints of pain or nausea. Good appetite. Ambulates with steady gait. Issues for physician to address: Plan of care         Oncology Shift Note   Admission Date 8/22/2018   Admission Diagnosis ARF (acute renal failure) ([facility] [address]   Code Status Full Code   Consults None      Cardiac Monitoring [] Yes [] No      Purposeful Hourly Rounding [] Yes    Jessica Score Total Score: 1   Jessica score 3 or > [] Bed Alarm [] Avasys [] 1:1 sitter [] Patient refused (Place signed refusal form in chart)      Pain Managed [] Yes [] No    Key Pain Meds     The patient is on no pain meds. Influenza Vaccine Received Flu Vaccine for Current Season (usually Sept-March): No    Patient/Guardian Refused (Notify MD): Yes      Oxygen needs? [] Room air Oxygen @  []1L    []2L    []3L   []4L    []5L   []6L     Use home O2? [] Yes [] No  Perform O2 challenge test using  smartphrase (.oxygenchallenge)      Last bowel movement Last Bowel Movement Date: 08/23/18  bowel movement      Urinary Catheter             LDAs               Peripheral IV 08/22/18 Left Antecubital (Active)   Site Assessment Clean, dry, & intact 8/23/2018  4:08 PM   Phlebitis Assessment 0 8/23/2018  4:08 PM   Infiltration Assessment 0 8/23/2018  4:08 PM   Dressing Status Clean, dry, & intact 8/23/2018  4:08 PM   Dressing Type Tape;Transparent 8/23/2018  4:08 PM   Hub Color/Line Status Pink; Infusing 8/23/2018  4:08 PM                         Readmission Risk Assessment Tool Score Medium Risk            14       Total Score        3 Has Seen PCP in Last 6 Months (Yes=3, No=0)    2 .  Living with Significant Other. Assisted Living. LTAC. SNF. or   Rehab    4 IP Visits Last 12 Months (1-3=4, 4=9, >4=11)    5 Pt.  Coverage (Medicare=5 , Medicaid, or Self-Pay=4)        Criteria that do not apply:    Patient Length of Stay (>5 days = 3)    Charlson Comorbidity Score (Age + Comorbid Conditions)       Expected Length of Stay - - -   Actual Length of Stay 0          Migdalia Nava RN

## 2018-08-23 NOTE — H&P
Hospitalist Admission Note    NAME: Leopoldo Parish Sr.   :  1950   MRN:  [id number]     Date/Time:  2018 7:45 AM    Patient PCP: Clarice Osorio MD  ______________________________________________________________________  Given the patient's current clinical presentation, I have a high level of concern for decompensation if discharged from the emergency department. Complex decision making was performed, which includes reviewing the patient's available past medical records, laboratory results, and x-ray films. My assessment of this patient's clinical condition and my plan of care is as follows. Assessment / Plan:  ARF: likely due to hypotension and dehydration, will hold colchicine, ACEi, c/w IVF and monitor. Hypotension: received 4L IVF in ER, BP had improved, Hold ACEi and BB, use labetalol prn. Acute Gout: will start Prednisone, Uric Acid level is high and on D/c needs to go on Allopurinol. H/O A. Flutter: s/p ablation, c/w Eliquis, monitor in telemetry. Code Status: Full Code  Surrogate Decision Maker: Wife Hermelinda [phone]  DVT Prophylaxis: Eliquis  GI Prophylaxis: not indicated  Baseline: Independent lives with wife      Subjective:   CHIEF COMPLAINT: \"My finger hurts, I feel weak and tired\"    HISTORY OF PRESENT ILLNESS:     Leopoldo Parish is a 76 y.o.  male with PMHx significant for gout, sepsis, presents ambulatory to the ED with cc of a chronic R index finger pain secondary to gout x 5 days. Wife reports associated symptoms of fatigue, generalized weakness, and back pain, which are all similar to when the pt was in severe septic shock from gout. Wife states the pt was in the ER in July for severe septic shock from gout and was admitted for 1 week and had a cardiac ablation at the time as well. She notes \"since then,\" the pt's gout has not resolved and his PCP has been managing the condition.  The pt was rx'd prednisone last week, to which he had felt better. Wife states the gout has gotten better for 2 weeks, but noticed a decline in his health the past 5 days. She notes he routinely showers and shaves daily, but has not been keeping up with this. Pt was noted to have a wet cough and the wife states the pt had saw his pulmonologist (Dr. Ashanti Byrne) x 1.5 weeks ago and was cleared. Wife endorses the pt smoking. Pt denies any fevers. Of note, the pt has an appointment with his cardiologist (Dr. Emanuel Jackson) on 8/29/18. At this time patient is lying in bed c/o pain in right index finger he has tophi there and chalky material is noted in the joint, he c/o weakness, fatigue, tired, back pain, he was found to be in ARF and hypotensive, he denies chest pain, no SOB, no fever, no N/V no diarrhea, no urinary symptoms, no other associated symptoms. We were asked to admit for work up and evaluation of the above problems. Past Medical History:   Diagnosis Date    Atrial flutter ([address]     Hypertension     Ill-defined condition     gout        Past Surgical History:   Procedure Laterality Date    HX APPENDECTOMY      HX OTHER SURGICAL      Cardiac Ablation for A. Flutter 07/2018       Social History   Substance Use Topics    Smoking status: Current Every Day Smoker     Packs/day: 1.50    Smokeless tobacco: Former User    Alcohol use 6.0 oz/week     10 Shots of liquor per week        Family History   Problem Relation Age of Onset    Diabetes Mother     Heart Attack Father      No Known Allergies     Prior to Admission medications    Medication Sig Start Date End Date Taking? Authorizing Provider   fluticasone-vilanterol (BREO ELLIPTA) 100-25 mcg/dose inhaler Take 1 Puff by inhalation daily. Javy Dove, MD   apixaban (ELIQUIS) 2.5 mg tablet Take 1 Tab by mouth two (2) times a day. 7/7/18   Lona Turner MD   folic acid (FOLVITE) 1 mg tablet Take 1 Tab by mouth daily.  7/8/18   Lona Turner MD   propranolol (INDERAL) 10 mg tablet Take 1 Tab by mouth two (2) times a day. 7/7/18   Daniel Boyd MD   Thiamine Mononitrate (B-1) 100 mg tablet Take 1 Tab by mouth daily. 7/8/18   Sunday Awan MD   lisinopril (PRINIVIL, ZESTRIL) 10 mg tablet Take 10 mg by mouth daily. Javy Dove MD   colchicine (COLCRYS) 0.6 mg tablet Take 0.6 mg by mouth daily. Javy Dove MD       REVIEW OF SYSTEMS:     I am not able to complete the review of systems because:    The patient is intubated and sedated    The patient has altered mental status due to his acute medical problems    The patient has baseline aphasia from prior stroke(s)    The patient has baseline dementia and is not reliable historian    The patient is in acute medical distress and unable to provide information           Total of 12 systems reviewed as follows:       POSITIVE= underlined text  Negative = text not underlined  General:  fever, chills, sweats, generalized weakness, weight loss/gain,      loss of appetite   Eyes:    blurred vision, eye pain, loss of vision, double vision  ENT:    rhinorrhea, pharyngitis   Respiratory:   cough, sputum production, SOB, DANIELS, wheezing, pleuritic pain   Cardiology:   chest pain, palpitations, orthopnea, PND, edema, syncope   Gastrointestinal:  abdominal pain , N/V, diarrhea, dysphagia, constipation, bleeding   Genitourinary:  frequency, urgency, dysuria, hematuria, incontinence   Muskuloskeletal :  arthralgia, myalgia, back pain  Hematology:  easy bruising, nose or gum bleeding, lymphadenopathy   Dermatological: rash, ulceration, pruritis, color change / jaundice  Endocrine:   hot flashes or polydipsia   Neurological:  headache, dizziness, confusion, focal weakness, paresthesia,     Speech difficulties, memory loss, gait difficulty  Psychological: Feelings of anxiety, depression, agitation    Objective:   VITALS:    Visit Vitals    /62    Pulse 61    Temp 97.5 °F (36.4 °C)    Resp 14    Ht 5' 9\" (1.753 m)    Wt 84.4 kg (186 lb 1.1 oz)  SpO2 97%    BMI 27.48 kg/m2       PHYSICAL EXAM:    General:    Alert, cooperative, no distress, appears stated age. HEENT: Atraumatic, anicteric sclerae, pink conjunctivae     No oral ulcers, mucosa moist, throat clear, dentition poor  Neck:  Supple, symmetrical,  thyroid: non tender  Lungs:   Coarse BS  Chest wall:  No tenderness  No Accessory muscle use. Heart:   Regular  rhythm,  No  murmur   No edema  Abdomen:   Soft, non-tender. Not distended. Bowel sounds normal  Extremities: No cyanosis. No clubbing,      Skin turgor normal, Capillary refill normal, Radial pulse 2+  Skin:     Not pale. Not Jaundiced  No rashes , tophi in fingers, left elbow  Psych:  Good insight. Not depressed. Not anxious or agitated. Neurologic: EOMs intact. No facial asymmetry. No aphasia or slurred speech. Symmetrical strength, Sensation grossly intact. Alert and oriented X 4.     _______________________________________________________________________  Care Plan discussed with:    Comments   Patient y    Family      RN y    Care Manager                    Consultant:      _______________________________________________________________________  Expected  Disposition:   Home with Family    HH/PT/OT/RN y   SNF/LTC    SAVITA    ________________________________________________________________________  TOTAL TIME:  61 Minutes    Critical Care Provided     Minutes non procedure based      Comments    y Reviewed previous records   >50% of visit spent in counseling and coordination of care y Discussion with patient and/or family and questions answered       ________________________________________________________________________  Signed: Yoni Patel MD    Procedures: see electronic medical records for all procedures/Xrays and details which were not copied into this note but were reviewed prior to creation of Plan.     LAB DATA REVIEWED:    Recent Results (from the past 24 hour(s))   URINALYSIS W/ REFLEX CULTURE    Collection Time: 08/22/18  7:48 PM   Result Value Ref Range    Color YELLOW/STRAW      Appearance CLEAR CLEAR      Specific gravity 1.017 1.003 - 1.030      pH (UA) 5.0 5.0 - 8.0      Protein NEGATIVE  NEG mg/dL    Glucose NEGATIVE  NEG mg/dL    Ketone NEGATIVE  NEG mg/dL    Bilirubin NEGATIVE  NEG      Blood NEGATIVE  NEG      Urobilinogen 0.2 0.2 - 1.0 EU/dL    Nitrites NEGATIVE  NEG      Leukocyte Esterase NEGATIVE  NEG      WBC 0-4 0 - 4 /hpf    RBC 0-5 0 - 5 /hpf    Epithelial cells FEW FEW /lpf    Bacteria NEGATIVE  NEG /hpf    UA:UC IF INDICATED CULTURE NOT INDICATED BY UA RESULT CNI      Mucus TRACE (A) NEG /lpf    Hyaline cast 2-5 0 - 5 /lpf   CBC WITH AUTOMATED DIFF    Collection Time: 08/22/18  7:48 PM   Result Value Ref Range    WBC 10.5 4.1 - 11.1 K/uL    RBC 4.31 4.10 - 5.70 M/uL    HGB 13.7 12.1 - 17.0 g/dL    HCT 39.9 36.6 - 50.3 %    MCV 92.6 80.0 - 99.0 FL    MCH 31.8 26.0 - 34.0 PG    MCHC 34.3 30.0 - 36.5 g/dL    RDW 14.2 11.5 - 14.5 %    PLATELET 518 020 - 660 K/uL    MPV 10.7 8.9 - 12.9 FL    NRBC 0.0 0  WBC    ABSOLUTE NRBC 0.00 0.00 - 0.01 K/uL    NEUTROPHILS 55 32 - 75 %    LYMPHOCYTES 30 12 - 49 %    MONOCYTES 7 5 - 13 %    EOSINOPHILS 5 0 - 7 %    BASOPHILS 1 0 - 1 %    IMMATURE GRANULOCYTES 1 (H) 0.0 - 0.5 %    ABS. NEUTROPHILS 5.7 1.8 - 8.0 K/UL    ABS. LYMPHOCYTES 3.2 0.8 - 3.5 K/UL    ABS. MONOCYTES 0.8 0.0 - 1.0 K/UL    ABS. EOSINOPHILS 0.6 (H) 0.0 - 0.4 K/UL    ABS. BASOPHILS 0.1 0.0 - 0.1 K/UL    ABS. IMM.  GRANS. 0.1 (H) 0.00 - 0.04 K/UL    DF AUTOMATED     METABOLIC PANEL, COMPREHENSIVE    Collection Time: 08/22/18  7:48 PM   Result Value Ref Range    Sodium 139 136 - 145 mmol/L    Potassium 5.0 3.5 - 5.1 mmol/L    Chloride 107 97 - 108 mmol/L    CO2 22 21 - 32 mmol/L    Anion gap 10 5 - 15 mmol/L    Glucose 85 65 - 100 mg/dL    BUN 44 (H) 6 - 20 MG/DL    Creatinine 2.04 (H) 0.70 - 1.30 MG/DL    BUN/Creatinine ratio 22 (H) 12 - 20      GFR est AA 40 (L) >60 ml/min/1.73m2    GFR est non-AA 33 (L) >60 ml/min/1.73m2    Calcium 9.1 8.5 - 10.1 MG/DL    Bilirubin, total 0.5 0.2 - 1.0 MG/DL    ALT (SGPT) 85 (H) 12 - 78 U/L    AST (SGOT) 34 15 - 37 U/L    Alk. phosphatase 136 (H) 45 - 117 U/L    Protein, total 7.5 6.4 - 8.2 g/dL    Albumin 3.8 3.5 - 5.0 g/dL    Globulin 3.7 2.0 - 4.0 g/dL    A-G Ratio 1.0 (L) 1.1 - 2.2     SAMPLES BEING HELD    Collection Time: 08/22/18  7:48 PM   Result Value Ref Range    SAMPLES BEING HELD        Add-on orders for these samples will be processed based on acceptable specimen integrity and analyte stability, which may vary by analyte. COMMENT        Add-on orders for these samples will be processed based on acceptable specimen integrity and analyte stability, which may vary by analyte.    URIC ACID    Collection Time: 08/22/18  7:48 PM   Result Value Ref Range    Uric acid 10.4 (H) 3.5 - 7.2 MG/DL   LACTIC ACID    Collection Time: 08/22/18  8:25 PM   Result Value Ref Range    Lactic acid 0.8 0.4 - 2.0 MMOL/L

## 2018-08-23 NOTE — ED NOTES
Talked with Dr Asa Hussein & he notes no one has ever talked with him concerning this patient's admission.  Charge nurse notified & will talk with Dr Tien Atkinson

## 2018-08-23 NOTE — ED NOTES
Care assumed of patient @ this time & intro with rounding done, with report from [facility] C, RN_________________. Patient resting quietly on stretcher without verbal complaints.

## 2018-08-23 NOTE — PROGRESS NOTES
Problem: Falls - Risk of  Goal: *Absence of Falls  Document Jessica Fall Risk and appropriate interventions in the flowsheet.    Outcome: Progressing Towards Goal  Fall Risk Interventions:            Medication Interventions: Teach patient to arise slowly

## 2018-08-23 NOTE — PROGRESS NOTES
Oncology Interdisciplinary rounds were held today to discuss patient plan of care and outcomes. The following members were present: Nursing, Case Management, Pharmacy and Dietary.     Actual Length of Stay: 0         Expected Length of Stay: - - -                Plan for Day        Mobility        Plan for Stay      Plan for Way   IVF  IV abx  Monitor Finger Up ad kalie IVF  IV abx  Monitor labs Home with family 8/26

## 2018-08-23 NOTE — PROGRESS NOTES
Reason for Admission:   Gout, ? Sepsis (patient has been hospitalized for sepsis in the past), and back pain with acute renal failure                  RRAT Score:     14             Do you (patient/family) have any concerns for transition/discharge? 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 Moderate -     Patient with recent hospitalization for cardiac ablation for a-flutter and previous hospitalization for sepsis due to UTI. Transition of Care Plan:      Patient alert and oriented and able to provide all interview information. Patient reports he lives in private one story home w/ his wife and children. 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 Patient uses [employer]. Patient states his wife will plan to pick him up at discharge. Care Management Interventions  PCP Verified by CM: Yes (PCP - Dr. Kimber Irwin - [phone])  Transition of Care Consult (CM Consult):  Other (Initial CM Assessment)  MyChart Signup: No  Discharge Durable Medical Equipment: No (Patient denies any current DME in the home)  Physical Therapy Consult: Yes  Occupational Therapy Consult: Yes  Speech Therapy Consult: No  Current Support Network: Lives with Spouse, Own Home, Family Lives Nearby  Confirm Follow Up Transport: Family (Patient independent prior to admission, including working and driving)  Plan discussed with Pt/Family/Caregiver: Yes (Patient alert and oriented x 3)  Discharge Location  Discharge Placement: Home (Anticipate home w/ family - no anticipated needs)    Gerber Blake RN, BSN, ACM   - Medical Oncology  [phone]

## 2018-08-23 NOTE — ED NOTES
Three attempts to call report. Nurse unavailable. Instructed that pt sbar complete and transportation is taking pt to room.

## 2018-08-23 NOTE — ED NOTES
Pt ambulated to restroom with no disturbance in gait. Pt on monitors x 3. Pt. Resting comfortably in bed, denies needs at this time. Bed locked and low, call bell in reach.

## 2018-08-23 NOTE — ED NOTES
Pt. sleeping comfortably in bed, denies needs at this time. Bed locked and low, call bell in reach.  WCTM

## 2018-08-24 VITALS
OXYGEN SATURATION: 99 % | BODY MASS INDEX: 27.56 KG/M2 | RESPIRATION RATE: 18 BRPM | TEMPERATURE: 97.6 F | HEIGHT: 69 IN | SYSTOLIC BLOOD PRESSURE: 119 MMHG | WEIGHT: 186.07 LBS | DIASTOLIC BLOOD PRESSURE: 66 MMHG | HEART RATE: 66 BPM

## 2018-08-24 PROBLEM — E86.0 DEHYDRATION: Status: ACTIVE | Noted: 2018-08-24

## 2018-08-24 LAB
ALBUMIN SERPL-MCNC: 2.7 G/DL (ref 3.5–5)
ALBUMIN/GLOB SERPL: 0.9 {RATIO} (ref 1.1–2.2)
ALP SERPL-CCNC: 90 U/L (ref 45–117)
ALT SERPL-CCNC: 56 U/L (ref 12–78)
ANION GAP SERPL CALC-SCNC: 8 MMOL/L (ref 5–15)
AST SERPL-CCNC: 20 U/L (ref 15–37)
BASOPHILS # BLD: 0 K/UL (ref 0–0.1)
BASOPHILS NFR BLD: 1 % (ref 0–1)
BILIRUB SERPL-MCNC: 0.3 MG/DL (ref 0.2–1)
BUN SERPL-MCNC: 24 MG/DL (ref 6–20)
BUN/CREAT SERPL: 23 (ref 12–20)
CALCIUM SERPL-MCNC: 7.8 MG/DL (ref 8.5–10.1)
CHLORIDE SERPL-SCNC: 116 MMOL/L (ref 97–108)
CO2 SERPL-SCNC: 20 MMOL/L (ref 21–32)
CREAT SERPL-MCNC: 1.04 MG/DL (ref 0.7–1.3)
DIFFERENTIAL METHOD BLD: ABNORMAL
EOSINOPHIL # BLD: 0.1 K/UL (ref 0–0.4)
EOSINOPHIL NFR BLD: 2 % (ref 0–7)
ERYTHROCYTE [DISTWIDTH] IN BLOOD BY AUTOMATED COUNT: 13.7 % (ref 11.5–14.5)
GLOBULIN SER CALC-MCNC: 3 G/DL (ref 2–4)
GLUCOSE SERPL-MCNC: 94 MG/DL (ref 65–100)
HCT VFR BLD AUTO: 29.5 % (ref 36.6–50.3)
HGB BLD-MCNC: 10 G/DL (ref 12.1–17)
IMM GRANULOCYTES # BLD: 0.1 K/UL (ref 0–0.04)
IMM GRANULOCYTES NFR BLD AUTO: 1 % (ref 0–0.5)
LYMPHOCYTES # BLD: 1.3 K/UL (ref 0.8–3.5)
LYMPHOCYTES NFR BLD: 17 % (ref 12–49)
MAGNESIUM SERPL-MCNC: 2 MG/DL (ref 1.6–2.4)
MCH RBC QN AUTO: 32.2 PG (ref 26–34)
MCHC RBC AUTO-ENTMCNC: 33.9 G/DL (ref 30–36.5)
MCV RBC AUTO: 94.9 FL (ref 80–99)
MONOCYTES # BLD: 0.5 K/UL (ref 0–1)
MONOCYTES NFR BLD: 6 % (ref 5–13)
NEUTS SEG # BLD: 6 K/UL (ref 1.8–8)
NEUTS SEG NFR BLD: 75 % (ref 32–75)
NRBC # BLD: 0 K/UL (ref 0–0.01)
NRBC BLD-RTO: 0 PER 100 WBC
PLATELET # BLD AUTO: 97 K/UL (ref 150–400)
PMV BLD AUTO: 10.5 FL (ref 8.9–12.9)
POTASSIUM SERPL-SCNC: 4.3 MMOL/L (ref 3.5–5.1)
PROT SERPL-MCNC: 5.7 G/DL (ref 6.4–8.2)
RBC # BLD AUTO: 3.11 M/UL (ref 4.1–5.7)
SODIUM SERPL-SCNC: 144 MMOL/L (ref 136–145)
WBC # BLD AUTO: 8 K/UL (ref 4.1–11.1)

## 2018-08-24 PROCEDURE — G8980 MOBILITY D/C STATUS: HCPCS

## 2018-08-24 PROCEDURE — G8979 MOBILITY GOAL STATUS: HCPCS

## 2018-08-24 PROCEDURE — G8978 MOBILITY CURRENT STATUS: HCPCS

## 2018-08-24 PROCEDURE — 97161 PT EVAL LOW COMPLEX 20 MIN: CPT

## 2018-08-24 PROCEDURE — 74011636637 HC RX REV CODE- 636/637: Performed by: INTERNAL MEDICINE

## 2018-08-24 PROCEDURE — G8988 SELF CARE GOAL STATUS: HCPCS | Performed by: OCCUPATIONAL THERAPIST

## 2018-08-24 PROCEDURE — G8987 SELF CARE CURRENT STATUS: HCPCS | Performed by: OCCUPATIONAL THERAPIST

## 2018-08-24 PROCEDURE — 85025 COMPLETE CBC W/AUTO DIFF WBC: CPT | Performed by: INTERNAL MEDICINE

## 2018-08-24 PROCEDURE — 83735 ASSAY OF MAGNESIUM: CPT | Performed by: INTERNAL MEDICINE

## 2018-08-24 PROCEDURE — 74011250637 HC RX REV CODE- 250/637: Performed by: INTERNAL MEDICINE

## 2018-08-24 PROCEDURE — 99218 HC RM OBSERVATION: CPT

## 2018-08-24 PROCEDURE — 36415 COLL VENOUS BLD VENIPUNCTURE: CPT | Performed by: INTERNAL MEDICINE

## 2018-08-24 PROCEDURE — 97165 OT EVAL LOW COMPLEX 30 MIN: CPT | Performed by: OCCUPATIONAL THERAPIST

## 2018-08-24 PROCEDURE — 80053 COMPREHEN METABOLIC PANEL: CPT | Performed by: INTERNAL MEDICINE

## 2018-08-24 PROCEDURE — 74011250637 HC RX REV CODE- 250/637: Performed by: NURSE PRACTITIONER

## 2018-08-24 PROCEDURE — G8989 SELF CARE D/C STATUS: HCPCS | Performed by: OCCUPATIONAL THERAPIST

## 2018-08-24 RX ORDER — ALLOPURINOL 100 MG/1
100 TABLET ORAL DAILY
Status: DISCONTINUED | OUTPATIENT
Start: 2018-08-24 | End: 2018-08-24 | Stop reason: HOSPADM

## 2018-08-24 RX ORDER — ALLOPURINOL 100 MG/1
100 TABLET ORAL DAILY
Qty: 30 TAB | Refills: 0 | Status: SHIPPED | OUTPATIENT
Start: 2018-08-24

## 2018-08-24 RX ADMIN — ALLOPURINOL 100 MG: 100 TABLET ORAL at 11:34

## 2018-08-24 RX ADMIN — PREDNISONE 40 MG: 20 TABLET ORAL at 09:50

## 2018-08-24 RX ADMIN — APIXABAN 2.5 MG: 2.5 TABLET, FILM COATED ORAL at 09:50

## 2018-08-24 RX ADMIN — FOLIC ACID 1 MG: 1 TABLET ORAL at 09:50

## 2018-08-24 RX ADMIN — Medication 100 MG: at 09:50

## 2018-08-24 RX ADMIN — FLUTICASONE FUROATE AND VILANTEROL TRIFENATATE 1 PUFF: 100; 25 POWDER RESPIRATORY (INHALATION) at 09:51

## 2018-08-24 NOTE — DISCHARGE INSTRUCTIONS
Patient Discharge Instructions     Pt Name  Ghislaine Gregory Sr. Date of Birth 1950   Age  76 y.o. Medical Record Number  [id number]   PCP Kaila Burks MD    Admit date:  8/22/2018 @    [place] [address]    Room Number  1123/01   Date of Discharge 8/24/2018     Admission Diagnoses:     ARF (acute renal failure) ([facility]        No Known Allergies     You were admitted to [address] for  ARF (acute renal failure) ([facility]    YOUR OTHER MEDICAL DIAGNOSES INCLUDE (BUT NOT LIMITED TO ):  Present on Admission:   ARF (acute renal failure) ([facility]   Gout   Hypotension   H/O atrial flutter   Dehydration      DIET:  Cardiac Diet       Recommended activity: Activity as tolerated  Follow up : Follow-up Information     Follow up With Details Comments Vahe Dao MD Schedule an appointment as soon as possible for a visit to be seen in one week [address] [place] [address]  [phone]            Stop taking Lisinopril until you follow up with your primary care doctor. · It is important that you take the medication exactly as they are prescribed. · Keep your medication in the bottles provided by the pharmacist and keep a list of the medication names, dosages, and times to be taken in your wallet. · Do not take other medications without consulting your doctor. ADDITIONAL INFORMATION: If you experience any of the following symptoms or have any health problem not listed below, then please call your primary care physician or return to the emergency room if you cannot get hold of your doctor: Fever, chills, nausea, vomiting, diarrhea, change in mentation, falling, bleeding, shortness of breath. I understand that if any problems occur once I am discharged, I am supposed to call my Primary care physician for further care or seek help in the Emergency Department at the nearest Healthcare facility.      I have had an opportunity to discuss my clinical issues with my doctor and nursing staff. MyChart Activation    Thank you for requesting access to [employer]. Please follow the instructions below to securely access and download your online medical record. [employer] allows you to send messages to your doctor, view your test results, renew your prescriptions, schedule appointments, and more. How Do I Sign Up? 1. In your internet browser, go to [email or web address]  2. Click on the First Time User? Click Here link in the Sign In box. You will be redirect to the New Member Sign Up page. 3. Enter your [employer] Access Code exactly as it appears below. You will not need to use this code after youve completed the sign-up process. If you do not sign up before the expiration date, you must request a new code. [employer] Access Code: [email or web address]  Expires: 2018 10:28 AM (This is the date your [employer] access code will )    4. Enter the last four digits of your Social Security Number (xxxx) and Date of Birth (mm/dd/yyyy) as indicated and click Submit. You will be taken to the next sign-up page. 5. Create a [employer] ID. This will be your [employer] login ID and cannot be changed, so think of one that is secure and easy to remember. 6. Create a [employer] password. You can change your password at any time. 7. Enter your Password Reset Question and Answer. This can be used at a later time if you forget your password. 8. Enter your e-mail address. You will receive e-mail notification when new information is available in [address] 9. Click Sign Up. You can now view and download portions of your medical record. 10. Click the Download Summary menu link to download a portable copy of your medical information. Additional Information    If you have questions, please visit the Frequently Asked Questions section of the [employer] website at [email or web address] Remember, [employer] is NOT to be used for urgent needs.  For medical emergencies, dial 911. DISCHARGE SUMMARY from Nurse    PATIENT INSTRUCTIONS:    After general anesthesia or intravenous sedation, for 24 hours or while taking prescription Narcotics:  · Limit your activities  · Do not drive and operate hazardous machinery  · Do not make important personal or business decisions  · Do  not drink alcoholic beverages  · If you have not urinated within 8 hours after discharge, please contact your surgeon on call. Report the following to your surgeon:  · Excessive pain, swelling, redness or odor of or around the surgical area  · Temperature over 100.5  · Nausea and vomiting lasting longer than 4 hours or if unable to take medications  · Any signs of decreased circulation or nerve impairment to extremity: change in color, persistent  numbness, tingling, coldness or increase pain  · Any questions    What to do at Home:          *  Please give a list of your current medications to your Primary Care Provider. *  Please update this list whenever your medications are discontinued, doses are      changed, or new medications (including over-the-counter products) are added. *  Please carry medication information at all times in case of emergency situations. These are general instructions for a healthy lifestyle:    No smoking/ No tobacco products/ Avoid exposure to second hand smoke  Surgeon General's Warning:  Quitting smoking now greatly reduces serious risk to your health. Obesity, smoking, and sedentary lifestyle greatly increases your risk for illness    A healthy diet, regular physical exercise & weight monitoring are important for maintaining a healthy lifestyle    You may be retaining fluid if you have a history of heart failure or if you experience any of the following symptoms:  Weight gain of 3 pounds or more overnight or 5 pounds in a week, increased swelling in our hands or feet or shortness of breath while lying flat in bed.   Please call your doctor as soon as you notice any of these symptoms; do not wait until your next office visit. Recognize signs and symptoms of STROKE:    F-face looks uneven    A-arms unable to move or move unevenly    S-speech slurred or non-existent    T-time-call 911 as soon as signs and symptoms begin-DO NOT go       Back to bed or wait to see if you get better-TIME IS BRAIN. Warning Signs of HEART ATTACK     Call 911 if you have these symptoms:   Chest discomfort. Most heart attacks involve discomfort in the center of the chest that lasts more than a few minutes, or that goes away and comes back. It can feel like uncomfortable pressure, squeezing, fullness, or pain.  Discomfort in other areas of the upper body. Symptoms can include pain or discomfort in one or both arms, the back, neck, jaw, or stomach.  Shortness of breath with or without chest discomfort.  Other signs may include breaking out in a cold sweat, nausea, or lightheadedness. Don't wait more than five minutes to call 911 - MINUTES MATTER! Fast action can save your life. Calling 911 is almost always the fastest way to get lifesaving treatment. Emergency Medical Services staff can begin treatment when they arrive -- up to an hour sooner than if someone gets to the hospital by car. The discharge information has been reviewed with the patient. The patient verbalized understanding. Discharge medications reviewed with the patient and appropriate educational materials and side effects teaching were provided. ___________________________________________________________________________________________________________________________________        I understand and acknowledge receipt of the above instructions.                                                                                                                                            Physician's or R.N.'s Signature                                                            Date/Time Patient or Representative Signature                                                 Date/Time

## 2018-08-24 NOTE — DISCHARGE SUMMARY
Hospitalist Discharge Summary     Patient ID:  Jl Suero  [id number]  76 y.o.  1950    PCP on record: Marie Rodriguez MD    Admit date: 8/22/2018  Discharge date and time: 8/24/2018      DISCHARGE DIAGNOSIS:  ARF  Dehydration  Hypotension  Acute Gout  H/O A. Flutter    CONSULTATIONS:  None    Excerpted HPI from H&P of Lee Ann Gabriel MD:  Caroline Hernandez is a 76 y.o.  male with PMHx significant for gout, sepsis, presents ambulatory to the ED with cc of a chronic R index finger pain secondary to gout x 5 days. Wife reports associated symptoms of fatigue, generalized weakness, and back pain, which are all similar to when the pt was in severe septic shock from gout. Wife states the pt was in the ER in July for severe septic shock from gout and was admitted for 1 week and had a cardiac ablation at the time as well. She notes \"since then,\" the pt's gout has not resolved and his PCP has been managing the condition. The pt was rx'd prednisone last week, to which he had felt better. Wife states the gout has gotten better for 2 weeks, but noticed a decline in his health the past 5 days. She notes he routinely showers and shaves daily, but has not been keeping up with this. Pt was noted to have a wet cough and the wife states the pt had saw his pulmonologist (Dr. Ciaran Alcantara) x 1.5 weeks ago and was cleared. Wife endorses the pt smoking. Pt denies any fevers. Of note, the pt has an appointment with his cardiologist (Dr. Jeremias Rich) on 8/29/18.    At this time patient is lying in bed c/o pain in right index finger he has tophi there and chalky material is noted in the joint, he c/o weakness, fatigue, tired, back pain, he was found to be in ARF and hypotensive, he denies chest pain, no SOB, no fever, no N/V no diarrhea, no urinary symptoms, no other associated symptoms.     ______________________________________________________________________  DISCHARGE SUMMARY/HOSPITAL COURSE:  for full details see H&P, daily progress notes, labs, consult notes. ARF  - resolved after receiving IV hydration; Creat down to 1.0 from 2.0  - d/c colchicine  - ACEi on hold until follow up with pcp    Hypotension  - resolved    Acute Gout  - full ROM of right hand/fingers. minimal swelling. Denies discomfort.  - Received Prednisone. Uric Acid level 10.4. Continue with daily Allopurinol.  - Advised to follow up wit Rheumatologist as outpatient; provided contact information for Dr. Avni Kay    H/O GASTON Patterson  - s/p ablation, c/w Eliquis  - NSR upon discharge        _______________________________________________________________________  Patient seen and examined by me on discharge day. Pertinent Findings:  Gen:    Not in distress  Chest: Clear lungs  CVS:   Regular rhythm. No edema  Abd:  Soft, not distended, not tender  Neuro:  Alert, orient x 4  _______________________________________________________________________  DISCHARGE MEDICATIONS:   Current Discharge Medication List      START taking these medications    Details   allopurinol (ZYLOPRIM) 100 mg tablet Take 1 Tab by mouth daily. Qty: 30 Tab, Refills: 0         CONTINUE these medications which have NOT CHANGED    Details   fluticasone-vilanterol (BREO ELLIPTA) 100-25 mcg/dose inhaler Take 1 Puff by inhalation daily. apixaban (ELIQUIS) 2.5 mg tablet Take 1 Tab by mouth two (2) times a day. Qty: 60 Tab, Refills: 0      folic acid (FOLVITE) 1 mg tablet Take 1 Tab by mouth daily. Qty: 30 Tab, Refills: 0      propranolol (INDERAL) 10 mg tablet Take 1 Tab by mouth two (2) times a day. Qty: 60 Tab, Refills: 0      Thiamine Mononitrate (B-1) 100 mg tablet Take 1 Tab by mouth daily.   Qty: 30 Tab, Refills: 0         STOP taking these medications       lisinopril (PRINIVIL, ZESTRIL) 10 mg tablet Comments:   Reason for Stopping:         colchicine (COLCRYS) 0.6 mg tablet Comments:   Reason for Stopping:               My Recommended Diet, Activity, Wound Care, and follow-up labs are listed in the patient's Discharge Insturctions which I have personally completed and reviewed. _______________________________________________________________________  DISPOSITION:    Home with Family: y   Home with HH/PT/OT/RN:    SNF/LTC:    SAVITA:    OTHER:        Condition at Discharge:  Stable  _______________________________________________________________________  Follow up with:   PCP : Aline Cole MD  Follow-up Information     Follow up With Details Comments Contact Info    Aline Cole MD Schedule an appointment as soon as possible for a visit to be seen in one week [address] [place] [address] On 8/25/2018 Expect a phone call from [facility] to schedule a follow up visit with you in 24-48 hours. If you have questions please Contact #[phone] Visit at [address] [place] [address] and  [facility] have teamed up to make care upon discharge more convenient. A team of doctors, nurse practitioners and EMTs, that are equipped with all the tools necessary to provide advanced medical care in the comfort of your home.                 Total time in minutes spent coordinating this discharge (includes going over instructions, follow-up, prescriptions, and preparing report for sign off to her PCP) : 30 minutes    Signed:  Beth Nichols NP

## 2018-08-24 NOTE — PROGRESS NOTES
Pt discharged in stable condition. Discharge prescriptions and instructions given and patient and wife also met with the nurse practioner Josiane Amni for further questions. No reports of pain, bleeding or sob at discharge.

## 2018-08-24 NOTE — PROGRESS NOTES
PCP MIRLANDE appt scheduled with  on 8/29/2018 at 11:00am. Appt added to [address] [facility], [place] Specialist will see patient because PCP is out of office next week  PCP MIRLANDE appt scheduled with [place] to see PT in 24-48 hours after discharge at 9:00am. Appt added to [address] [facility], [facility] Hernandez Specialist

## 2018-08-24 NOTE — PROGRESS NOTES
Occupational Therapy EVALUATION/discharge  Patient: Bharti Ramachandran Sr. (77 y.o. male)  Date: 8/24/2018  Primary Diagnosis: ARF (acute renal failure) ([facility])        Precautions: standard       ASSESSMENT:   Based on the objective data described below, the patient presents with near baseline level of functioning for adls and mobility. Pt reports no weakness or decrease in functional ability from his usual baseline. He reports no pain and no concerns re: going home and resuming his adls/IADLs. 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 He reports his family is supportive and able to assist him if needed. No OT is needed at this time. Pt is independent and demonstrates good balance, strength and endurance for adls. .  Further skilled acute occupational therapy is not indicated at this time. Discharge Recommendations: None  Further Equipment Recommendations for Discharge: none      SUBJECTIVE:   Patient stated no, I'll be fine at home.     OBJECTIVE DATA SUMMARY:   HISTORY:   Past Medical History:   Diagnosis Date    Atrial flutter ([address]     Hypertension     Ill-defined condition     gout     Past Surgical History:   Procedure Laterality Date    HX APPENDECTOMY      HX OTHER SURGICAL      Cardiac Ablation for A. Flutter 07/2018       Prior Level of Function/Environment/Context: Independent in adls. Lives with his wife. 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 Independent in adls and IADLs.     Occupations in which the patient is/was successful, what are the barriers preventing that success:   Performance Patterns (routines, roles, habits, and rituals):   Personal Interests and/or values:   Expanded or extensive additional review of patient history: has history of  Gout  And infection/sepsis    Home Situation  Home Environment: Private residence  # Steps to Enter: 2  Rails to Enter: Yes  Hand Rails : Right  One/Two Story Residence: Two story  # of Interior Steps: 15  Interior Rails: Right  Lift Chair Available: No  Living Alone: No  Support Systems: Child(june), Spouse/Significant Other/Partner  Patient Expects to be Discharged to[de-identified] Private residence  Current DME Used/Available at Home: None    Hand dominance: Right    EXAMINATION OF PERFORMANCE DEFICITS:  Cognitive/Behavioral Status:      alert, oriented cooperative                Skin: intact    Edema: R index finger slightly edematous- has had gout flare in that joint    Hearing: Auditory  Auditory Impairment: Hard of hearing, bilateral    Vision/Perceptual:            Functional and intact                         Range of Motion:    AROM: Within functional limits                         Strength:  good  Strength: Within functional limits                Coordination:  Coordination: Within functional limits            Tone & Sensation:    Tone: Normal  Sensation: Intact                      Balance:  Sitting: Intact  Standing: Intact    Functional Mobility and Transfers for ADLs:  Bed Mobility:  Rolling: Independent  Supine to Sit: Independent  Scooting: Independent    Transfers:  Sit to Stand: Independent  Stand to Sit: Independent  Bed to Chair: Independent    ADL Assessment:     indpendent                                       ADL Intervention and task modifications:     Educated on role of OT and OT plan of care. Good balance for  Bending and reaching during ADLs.                                            Functional Measure:  Barthel Index:    Bathin  Bladder: 10  Bowels: 10  Groomin  Dressing: 10  Feeding: 10  Mobility: 15  Stairs: 10  Toilet Use: 10  Transfer (Bed to Chair and Back): 15  Total: 100       Barthel and G-code impairment scale:  Percentage of impairment CH  0% CI  1-19% CJ  20-39% CK  40-59% CL  60-79% CM  80-99% CN  100%   Barthel Score 0-100 100 99-80 79-60 59-40 20-39 1-19   0   Barthel Score 0-20 20 17-19 13-16 9-12 5-8 1-4 0      The Barthel ADL Index: Guidelines  1. The index should be used as a record of what a patient does, not as a record of what a patient could do. 2. The main aim is to establish degree of independence from any help, physical or verbal, however minor and for whatever reason. 3. The need for supervision renders the patient not independent. 4. A patient's performance should be established using the best available evidence. Asking the patient, friends/relatives and nurses are the usual sources, but direct observation and common sense are also important. However direct testing is not needed. 5. Usually the patient's performance over the preceding 24-48 hours is important, but occasionally longer periods will be relevant. 6. Middle categories imply that the patient supplies over 50 per cent of the effort. 7. Use of aids to be independent is allowed. Leanne Weir., Barthel, D.W. ([id number]). Functional evaluation: the Barthel Index. [address] [facility] (14)2. MICHAEL Montenegro Ace, Sharita Méndez., Sharon Leigh., [place], [address] [place] [address] (1999). Measuring the change indisability after inpatient rehabilitation; comparison of the responsiveness of the Barthel Index and Functional [place] Measure. Journal of Neurology, Neurosurgery, and Psychiatry, 66(4), 744-229. Gail Elliott, N.J.A, PATRICK Escalante.DENAE, & Sascha Gutiérrez MJOSE. (2004.) Assessment of post-stroke quality of life in cost-effectiveness studies: The usefulness of the Barthel Index and the EuroQoL-5D. Quality of Life Research, 13, 200-66       G codes: In compliance with CMSs Claims Based Outcome Reporting, the following G-code set was chosen for this patient based on their primary functional limitation being treated: The outcome measure chosen to determine the severity of the functional limitation was the Barthel Index with a score of 100/100 which was correlated with the impairment scale. ?  Self Care:     - CURRENT STATUS: CH - 0% impaired, limited or restricted    - GOAL STATUS: CH - 0% impaired, limited or restricted    - D/C STATUS:  CH - 0% impaired, limited or restricted     Occupational Therapy Evaluation Charge Determination   History Examination Decision-Making   LOW Complexity : Brief history review  MEDIUM Complexity : 3-5 performance deficits relating to physical, cognitive , or psychosocial skils that result in activity limitations and / or participation restrictions MEDIUM Complexity : Patient may present with comorbidities that affect occupational performnce. Miniml to moderate modification of tasks or assistance (eg, physical or verbal ) with assesment(s) is necessary to enable patient to complete evaluation       Based on the above components, the patient evaluation is determined to be of the following complexity level: LOW   Pain:  Pain Scale 1: Numeric (0 - 10)  Pain Intensity 1: 0              Activity Tolerance:   Good. Pt is up ad kalie in his room    After treatment:   []  Patient left in no apparent distress sitting up in chair  [x]  Patient left in no apparent distress in bed seated at EOB  [x]  Call bell left within reach  [x]  Nursing notified  []  Caregiver present  []  Bed alarm activated    COMMUNICATION/EDUCATION:   Communication/Collaboration:  [x]      Home safety education was provided and the patient/caregiver indicated understanding. [x]      Patient/family have participated as able and agree with findings and recommendations. []      Patient is unable to participate in plan of care at this time.   Findings and recommendations were discussed with: Physical Therapist and Registered Nurse    SUHAIL Pimentel/L  Time Calculation: 10 mins

## 2018-08-24 NOTE — PROGRESS NOTES
physical Therapy EVALUATION/DISCHARGE  Patient: Erlin Matos Sr. (77 y.o. male)  Date: 8/24/2018  Primary Diagnosis: ARF (acute renal failure) ([facility])        Precautions:      ASSESSMENT :  Based on the objective data described below, the patient presents with good strength, intact balance, and overall baseline independent functional mobility. Pt received seated EOB, agreeable to participation with therapy. Pt independently performed all aspects of functional mobility including bed mobility, sit<>stand transfers, ambulation of 200ft, and stair climbing. Gait steady and stable overall with no LOB/balance deficits noted. Gait stability remained intact as pt ascended/descended 12 steps w/ R handrail use, again with no difficulty. Balance remained intact as pt retrieve item from the ground independently. At this time, pt is functioning at his baseline independent level and has no further skilled therapy needs. Pt is safe to be up ad kalie within room/hallways. Skilled physical therapy is not indicated at this time. PLAN :  Discharge Recommendations: None  Further Equipment Recommendations for Discharge: None     SUBJECTIVE:   Patient stated I have gout in my fingers.     OBJECTIVE DATA SUMMARY:   HISTORY:    Past Medical History:   Diagnosis Date    Atrial flutter ([address]     Hypertension     Ill-defined condition     gout     Past Surgical History:   Procedure Laterality Date    HX APPENDECTOMY      HX OTHER SURGICAL      Cardiac Ablation for A. Flutter 07/2018     Prior Level of Function/Home Situation: Independent w/ ambulation and ADLs. Still driving and working full-time. 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 Lives with wife and son.    Personal factors and/or comorbidities impacting plan of care:     Home Situation  Home Environment: Private residence  # Steps to Enter: 2  Rails to Enter: Yes  Hand Rails : Right  One/Two Story Residence: Two story  # of Interior Steps: 13  Interior Rails: Right  Lift Chair Available: No  Living Alone: No  Support Systems: Child(june), Spouse/Significant Other/Partner  Patient Expects to be Discharged to[de-identified] Private residence  Current DME Used/Available at Home: None    EXAMINATION/PRESENTATION/DECISION MAKING:   Critical Behavior:              Hearing: Auditory  Auditory Impairment: Hard of hearing, bilateral  Skin:  intact  Edema: none noted   Range Of Motion:  AROM: Within functional limits                       Strength:    Strength: Within functional limits                    Tone & Sensation:   Tone: Normal              Sensation: Intact               Coordination:  Coordination: Within functional limits  Vision:      Functional Mobility:  Bed Mobility:  Rolling: Independent  Supine to Sit: Independent     Scooting: Independent  Transfers:  Sit to Stand: Independent  Stand to Sit: Independent        Bed to Chair: Independent              Balance:   Sitting: Intact  Standing: Intact  Ambulation/Gait Training:  Distance (ft): 200 Feet (ft)  Assistive Device: Gait belt  Ambulation - Level of Assistance: Independent                                                    Stairs:  Number of Stairs Trained: 12  Stairs - Level of Assistance: Independent   Rail Use: Right        Functional Measure:  Barthel Index:    Bathin  Bladder: 10  Bowels: 10  Groomin  Dressing: 10  Feeding: 10  Mobility: 15  Stairs: 10  Toilet Use: 10  Transfer (Bed to Chair and Back): 15  Total: 100       Barthel and G-code impairment scale:  Percentage of impairment CH  0% CI  1-19% CJ  20-39% CK  40-59% CL  60-79% CM  80-99% CN  100%   Barthel Score 0-100 100 99-80 79-60 59-40 20-39 1-19   0   Barthel Score 0-20 20 17-19 13-16 9-12 5-8 1-4 0      The Barthel ADL Index: Guidelines  1. The index should be used as a record of what a patient does, not as a record of what a patient could do.   2. The main aim is to establish degree of independence from any help, physical or verbal, however minor and for whatever reason. 3. The need for supervision renders the patient not independent. 4. A patient's performance should be established using the best available evidence. Asking the patient, friends/relatives and nurses are the usual sources, but direct observation and common sense are also important. However direct testing is not needed. 5. Usually the patient's performance over the preceding 24-48 hours is important, but occasionally longer periods will be relevant. 6. Middle categories imply that the patient supplies over 50 per cent of the effort. 7. Use of aids to be independent is allowed. Bridger Andrade., Barthel, D.W. ([id number]). Functional evaluation: the Barthel Index. [address] [facility] (14)2. Sandria Cogan valery MICHAEL Monroy, Cherelle Unger, Micki Langley, [place], [address] [facility] (1999). Measuring the change indisability after inpatient rehabilitation; comparison of the responsiveness of the Barthel Index and Functional [place] Measure. Journal of Neurology, Neurosurgery, and Psychiatry, 66(4), 553-669. Dejuan Davis, N.J.A, JERRI Escalante, & Durga Yancey, M.A. (2004.) Assessment of post-stroke quality of life in cost-effectiveness studies: The usefulness of the Barthel Index and the EuroQoL-5D. Quality of Life Research, 13, 832-81       G codes: In compliance with CMSs Claims Based Outcome Reporting, the following G-code set was chosen for this patient based on their primary functional limitation being treated: The outcome measure chosen to determine the severity of the functional limitation was the Barthel Index with a score of 100/100 which was correlated with the impairment scale.     ? Mobility - Walking and Moving Around:     - CURRENT STATUS: CH - 0% impaired, limited or restricted    - GOAL STATUS: CH - 0% impaired, limited or restricted    - D/C STATUS:  CH - 0% impaired, limited or restricted        Physical Therapy Evaluation Charge Determination   History Examination Presentation Decision-Making   MEDIUM  Complexity : 1-2 comorbidities / personal factors will impact the outcome/ POC  MEDIUM Complexity : 3 Standardized tests and measures addressing body structure, function, activity limitation and / or participation in recreation  MEDIUM Complexity : Evolving with changing characteristics  MEDIUM Complexity : FOTO score of 26-74      Based on the above components, the patient evaluation is determined to be of the following complexity level: MEDIUM    Pain:  Pain Scale 1: Numeric (0 - 10)  Pain Intensity 1: 0     Activity Tolerance:   VSS  Please refer to the flowsheet for vital signs taken during this treatment. After treatment:   []   Patient left in no apparent distress sitting up in chair  [x]   Patient left in no apparent distress in bed  [x]   Call bell left within reach  [x]   Nursing notified  []   Caregiver present  []   Bed alarm activated    COMMUNICATION/EDUCATION:   Communication/Collaboration:  [x]   Fall prevention education was provided and the patient/caregiver indicated understanding. [x]   Patient/family have participated as able and agree with findings and recommendations. []   Patient is unable to participate in plan of care at this time.   Findings and recommendations were discussed with: Occupational Therapist and Registered Nurse    Thank you for this referral.  Luz Marina Crane, PT, DPT   Time Calculation: 9 mins

## 2018-08-28 LAB
BACTERIA SPEC CULT: ABNORMAL
SERVICE CMNT-IMP: ABNORMAL

## 2021-03-17 ENCOUNTER — APPOINTMENT (OUTPATIENT)
Dept: CT IMAGING | Age: 71
End: 2021-03-17
Attending: EMERGENCY MEDICINE
Payer: COMMERCIAL

## 2021-03-17 ENCOUNTER — APPOINTMENT (OUTPATIENT)
Dept: GENERAL RADIOLOGY | Age: 71
End: 2021-03-17
Attending: EMERGENCY MEDICINE
Payer: COMMERCIAL

## 2021-03-17 ENCOUNTER — HOSPITAL ENCOUNTER (EMERGENCY)
Age: 71
Discharge: LEFT BEFORE DISCHARGE | End: 2021-03-17
Attending: EMERGENCY MEDICINE
Payer: COMMERCIAL

## 2021-03-17 VITALS
RESPIRATION RATE: 14 BRPM | WEIGHT: 175 LBS | OXYGEN SATURATION: 100 % | DIASTOLIC BLOOD PRESSURE: 81 MMHG | HEART RATE: 90 BPM | HEIGHT: 70 IN | SYSTOLIC BLOOD PRESSURE: 145 MMHG | TEMPERATURE: 98.4 F | BODY MASS INDEX: 25.05 KG/M2

## 2021-03-17 DIAGNOSIS — K85.90 ACUTE PANCREATITIS, UNSPECIFIED COMPLICATION STATUS, UNSPECIFIED PANCREATITIS TYPE: Primary | ICD-10-CM

## 2021-03-17 DIAGNOSIS — M10.9 GOUTY ARTHRITIS: ICD-10-CM

## 2021-03-17 LAB
ALBUMIN SERPL-MCNC: 3.2 G/DL (ref 3.5–5)
ALBUMIN/GLOB SERPL: 0.8 {RATIO} (ref 1.1–2.2)
ALP SERPL-CCNC: 156 U/L (ref 45–117)
ALT SERPL-CCNC: 22 U/L (ref 12–78)
ANION GAP SERPL CALC-SCNC: 6 MMOL/L (ref 5–15)
AST SERPL-CCNC: 15 U/L (ref 15–37)
ATRIAL RATE: 208 BPM
BASOPHILS # BLD: 0.1 K/UL (ref 0–0.1)
BASOPHILS NFR BLD: 1 % (ref 0–1)
BILIRUB SERPL-MCNC: 0.8 MG/DL (ref 0.2–1)
BUN SERPL-MCNC: 18 MG/DL (ref 6–20)
BUN/CREAT SERPL: 14 (ref 12–20)
CALCIUM SERPL-MCNC: 8.6 MG/DL (ref 8.5–10.1)
CALCULATED R AXIS, ECG10: 34 DEGREES
CALCULATED T AXIS, ECG11: 17 DEGREES
CHLORIDE SERPL-SCNC: 106 MMOL/L (ref 97–108)
CO2 SERPL-SCNC: 26 MMOL/L (ref 21–32)
CREAT SERPL-MCNC: 1.33 MG/DL (ref 0.7–1.3)
DIAGNOSIS, 93000: NORMAL
DIFFERENTIAL METHOD BLD: ABNORMAL
EOSINOPHIL # BLD: 0.6 K/UL (ref 0–0.4)
EOSINOPHIL NFR BLD: 6 % (ref 0–7)
ERYTHROCYTE [DISTWIDTH] IN BLOOD BY AUTOMATED COUNT: 14.1 % (ref 11.5–14.5)
GLOBULIN SER CALC-MCNC: 4.2 G/DL (ref 2–4)
GLUCOSE SERPL-MCNC: 95 MG/DL (ref 65–100)
HCT VFR BLD AUTO: 48.4 % (ref 36.6–50.3)
HGB BLD-MCNC: 16.7 G/DL (ref 12.1–17)
IMM GRANULOCYTES # BLD AUTO: 0.1 K/UL (ref 0–0.04)
IMM GRANULOCYTES NFR BLD AUTO: 1 % (ref 0–0.5)
LIPASE SERPL-CCNC: 2131 U/L (ref 73–393)
LYMPHOCYTES # BLD: 1.9 K/UL (ref 0.8–3.5)
LYMPHOCYTES NFR BLD: 17 % (ref 12–49)
MCH RBC QN AUTO: 31.5 PG (ref 26–34)
MCHC RBC AUTO-ENTMCNC: 34.5 G/DL (ref 30–36.5)
MCV RBC AUTO: 91.3 FL (ref 80–99)
MONOCYTES # BLD: 0.9 K/UL (ref 0–1)
MONOCYTES NFR BLD: 8 % (ref 5–13)
NEUTS SEG # BLD: 7.3 K/UL (ref 1.8–8)
NEUTS SEG NFR BLD: 67 % (ref 32–75)
NRBC # BLD: 0 K/UL (ref 0–0.01)
NRBC BLD-RTO: 0 PER 100 WBC
PLATELET # BLD AUTO: 154 K/UL (ref 150–400)
PMV BLD AUTO: 10.7 FL (ref 8.9–12.9)
POTASSIUM SERPL-SCNC: 4 MMOL/L (ref 3.5–5.1)
PROT SERPL-MCNC: 7.4 G/DL (ref 6.4–8.2)
Q-T INTERVAL, ECG07: 354 MS
QRS DURATION, ECG06: 90 MS
QTC CALCULATION (BEZET), ECG08: 437 MS
RBC # BLD AUTO: 5.3 M/UL (ref 4.1–5.7)
SODIUM SERPL-SCNC: 138 MMOL/L (ref 136–145)
TROPONIN I SERPL-MCNC: <0.05 NG/ML
VENTRICULAR RATE, ECG03: 92 BPM
WBC # BLD AUTO: 10.8 K/UL (ref 4.1–11.1)

## 2021-03-17 PROCEDURE — 93005 ELECTROCARDIOGRAM TRACING: CPT

## 2021-03-17 PROCEDURE — 83690 ASSAY OF LIPASE: CPT

## 2021-03-17 PROCEDURE — 96375 TX/PRO/DX INJ NEW DRUG ADDON: CPT

## 2021-03-17 PROCEDURE — 80053 COMPREHEN METABOLIC PANEL: CPT

## 2021-03-17 PROCEDURE — 85025 COMPLETE CBC W/AUTO DIFF WBC: CPT

## 2021-03-17 PROCEDURE — 74177 CT ABD & PELVIS W/CONTRAST: CPT

## 2021-03-17 PROCEDURE — 74011250636 HC RX REV CODE- 250/636: Performed by: EMERGENCY MEDICINE

## 2021-03-17 PROCEDURE — 96374 THER/PROPH/DIAG INJ IV PUSH: CPT

## 2021-03-17 PROCEDURE — 71046 X-RAY EXAM CHEST 2 VIEWS: CPT

## 2021-03-17 PROCEDURE — 74011000636 HC RX REV CODE- 636: Performed by: EMERGENCY MEDICINE

## 2021-03-17 PROCEDURE — 99284 EMERGENCY DEPT VISIT MOD MDM: CPT

## 2021-03-17 PROCEDURE — 84484 ASSAY OF TROPONIN QUANT: CPT

## 2021-03-17 PROCEDURE — 36415 COLL VENOUS BLD VENIPUNCTURE: CPT

## 2021-03-17 RX ORDER — MORPHINE SULFATE 2 MG/ML
2 INJECTION, SOLUTION INTRAMUSCULAR; INTRAVENOUS
Status: COMPLETED | OUTPATIENT
Start: 2021-03-17 | End: 2021-03-17

## 2021-03-17 RX ORDER — COLCHICINE 0.6 MG/1
0.6 TABLET ORAL DAILY
COMMUNITY

## 2021-03-17 RX ADMIN — IOPAMIDOL 100 ML: 755 INJECTION, SOLUTION INTRAVENOUS at 17:36

## 2021-03-17 RX ADMIN — MORPHINE SULFATE 2 MG: 2 INJECTION, SOLUTION INTRAMUSCULAR; INTRAVENOUS at 20:10

## 2021-03-17 RX ADMIN — METHYLPREDNISOLONE SODIUM SUCCINATE 125 MG: 125 INJECTION, POWDER, FOR SOLUTION INTRAMUSCULAR; INTRAVENOUS at 17:25

## 2021-03-17 RX ADMIN — SODIUM CHLORIDE 1000 ML: 9 INJECTION, SOLUTION INTRAVENOUS at 17:25

## 2021-03-17 NOTE — ED PROVIDER NOTES
EMERGENCY DEPARTMENT HISTORY AND PHYSICAL EXAM      Date: 3/17/2021  Patient Name: Saintclair Council.    History of Presenting Illness     Chief Complaint   Patient presents with    Gout     gout flare up in fingers, reports when it is this severe he becomes septic    Chest Pain     reports pain in his rib cage x the last few days and hes also been feeling very lethargic       History Provided By: Patient    HPI: Debora Richardson Sr., 70 y.o. male with PMHx significant for atrial flutter, hypertension, gout, stage IV melanoma on immunotherapy who presents with a chief complaint of generalized weakness, abdominal pain, as well as a gout flare. Patient reports he has had worsening pain in his fingers for last several days consistent with prior gout. Does take colchicine on a daily basis. Wife states that in the past when he has had bad gout flares he has become septic. Patient denies any chest pain or shortness of breath. Reports generalized sharp, abdominal discomfort without vomiting or diarrhea. Reports he recently had a CT scan that showed \"clot in his spleen. \"      PCP: Maciej Copeland MD    There are no other complaints, changes, or physical findings at this time. Current Outpatient Medications   Medication Sig Dispense Refill    colchicine 0.6 mg tablet Take 0.6 mg by mouth daily.  allopurinol (ZYLOPRIM) 100 mg tablet Take 1 Tab by mouth daily. 30 Tab 0    fluticasone-vilanterol (BREO ELLIPTA) 100-25 mcg/dose inhaler Take 1 Puff by inhalation daily.  folic acid (FOLVITE) 1 mg tablet Take 1 Tab by mouth daily. 30 Tab 0    propranolol (INDERAL) 10 mg tablet Take 1 Tab by mouth two (2) times a day. 60 Tab 0    Thiamine Mononitrate (B-1) 100 mg tablet Take 1 Tab by mouth daily.  30 Tab 0     Past History     Past Medical History:  Past Medical History:   Diagnosis Date    Atrial flutter ([address]     Hypertension     Ill-defined condition     gout     Past Surgical History:  Past Surgical History:   Procedure Laterality Date    HX APPENDECTOMY      HX OTHER SURGICAL      Cardiac Ablation for A. Flutter 07/2018     Family History:  Family History   Problem Relation Age of Onset    Diabetes Mother     Heart Attack Father      Social History:  Social History     Tobacco Use    Smoking status: Current Every Day Smoker     Packs/day: 1.50    Smokeless tobacco: Former User   Substance Use Topics    Alcohol use: Yes     Alcohol/week: 10.0 standard drinks     Types: 10 Shots of liquor per week    Drug use: No     Allergies:  No Known Allergies  Review of Systems   Review of Systems   Constitutional: Negative for chills and fever. HENT: Negative for congestion, rhinorrhea and sore throat. Respiratory: Negative for cough and shortness of breath. Cardiovascular: Negative for chest pain. Gastrointestinal: Positive for abdominal pain. Negative for nausea and vomiting. Genitourinary: Negative for dysuria and urgency. Musculoskeletal: Positive for arthralgias and joint swelling. Skin: Negative for rash. Neurological: Negative for dizziness, light-headedness and headaches. All other systems reviewed and are negative. Physical Exam   Physical Exam  Vitals signs and nursing note reviewed. Constitutional:       General: He is not in acute distress. Appearance: He is well-developed. HENT:      Head: Normocephalic and atraumatic. Eyes:      Conjunctiva/sclera: Conjunctivae normal.      Pupils: Pupils are equal, round, and reactive to light. Neck:      Musculoskeletal: Normal range of motion. Cardiovascular:      Rate and Rhythm: Normal rate and regular rhythm. Pulmonary:      Effort: Pulmonary effort is normal. No respiratory distress. Breath sounds: Normal breath sounds. No stridor. Abdominal:      General: There is no distension. Palpations: Abdomen is soft. Tenderness: There is abdominal tenderness in the epigastric area.    Musculoskeletal: Normal range of motion. Comments: Left hand there is tenderness over the DIP of the index finger and PIP of the middle finger   Skin:     General: Skin is warm and dry. Neurological:      Mental Status: He is alert and oriented to person, place, and time. Diagnostic Study Results   Labs -     Recent Results (from the past 12 hour(s))   EKG, 12 LEAD, INITIAL    Collection Time: 03/17/21  2:51 PM   Result Value Ref Range    Ventricular Rate 92 BPM    Atrial Rate 208 BPM    QRS Duration 90 ms    Q-T Interval 354 ms    QTC Calculation (Bezet) 437 ms    Calculated R Axis 34 degrees    Calculated T Axis 17 degrees    Diagnosis       Atrial fibrillation  Nonspecific ST abnormality  When compared with ECG of 01-JUL-2018 10:36,  Atrial fibrillation has replaced Atrial flutter  Nonspecific T wave abnormality no longer evident in Anterolateral leads  Confirmed by Jannie Ratliff ([id number]) on 3/17/2021 8:53:31 PM     CBC WITH AUTOMATED DIFF    Collection Time: 03/17/21  3:02 PM   Result Value Ref Range    WBC 10.8 4.1 - 11.1 K/uL    RBC 5.30 4. 10 - 5.70 M/uL    HGB 16.7 12.1 - 17.0 g/dL    HCT 48.4 36.6 - 50.3 %    MCV 91.3 80.0 - 99.0 FL    MCH 31.5 26.0 - 34.0 PG    MCHC 34.5 30.0 - 36.5 g/dL    RDW 14.1 11.5 - 14.5 %    PLATELET 194 432 - 834 K/uL    MPV 10.7 8.9 - 12.9 FL    NRBC 0.0 0  WBC    ABSOLUTE NRBC 0.00 0.00 - 0.01 K/uL    NEUTROPHILS 67 32 - 75 %    LYMPHOCYTES 17 12 - 49 %    MONOCYTES 8 5 - 13 %    EOSINOPHILS 6 0 - 7 %    BASOPHILS 1 0 - 1 %    IMMATURE GRANULOCYTES 1 (H) 0.0 - 0.5 %    ABS. NEUTROPHILS 7.3 1.8 - 8.0 K/UL    ABS. LYMPHOCYTES 1.9 0.8 - 3.5 K/UL    ABS. MONOCYTES 0.9 0.0 - 1.0 K/UL    ABS. EOSINOPHILS 0.6 (H) 0.0 - 0.4 K/UL    ABS. BASOPHILS 0.1 0.0 - 0.1 K/UL    ABS. IMM.  GRANS. 0.1 (H) 0.00 - 0.04 K/UL    DF AUTOMATED     METABOLIC PANEL, COMPREHENSIVE    Collection Time: 03/17/21  3:02 PM   Result Value Ref Range    Sodium 138 136 - 145 mmol/L    Potassium 4.0 3.5 - 5.1 mmol/L    Chloride 106 97 - 108 mmol/L    CO2 26 21 - 32 mmol/L    Anion gap 6 5 - 15 mmol/L    Glucose 95 65 - 100 mg/dL    BUN 18 6 - 20 MG/DL    Creatinine 1.33 (H) 0.70 - 1.30 MG/DL    BUN/Creatinine ratio 14 12 - 20      GFR est AA >60 >60 ml/min/1.73m2    GFR est non-AA 53 (L) >60 ml/min/1.73m2    Calcium 8.6 8.5 - 10.1 MG/DL    Bilirubin, total 0.8 0.2 - 1.0 MG/DL    ALT (SGPT) 22 12 - 78 U/L    AST (SGOT) 15 15 - 37 U/L    Alk. phosphatase 156 (H) 45 - 117 U/L    Protein, total 7.4 6.4 - 8.2 g/dL    Albumin 3.2 (L) 3.5 - 5.0 g/dL    Globulin 4.2 (H) 2.0 - 4.0 g/dL    A-G Ratio 0.8 (L) 1.1 - 2.2     TROPONIN I    Collection Time: 03/17/21  3:02 PM   Result Value Ref Range    Troponin-I, Qt. <0.05 <0.05 ng/mL   LIPASE    Collection Time: 03/17/21  3:02 PM   Result Value Ref Range    Lipase 2,131 (H) 73 - 393 U/L       Radiologic Studies -   CT ABD PELV W CONT   Final Result      1. Acute pancreatitis without complicating feature or ductal dilatation. 2. 3 small pancreatic cysts, likely representing IPMN's. 3. Prominent peripancreatic lymph nodes, the largest 1.5 cm short axis diameter,   likely associated with pancreatic inflammation. .   4. Other incidental findings, including mild hepatic steatosis and bilateral   nonobstructing intrarenal calculi. .      XR CHEST PA LAT   Final Result    Bibasilar interstitial prominence without focal infiltrate. .  . At least   partially healed left rib fracture. Correlate with clinical history for trauma. Xr Chest Pa Lat    Result Date: 3/17/2021   Bibasilar interstitial prominence without focal infiltrate. .  . At least partially healed left rib fracture. Correlate with clinical history for trauma. Ct Abd Pelv W Cont    Result Date: 3/17/2021  1. Acute pancreatitis without complicating feature or ductal dilatation. 2. 3 small pancreatic cysts, likely representing IPMN's.  3. Prominent peripancreatic lymph nodes, the largest 1.5 cm short axis diameter, likely associated with pancreatic inflammation. . 4. Other incidental findings, including mild hepatic steatosis and bilateral nonobstructing intrarenal calculi. .    Medical Decision Making   I am the first provider for this patient. I reviewed the vital signs, available nursing notes, past medical history, past surgical history, family history and social history. Vital Signs-Reviewed the patient's vital signs. Patient Vitals for the past 12 hrs:   Temp Pulse Resp BP SpO2   03/17/21 1445 98.4 °F (36.9 °C) 90 14 (!) 145/81 100 %       Pulse Oximetry Analysis - 100% on ra    Cardiac Monitor:   Rate: 90 bpm  Rhythm: Atrial fibrillation    ED EKG interpretation:  Rhythm: atrial fib; and irregular. Rate (approx.): 92; Axis: normal; P wave: Absent; QRS interval: normal ; ST/T wave: normal; Other findings: borderline ekg. This EKG was interpreted by ZEUS Bentley MD,ED Provider. Records Reviewed: Nursing Notes and Old Medical Records    Provider Notes (Medical Decision Making):   Patient presents with multiple complaints including generalized fatigue, abdominal pain, as well as finger pain. Exam of fingers consistent with acute gout flare. In terms of abdominal pain differential includes worsening pain related to underlying malignancy, splenic infarct, pancreatitis, GERD. Will check basic lab work, lipase, CT abdomen, troponin, EKG. ED Course:   Initial assessment performed. The patients presenting problems have been discussed, and they are in agreement with the care plan formulated and outlined with them. I have encouraged them to ask questions as they arise throughout their visit. Work-up notable for acute pancreatitis. Patient is feeling better and is tolerated p.o. but still feels quite dehydrated. Discussed options for an observation admission which she would like to proceed with. I discussed with Dr. Armando Castellanos, hospitalist, who will see the patient for admission.     ED Course as of Mar 18 0106   Wed Mar 17, 2021 4306 Nursing reports that the patient asked to have his IV removed and ambulated out of the ED    [DARLING]      ED Course User Index  Karuna Mcfadden MD       Procedures:  Procedures    Critical Care:  none    Disposition:  Eloped after admission    Diagnosis     Clinical Impression:   1. Acute pancreatitis, unspecified complication status, unspecified pancreatitis type    2. Gouty arthritis            Please note that this dictation was completed with [email or web address], the computer voice recognition software. Quite often unanticipated grammatical, syntax, homophones, and other interpretive errors are inadvertently transcribed by the computer software. Please disregard these errors.   Please excuse any errors that have escaped final proofreading

## 2021-03-17 NOTE — ED NOTES
Assumed care of pt from triage. Pt is A&O x 4. Pt reports CC of gout flare up in left hand finger for 2 weeks. Pt Wife reports last time pt had an gout flare up was in septic shock. Pt Wife report pt has stage 4 melanoma cancer and was seen by his oncologist two weeks ago where an CT was performed a clot on his spleen was found. Pt reports having severe mid epigastric pain w/ no relief of s/s. Pt is place on the monitor x 3, VSS at this time. [address] at bedside evaluating pt     1729 Pt off to CT     1900 Pt resting in stretcher in POC, call bell within reach, wife remains at bedside . Cristin Monge     2010 Pt tolerate PO Challenge well notified MD     3/18/21 1124 this nurse access chart to update note

## 2021-03-18 NOTE — ED NOTES
Pt ask for his peripheral  IV to be removed and then  Left the ED w/ his wife, after being admitted to the hospital  W/o seeing the hospitalist.

## 2021-05-17 ENCOUNTER — APPOINTMENT (OUTPATIENT)
Dept: GENERAL RADIOLOGY | Age: 71
DRG: 064 | End: 2021-05-17
Attending: EMERGENCY MEDICINE
Payer: MEDICARE

## 2021-05-17 ENCOUNTER — HOSPITAL ENCOUNTER (INPATIENT)
Age: 71
LOS: 5 days | Discharge: HOME | DRG: 064 | End: 2021-05-22
Attending: EMERGENCY MEDICINE | Admitting: INTERNAL MEDICINE
Payer: MEDICARE

## 2021-05-17 ENCOUNTER — APPOINTMENT (OUTPATIENT)
Dept: CT IMAGING | Age: 71
DRG: 064 | End: 2021-05-17
Attending: INTERNAL MEDICINE
Payer: MEDICARE

## 2021-05-17 ENCOUNTER — APPOINTMENT (OUTPATIENT)
Dept: NON INVASIVE DIAGNOSTICS | Age: 71
DRG: 064 | End: 2021-05-17
Attending: INTERNAL MEDICINE
Payer: MEDICARE

## 2021-05-17 DIAGNOSIS — I48.20 CHRONIC ATRIAL FIBRILLATION (HCC): ICD-10-CM

## 2021-05-17 DIAGNOSIS — K85.20 ALCOHOL-INDUCED ACUTE PANCREATITIS, UNSPECIFIED COMPLICATION STATUS: ICD-10-CM

## 2021-05-17 DIAGNOSIS — R10.84 ABDOMINAL PAIN, GENERALIZED: ICD-10-CM

## 2021-05-17 DIAGNOSIS — I48.91 ATRIAL FIBRILLATION, UNSPECIFIED TYPE (HCC): ICD-10-CM

## 2021-05-17 DIAGNOSIS — I65.23 BILATERAL CAROTID ARTERY STENOSIS: ICD-10-CM

## 2021-05-17 DIAGNOSIS — I63.9 CEREBROVASCULAR ACCIDENT (CVA), UNSPECIFIED MECHANISM (HCC): Primary | ICD-10-CM

## 2021-05-17 DIAGNOSIS — I63.411 CEREBROVASCULAR ACCIDENT (CVA) DUE TO EMBOLISM OF RIGHT MIDDLE CEREBRAL ARTERY (HCC): ICD-10-CM

## 2021-05-17 DIAGNOSIS — I63.9 ACUTE CVA (CEREBROVASCULAR ACCIDENT) (HCC): ICD-10-CM

## 2021-05-17 PROBLEM — K85.90 ACUTE PANCREATITIS: Status: ACTIVE | Noted: 2021-05-17

## 2021-05-17 PROBLEM — R10.9 ABDOMINAL PAIN: Status: ACTIVE | Noted: 2021-05-17

## 2021-05-17 LAB
ALBUMIN SERPL-MCNC: 2.7 G/DL (ref 3.5–5)
ALBUMIN/GLOB SERPL: 0.6 {RATIO} (ref 1.1–2.2)
ALP SERPL-CCNC: 126 U/L (ref 45–117)
ALT SERPL-CCNC: 28 U/L (ref 12–78)
ANION GAP SERPL CALC-SCNC: 6 MMOL/L (ref 5–15)
APPEARANCE UR: CLEAR
AST SERPL-CCNC: 16 U/L (ref 15–37)
ATRIAL RATE: 104 BPM
BACTERIA URNS QL MICRO: NEGATIVE /HPF
BASOPHILS # BLD: 0.2 K/UL (ref 0–0.1)
BASOPHILS NFR BLD: 1 % (ref 0–1)
BILIRUB SERPL-MCNC: 0.6 MG/DL (ref 0.2–1)
BILIRUB UR QL: NEGATIVE
BUN SERPL-MCNC: 25 MG/DL (ref 6–20)
BUN/CREAT SERPL: 21 (ref 12–20)
CALCIUM SERPL-MCNC: 8.8 MG/DL (ref 8.5–10.1)
CALCULATED R AXIS, ECG10: 47 DEGREES
CALCULATED T AXIS, ECG11: 22 DEGREES
CHLORIDE SERPL-SCNC: 103 MMOL/L (ref 97–108)
CK MB CFR SERPL CALC: ABNORMAL % (ref 0–2.5)
CK MB SERPL-MCNC: <1 NG/ML (ref 5–25)
CK SERPL-CCNC: 29 U/L (ref 39–308)
CO2 SERPL-SCNC: 27 MMOL/L (ref 21–32)
COLOR UR: ABNORMAL
CREAT SERPL-MCNC: 1.2 MG/DL (ref 0.7–1.3)
DIAGNOSIS, 93000: NORMAL
DIFFERENTIAL METHOD BLD: ABNORMAL
ECHO EST RA PRESSURE: 10 MMHG
ECHO LA AREA 4C: 16.16 CM2
ECHO LA TO AORTIC ROOT RATIO: 0.92
ECHO LA VOL 4C: 44.59 ML (ref 18–58)
ECHO LA VOLUME INDEX A4C: 22.84 ML/M2 (ref 16–28)
ECHO LV EDV A4C: 74.32 ML
ECHO LV EDV INDEX A4C: 38.1 ML/M2
ECHO LV EJECTION FRACTION A4C: 29 PERCENT
ECHO LV ESV A4C: 52.4 ML
ECHO LV ESV INDEX A4C: 26.8 ML/M2
ECHO LV INTERNAL DIMENSION DIASTOLIC: 5.36 CM (ref 4.2–5.9)
ECHO LV INTERNAL DIMENSION SYSTOLIC: 4.26 CM
ECHO LV IVSD: 1.08 CM (ref 0.6–1)
ECHO LV MASS 2D: 230.5 G (ref 88–224)
ECHO LV MASS INDEX 2D: 118.1 G/M2 (ref 49–115)
ECHO LV POSTERIOR WALL DIASTOLIC: 1.11 CM (ref 0.6–1)
ECHO LVOT DIAM: 2.08 CM
ECHO LVOT PEAK GRADIENT: 2.77 MMHG
ECHO LVOT PEAK VELOCITY: 83.15 CM/S
ECHO PV PEAK INSTANTANEOUS GRADIENT SYSTOLIC: 1.51 MMHG
ECHO PV REGURGITANT MAX VELOCITY: 61.37 CM/S
ECHO RIGHT VENTRICULAR SYSTOLIC PRESSURE (RVSP): 25.99 MMHG
ECHO RV INTERNAL DIMENSION: 3.45 CM
ECHO TV REGURGITANT MAX VELOCITY: 199.92 CM/S
ECHO TV REGURGITANT PEAK GRADIENT: 15.99 MMHG
EOSINOPHIL # BLD: 0.2 K/UL (ref 0–0.4)
EOSINOPHIL NFR BLD: 1 % (ref 0–7)
EPITH CASTS URNS QL MICRO: ABNORMAL /LPF
ERYTHROCYTE [DISTWIDTH] IN BLOOD BY AUTOMATED COUNT: 13.2 % (ref 11.5–14.5)
EST. AVERAGE GLUCOSE BLD GHB EST-MCNC: 100 MG/DL
GLOBULIN SER CALC-MCNC: 4.2 G/DL (ref 2–4)
GLUCOSE SERPL-MCNC: 90 MG/DL (ref 65–100)
GLUCOSE UR STRIP.AUTO-MCNC: NEGATIVE MG/DL
HBA1C MFR BLD: 5.1 % (ref 4–5.6)
HCT VFR BLD AUTO: 52.5 % (ref 36.6–50.3)
HGB BLD-MCNC: 17.6 G/DL (ref 12.1–17)
HGB UR QL STRIP: NEGATIVE
IMM GRANULOCYTES # BLD AUTO: 0.6 K/UL (ref 0–0.04)
IMM GRANULOCYTES NFR BLD AUTO: 3 % (ref 0–0.5)
KETONES UR QL STRIP.AUTO: NEGATIVE MG/DL
LACTATE BLD-SCNC: 0.92 MMOL/L (ref 0.4–2)
LEUKOCYTE ESTERASE UR QL STRIP.AUTO: NEGATIVE
LIPASE SERPL-CCNC: 485 U/L (ref 73–393)
LYMPHOCYTES # BLD: 1.7 K/UL (ref 0.8–3.5)
LYMPHOCYTES NFR BLD: 9 % (ref 12–49)
MCH RBC QN AUTO: 30.8 PG (ref 26–34)
MCHC RBC AUTO-ENTMCNC: 33.5 G/DL (ref 30–36.5)
MCV RBC AUTO: 91.9 FL (ref 80–99)
MONOCYTES # BLD: 1.1 K/UL (ref 0–1)
MONOCYTES NFR BLD: 6 % (ref 5–13)
NEUTS SEG # BLD: 14.8 K/UL (ref 1.8–8)
NEUTS SEG NFR BLD: 80 % (ref 32–75)
NITRITE UR QL STRIP.AUTO: NEGATIVE
NRBC # BLD: 0 K/UL (ref 0–0.01)
NRBC BLD-RTO: 0 PER 100 WBC
PH UR STRIP: 5 [PH] (ref 5–8)
PLATELET # BLD AUTO: 228 K/UL (ref 150–400)
PMV BLD AUTO: 10.3 FL (ref 8.9–12.9)
POTASSIUM SERPL-SCNC: 4.8 MMOL/L (ref 3.5–5.1)
PROT SERPL-MCNC: 6.9 G/DL (ref 6.4–8.2)
PROT UR STRIP-MCNC: ABNORMAL MG/DL
Q-T INTERVAL, ECG07: 350 MS
QRS DURATION, ECG06: 82 MS
QTC CALCULATION (BEZET), ECG08: 456 MS
RBC # BLD AUTO: 5.71 M/UL (ref 4.1–5.7)
RBC #/AREA URNS HPF: ABNORMAL /HPF (ref 0–5)
RBC MORPH BLD: ABNORMAL
SODIUM SERPL-SCNC: 136 MMOL/L (ref 136–145)
SP GR UR REFRACTOMETRY: 1.01 (ref 1–1.03)
TROPONIN I SERPL-MCNC: <0.05 NG/ML
UA: UC IF INDICATED,UAUC: ABNORMAL
UROBILINOGEN UR QL STRIP.AUTO: 1 EU/DL (ref 0.2–1)
VENTRICULAR RATE, ECG03: 102 BPM
WBC # BLD AUTO: 18.6 K/UL (ref 4.1–11.1)
WBC URNS QL MICRO: ABNORMAL /HPF (ref 0–4)

## 2021-05-17 PROCEDURE — 83690 ASSAY OF LIPASE: CPT

## 2021-05-17 PROCEDURE — 92523 SPEECH SOUND LANG COMPREHEN: CPT

## 2021-05-17 PROCEDURE — 99223 1ST HOSP IP/OBS HIGH 75: CPT | Performed by: PSYCHIATRY & NEUROLOGY

## 2021-05-17 PROCEDURE — 74011250636 HC RX REV CODE- 250/636: Performed by: INTERNAL MEDICINE

## 2021-05-17 PROCEDURE — 65660000000 HC RM CCU STEPDOWN

## 2021-05-17 PROCEDURE — 84484 ASSAY OF TROPONIN QUANT: CPT

## 2021-05-17 PROCEDURE — 74011000250 HC RX REV CODE- 250: Performed by: INTERNAL MEDICINE

## 2021-05-17 PROCEDURE — 74011250637 HC RX REV CODE- 250/637: Performed by: INTERNAL MEDICINE

## 2021-05-17 PROCEDURE — 36415 COLL VENOUS BLD VENIPUNCTURE: CPT

## 2021-05-17 PROCEDURE — 93005 ELECTROCARDIOGRAM TRACING: CPT

## 2021-05-17 PROCEDURE — 99285 EMERGENCY DEPT VISIT HI MDM: CPT

## 2021-05-17 PROCEDURE — 83605 ASSAY OF LACTIC ACID: CPT

## 2021-05-17 PROCEDURE — 74011250636 HC RX REV CODE- 250/636: Performed by: STUDENT IN AN ORGANIZED HEALTH CARE EDUCATION/TRAINING PROGRAM

## 2021-05-17 PROCEDURE — 81001 URINALYSIS AUTO W/SCOPE: CPT

## 2021-05-17 PROCEDURE — 93306 TTE W/DOPPLER COMPLETE: CPT

## 2021-05-17 PROCEDURE — 94640 AIRWAY INHALATION TREATMENT: CPT

## 2021-05-17 PROCEDURE — 82553 CREATINE MB FRACTION: CPT

## 2021-05-17 PROCEDURE — 97116 GAIT TRAINING THERAPY: CPT

## 2021-05-17 PROCEDURE — 74011000636 HC RX REV CODE- 636: Performed by: STUDENT IN AN ORGANIZED HEALTH CARE EDUCATION/TRAINING PROGRAM

## 2021-05-17 PROCEDURE — 85025 COMPLETE CBC W/AUTO DIFF WBC: CPT

## 2021-05-17 PROCEDURE — 71045 X-RAY EXAM CHEST 1 VIEW: CPT

## 2021-05-17 PROCEDURE — 74176 CT ABD & PELVIS W/O CONTRAST: CPT

## 2021-05-17 PROCEDURE — 94761 N-INVAS EAR/PLS OXIMETRY MLT: CPT

## 2021-05-17 PROCEDURE — 74011250636 HC RX REV CODE- 250/636: Performed by: EMERGENCY MEDICINE

## 2021-05-17 PROCEDURE — 74011000258 HC RX REV CODE- 258: Performed by: STUDENT IN AN ORGANIZED HEALTH CARE EDUCATION/TRAINING PROGRAM

## 2021-05-17 PROCEDURE — 83036 HEMOGLOBIN GLYCOSYLATED A1C: CPT

## 2021-05-17 PROCEDURE — 97162 PT EVAL MOD COMPLEX 30 MIN: CPT

## 2021-05-17 PROCEDURE — 70470 CT HEAD/BRAIN W/O & W/DYE: CPT

## 2021-05-17 PROCEDURE — 74011250636 HC RX REV CODE- 250/636: Performed by: PHYSICIAN ASSISTANT

## 2021-05-17 PROCEDURE — 80053 COMPREHEN METABOLIC PANEL: CPT

## 2021-05-17 RX ORDER — PANTOPRAZOLE SODIUM 40 MG/1
40 TABLET, DELAYED RELEASE ORAL
Status: DISCONTINUED | OUTPATIENT
Start: 2021-05-17 | End: 2021-05-19

## 2021-05-17 RX ORDER — ATORVASTATIN CALCIUM 40 MG/1
40 TABLET, FILM COATED ORAL
Status: DISCONTINUED | OUTPATIENT
Start: 2021-05-17 | End: 2021-05-19

## 2021-05-17 RX ORDER — GUAIFENESIN 100 MG/5ML
81 LIQUID (ML) ORAL DAILY
Status: DISCONTINUED | OUTPATIENT
Start: 2021-05-17 | End: 2021-05-19

## 2021-05-17 RX ORDER — ACETAMINOPHEN 650 MG/1
650 SUPPOSITORY RECTAL
Status: DISCONTINUED | OUTPATIENT
Start: 2021-05-17 | End: 2021-05-22 | Stop reason: HOSPADM

## 2021-05-17 RX ORDER — SODIUM CHLORIDE 9 MG/ML
100 INJECTION, SOLUTION INTRAVENOUS CONTINUOUS
Status: DISCONTINUED | OUTPATIENT
Start: 2021-05-17 | End: 2021-05-17

## 2021-05-17 RX ORDER — PREDNISONE 10 MG/1
TABLET ORAL
COMMUNITY
Start: 2021-05-14

## 2021-05-17 RX ORDER — LABETALOL HYDROCHLORIDE 5 MG/ML
5 INJECTION, SOLUTION INTRAVENOUS
Status: DISCONTINUED | OUTPATIENT
Start: 2021-05-17 | End: 2021-05-22 | Stop reason: HOSPADM

## 2021-05-17 RX ORDER — COLCHICINE 0.6 MG/1
0.6 TABLET ORAL DAILY
Status: DISCONTINUED | OUTPATIENT
Start: 2021-05-17 | End: 2021-05-22 | Stop reason: HOSPADM

## 2021-05-17 RX ORDER — ENOXAPARIN SODIUM 100 MG/ML
1 INJECTION SUBCUTANEOUS EVERY 12 HOURS
Status: DISCONTINUED | OUTPATIENT
Start: 2021-05-17 | End: 2021-05-22 | Stop reason: HOSPADM

## 2021-05-17 RX ORDER — MORPHINE SULFATE 2 MG/ML
2 INJECTION, SOLUTION INTRAMUSCULAR; INTRAVENOUS
Status: DISCONTINUED | OUTPATIENT
Start: 2021-05-17 | End: 2021-05-18

## 2021-05-17 RX ORDER — SODIUM CHLORIDE, SODIUM LACTATE, POTASSIUM CHLORIDE, CALCIUM CHLORIDE 600; 310; 30; 20 MG/100ML; MG/100ML; MG/100ML; MG/100ML
250 INJECTION, SOLUTION INTRAVENOUS CONTINUOUS
Status: DISPENSED | OUTPATIENT
Start: 2021-05-17 | End: 2021-05-18

## 2021-05-17 RX ORDER — PROPRANOLOL HYDROCHLORIDE 10 MG/1
10 TABLET ORAL 2 TIMES DAILY
Status: DISCONTINUED | OUTPATIENT
Start: 2021-05-17 | End: 2021-05-22 | Stop reason: HOSPADM

## 2021-05-17 RX ORDER — FENTANYL CITRATE 50 UG/ML
50 INJECTION, SOLUTION INTRAMUSCULAR; INTRAVENOUS
Status: COMPLETED | OUTPATIENT
Start: 2021-05-17 | End: 2021-05-17

## 2021-05-17 RX ORDER — ACETAMINOPHEN 325 MG/1
650 TABLET ORAL
Status: DISCONTINUED | OUTPATIENT
Start: 2021-05-17 | End: 2021-05-22 | Stop reason: HOSPADM

## 2021-05-17 RX ORDER — METRONIDAZOLE 500 MG/100ML
500 INJECTION, SOLUTION INTRAVENOUS EVERY 12 HOURS
Status: DISCONTINUED | OUTPATIENT
Start: 2021-05-17 | End: 2021-05-19

## 2021-05-17 RX ADMIN — ATORVASTATIN CALCIUM 40 MG: 40 TABLET, FILM COATED ORAL at 04:50

## 2021-05-17 RX ADMIN — CEFTRIAXONE 1 G: 1 INJECTION, POWDER, FOR SOLUTION INTRAMUSCULAR; INTRAVENOUS at 08:44

## 2021-05-17 RX ADMIN — ENOXAPARIN SODIUM 80 MG: 80 INJECTION SUBCUTANEOUS at 04:50

## 2021-05-17 RX ADMIN — MORPHINE SULFATE 2 MG: 2 INJECTION, SOLUTION INTRAMUSCULAR; INTRAVENOUS at 18:06

## 2021-05-17 RX ADMIN — FENTANYL CITRATE 50 MCG: 50 INJECTION, SOLUTION INTRAMUSCULAR; INTRAVENOUS at 03:29

## 2021-05-17 RX ADMIN — SODIUM CHLORIDE 1000 ML: 9 INJECTION, SOLUTION INTRAVENOUS at 15:09

## 2021-05-17 RX ADMIN — ARFORMOTEROL TARTRATE: 15 SOLUTION RESPIRATORY (INHALATION) at 21:34

## 2021-05-17 RX ADMIN — PANTOPRAZOLE SODIUM 40 MG: 40 TABLET, DELAYED RELEASE ORAL at 08:31

## 2021-05-17 RX ADMIN — PROPRANOLOL HYDROCHLORIDE 10 MG: 10 TABLET ORAL at 18:32

## 2021-05-17 RX ADMIN — MORPHINE SULFATE 2 MG: 2 INJECTION, SOLUTION INTRAMUSCULAR; INTRAVENOUS at 21:49

## 2021-05-17 RX ADMIN — METRONIDAZOLE 500 MG: 500 INJECTION, SOLUTION INTRAVENOUS at 20:22

## 2021-05-17 RX ADMIN — IOPAMIDOL 100 ML: 755 INJECTION, SOLUTION INTRAVENOUS at 08:00

## 2021-05-17 RX ADMIN — ENOXAPARIN SODIUM 80 MG: 80 INJECTION SUBCUTANEOUS at 18:05

## 2021-05-17 RX ADMIN — METRONIDAZOLE 500 MG: 500 INJECTION, SOLUTION INTRAVENOUS at 09:56

## 2021-05-17 RX ADMIN — ATORVASTATIN CALCIUM 40 MG: 40 TABLET, FILM COATED ORAL at 21:45

## 2021-05-17 RX ADMIN — ASPIRIN 81 MG: 81 TABLET, CHEWABLE ORAL at 08:31

## 2021-05-17 RX ADMIN — SODIUM CHLORIDE, POTASSIUM CHLORIDE, SODIUM LACTATE AND CALCIUM CHLORIDE 250 ML/HR: 600; 310; 30; 20 INJECTION, SOLUTION INTRAVENOUS at 15:13

## 2021-05-17 RX ADMIN — PROPRANOLOL HYDROCHLORIDE 10 MG: 10 TABLET ORAL at 09:56

## 2021-05-17 RX ADMIN — SODIUM CHLORIDE 100 ML/HR: 9 INJECTION, SOLUTION INTRAVENOUS at 04:50

## 2021-05-17 RX ADMIN — ARFORMOTEROL TARTRATE: 15 SOLUTION RESPIRATORY (INHALATION) at 08:33

## 2021-05-17 NOTE — ED NOTES
Report given to [facility], RN. They were informed of patient chief complaint, current status, orders completed (to include IV access/medications/radiology testing), outstanding orders that still need to be completed, and the treatment plan. Ensured no questions or concerns regarding the patient prior to departure.

## 2021-05-17 NOTE — CONSULTS
Neurology Note    Patient ID:  Flor Ramires  [id number]  70 y.o.  1950      Date of Consultation:  May 17, 2021    Referring Physician: Dr. Bao Car    Reason for Consultation:  Visual changes    Subjective: my vision       History of Present Illness:   Lynda Keane Sr. is a 70 y.o. male who presented to an outside emergency room with symptoms of abdominal discomfort, dizziness, and lightheadedness. There is also been blurry vision. The patient reported that the symptoms had been going on for approximately 2+ days prior to presenting to outside emergency room. At the outside emergency room, he was noted to have a possible occipital stroke in evolution on head CT. He was found to have  atrial fibrillation seen on EKG. He was transferred to [facility].  Since arrival, he does feel that his vision has not changed. He also did still feel slightly unsteady. He denied any numbness or tingling in his arms or legs. He does have abdominal pain which is diffuse in nature. In the emergency department, he was found to have an elevated white blood cell count, elevated lipase, and a head CT which does reveal a stroke in evolution in the right posterior parietal lobe. Past Medical History:   Diagnosis Date    Atrial flutter ([address]     Hypertension     Ill-defined condition     gout      Melanoma. Past Surgical History:   Procedure Laterality Date    HX APPENDECTOMY      HX OTHER SURGICAL      Cardiac Ablation for A. Flutter 07/2018        Family History   Problem Relation Age of Onset    Diabetes Mother     Heart Attack Father         Social History     Tobacco Use    Smoking status: Current Every Day Smoker     Packs/day: 1.50    Smokeless tobacco: Former User   Substance Use Topics    Alcohol use:  Yes     Alcohol/week: 10.0 standard drinks     Types: 10 Shots of liquor per week        No Known Allergies       Current Facility-Administered Medications   Medication Dose Route Frequency    colchicine tablet 0.6 mg  0.6 mg Oral DAILY    arformoterol 15 mcg/budesonide 0.25 mg neb solution   Nebulization BID    propranoloL (INDERAL) tablet 10 mg  10 mg Oral BID    acetaminophen (TYLENOL) tablet 650 mg  650 mg Oral Q4H PRN    Or    acetaminophen (TYLENOL) solution 650 mg  650 mg Per NG tube Q4H PRN    Or    acetaminophen (TYLENOL) suppository 650 mg  650 mg Rectal Q4H PRN    aspirin chewable tablet 81 mg  81 mg Oral DAILY    atorvastatin (LIPITOR) tablet 40 mg  40 mg Oral QHS    labetaloL (NORMODYNE;TRANDATE) injection 5 mg  5 mg IntraVENous Q10MIN PRN    enoxaparin (LOVENOX) injection 80 mg  1 mg/kg SubCUTAneous Q12H    0.9% sodium chloride infusion  100 mL/hr IntraVENous CONTINUOUS    pantoprazole (PROTONIX) tablet 40 mg  40 mg Oral ACB    cefTRIAXone (ROCEPHIN) 1 g in 0.9% sodium chloride (MBP/ADV) 50 mL MBP  1 g IntraVENous Q24H    metroNIDAZOLE (FLAGYL) IVPB premix 500 mg  500 mg IntraVENous Q12H     Current Outpatient Medications   Medication Sig    predniSONE (DELTASONE) 10 mg tablet     colchicine 0.6 mg tablet Take 0.6 mg by mouth daily.  allopurinol (ZYLOPRIM) 100 mg tablet Take 1 Tab by mouth daily.  fluticasone-vilanterol (BREO ELLIPTA) 100-25 mcg/dose inhaler Take 1 Puff by inhalation daily.  folic acid (FOLVITE) 1 mg tablet Take 1 Tab by mouth daily.  propranolol (INDERAL) 10 mg tablet Take 1 Tab by mouth two (2) times a day.  Thiamine Mononitrate (B-1) 100 mg tablet Take 1 Tab by mouth daily.        Review of Systems:    General, constitutional: negative  Eyes, vision: negative  Ears, nose, throat: negative  Cardiovascular, heart: negative  Respiratory: negative  Gastrointestinal: abd. pain  Genitourinary: negative  Musculoskeletal: negative  Skin and integumentary: negative  Psychiatric: negative  Endocrine: negative  Neurological: negative, except for HPI  Hematologic/lymphatic: negative  Allergy/immunology: negative    Objective:     Visit Vitals  BP (!) 152/96   Pulse 93   Temp 98 °F (36.7 °C)   Resp 18   Ht 5' 9\" (1.753 m)   Wt 175 lb (79.4 kg)   SpO2 94%   BMI 25.84 kg/m²       Physical Exam:    General:  appears well nourished in no acute distress  Neck: no carotid bruits  Lungs: clear to auscultation  Heart:  no murmurs, regular rate  Lower extremity: peripheral pulses palpable and no edema  Skin: intact    Neurological exam:    Awake, alert, oriented to person, place and time  Recent and remote memory were normal  Attention and concentration were intact  Language was intact. There was no aphasia  Speech: no dysarthria  Fund of knowledge was preserved    Cranial nerves:   II-XII were tested    Perrrla  Fundoscopic examination revealed venous pulsations and no clear abnormalities  Incomplete homonymous field cut  Eomi, no evidence of nystagmus  Facial sensation:  normal and symmetric  Facial motor: normal and symmetric  Hearing intact  SCM strength intact  Tongue: midline without fasciculations    Motor: Tone normal  Mild left pronator drift    No evidence of fasciculations    Strength testing:   deltoid triceps biceps Wrist ext. Wrist flex. intrinsics Hip flex. Hip ext. Knee ext. Knee flex Dorsi flex Plantar flex   Right 5 5 5 5 5 5 5 5 5 5 5 5   Left 4 5 5 5 5 5 5 5 5 5 5 5         Sensory:  Upper extremity: intact to pp  Lower extremity: intact to pp    Reflexes:    Right Left  Biceps  2 2  Triceps 2 2  Brachiorad. 2 2  Patella  2 2  Achilles 2 2    Plantar response:  Extensor on the left      Cerebellar testing:  no tremor apparent, finger/nose and jacquie were dysmetric on the left. Gait: not assessed in the Er.     Labs:     Lab Results   Component Value Date/Time    Hemoglobin A1c 4.6 08/23/2018 07:51 AM    Sodium 136 05/17/2021 03:15 AM    Potassium 4.8 05/17/2021 03:15 AM    Chloride 103 05/17/2021 03:15 AM    Glucose 90 05/17/2021 03:15 AM    BUN 25 (H) 05/17/2021 03:15 AM    Creatinine 1.20 05/17/2021 03:15 AM    Calcium 8.8 05/17/2021 03:15 AM    WBC 18.6 (H) 05/17/2021 03:15 AM    HCT 52.5 (H) 05/17/2021 03:15 AM    HGB 17.6 (H) 05/17/2021 03:15 AM    PLATELET 245 95/10/5313 03:15 AM       Imaging:    No results found for this or any previous visit. Results from [place] encounter on 05/17/21   CT ABD PELV WO CONT    Narrative EXAM: CT ABD PELV WO CONT    INDICATION: r/o recurrent pancreatitis , complications    COMPARISON: 3/17/2021    CONTRAST:  None. TECHNIQUE:   Thin axial images were obtained through the abdomen and pelvis. Coronal and  sagittal reformats were generated. Oral contrast was not administered. CT dose  reduction was achieved through use of a standardized protocol tailored for this  examination and automatic exposure control for dose modulation. The absence of intravenous contrast material reduces the sensitivity for  evaluation of the vasculature and solid organs. FINDINGS:   LOWER THORAX: Dependent atelectasis. No significant abnormality  LIVER: No mass. BILIARY TREE: Gallbladder is within normal limits. CBD is not dilated. SPLEEN: Lobulated. Irregular wedge-shaped hypodensities  PANCREAS: Decreased size of previously noted hypodense cysts in the pancreas. ADRENALS: Unremarkable. KIDNEYS/URETERS: No calculus or hydronephrosis. Simple cyst in left kidney  require no further follow-up. Residual contrast excretion from the bilateral  kidneys  STOMACH: Unremarkable. SMALL BOWEL: Inflammatory changes with mild wall thickening of the proximal  duodenum as well as moderate wall thickening in the third portion the duodenum  as well as the proximal jejunum which is mildly dilated. COLON: Unremarkable  APPENDIX: Not visualized  PERITONEUM: Extensive mesenteric edema. Possible soft tissue mass versus focal  inflammatory changes adjacent to the third portion the duodenum best seen on the  coronal views. This is adjacent to the uncinate process of the pancreas.   RETROPERITONEUM: No lymphadenopathy or aortic aneurysm. REPRODUCTIVE ORGANS: Unremarkable  URINARY BLADDER: No mass or calculus. BONES: No destructive bone lesion. ABDOMINAL WALL: No mass or hernia. ADDITIONAL COMMENTS: N/A      Impression 1. Extensive mesenteric edema in the central mesentery with wall thickening of  the duodenum clearly in the proximal duodenum, third portion the duodenum. While  this may represent acute pancreatitis, less inflammation is centered on the  duodenum and mesentery. Possibilities include an infectious or inflammatory  duodenitis/enteritis. 2.  Previously noted cystic lesions in the pancreas and near completely  resolved. 3.  Findings were discussed with Dr. Carline Arboleda at the time of dictation       I did independently review the head CT from 5/17/2021. There was evidence of a right posterior parietal lobe stroke. There was no hemorrhagic component. Assessment and Plan:    The patient is a pleasant 66-year-old gentleman with acute onset of dizziness and visual difficulties. His CAT scan does show an acute to subacute stroke in the right posterior parietal lobe. He was found to have atrial fibrillation on telemetry. Acute stroke:  His risk factors for stroke include atrial fibrillation, smoking, htn    The patient cannot receive an MRI due to prior metal shrapnel. Hemoglobin A1c at goal.    He has been started on Lipitor. Lipid panel is pending. Echocardiogram pending. I would recommend anticoagulation given the embolic nature to the stroke given his history of atrial fibrillation. He has been started on Lovenox with plan to switch to Eliquis. Htn: aggressive control with goal sbp < 140    I did discuss the importance of smoking cessation. The patient would benefit from physical therapy, Occupational Therapy, and speech therapy. I provided stroke education today in regards to risk factors for stroke and lifestyle modifications to help minimize the risk of future stroke.   This included medication compliance, regular follow up with primary care physician,  and healthy lifestyle habits (nutrition/exercise)    Concern for abdominal inflammation/infection:  Pancreatitis versus enteritis  This is being followed by the primary team.      Neurology will continue to follow along closely     Active Problems:    H/O atrial flutter (8/23/2018)      A-fib ([facility] [address] (5/17/2021)      Acute CVA (cerebrovascular accident) ([facility] [address] [facility] (5/17/2021)      Abdominal pain (5/17/2021)      Acute pancreatitis (5/17/2021)                   Signed By:  Cynthia Parkinson DO FAAN    May 17, 2021

## 2021-05-17 NOTE — PROGRESS NOTES
Physical Therapy Note:  Chart reviewed in preparation for evaluation. Noted pending patient transfer to [facility] but is [facility] at this time. Will defer and follow up as able later today vs tomorrow.   Yuriy Aaron, PT, DPT

## 2021-05-17 NOTE — PROGRESS NOTES
Seen and evaluated in ED today. For details see H and P done by my colleague earlier today. Evaluated by GI, c/w iv fluids, pain control. Repeat labs in AM.  Added ceftriaxone and flagyl for possible duodenitis/enteritis. CT head showing Right postr parietal infarct. C/w therapeutic lovenox for now, will likely be switched to eliquis prior to discharge    ADDENDUM    Talked to wife Hermelinda over the phone with updates. She is angry that no one has called her with updates in last 12 hours. I updated her on the diagnosis and treatment going on. He is currently enrolled in a immunotherapy drug trial at [facility] for Melanoma with mets to lung. He gets it every three weeks and his last dose was 5 days back. She is concerned that this immunotherapy is causing his stroke and pancreatitis. He drinks alcohol ocassionally as per the wife. He recently had a gout flare and was given a steroid shot on the foot.       Will attempt to get records from [facility] in AM.

## 2021-05-17 NOTE — H&P
Hospitalist Admission Note    NAME: Dennis Ji .   :  1950   MRN:  [id number]     Date/Time:  2021 3:23 AM    Patient PCP: Megan Bergman MD   ______________________________________________________________________  Given the patient's current clinical presentation, I have a high level of concern for decompensation if discharged from the emergency department. Complex decision making was performed, which includes reviewing the patient's available past medical records, laboratory results, and x-ray films. My assessment of this patient's clinical condition and my plan of care is as follows. Assessment / Plan:  Acute CVA POA -ataxia with dizziness and blurry vision since Friday  Recurrent Afib with Mild RVR POA  History of A. fib/flutter status post ablation in 2018-has not been on any anticoagulation since as per patient  Outside ER CT head = right occipital CVA in evaluation suspected    Admit to neuro telemetry bed  Start aspirin 81 mg/day  Cannot get MRI as patient has some metal shrapnel in his eye and was told that he cannot get MRI at [address] [place] [address]. Repeat CT head with contrast at noon today  Start Lipitor, check lipid panel  Check hemoglobin A1c postoperative wound  Inpatient neurology, PT, OT, SLP eval  Check 2D echo  Start Lovenox 1 mg/kg subcu q. 12 for now, will likely need to be on Eliquis once repeat CT confirms acute/subacute CVA and no brain metastases with history of malignant melanoma-plan to restart NOAC like in the past for A. fib if patient did have acute CVA on imaging. Suspected recurrent pancreatitis POA  History of recent acute pancreatitis POA-in 2021  CXR= negative acute  Lactate 0.92  WBC 18.6  Lipase 485    Check CT abdomen pelvis  Follow lipase  N.p.o. for now  IV fluids  P.o.  PPI    Gout  Continue colchicine  Hold recently prescribed prednisone    H/o Malignant melanoma in Lung-status post treatment at [address] [place] [address] as per patient, now cured    Code Status: Full code as per patient wishes in ED  Surrogate Decision Maker: Wife    DVT Prophylaxis: Alcohol  GI Prophylaxis: not indicated    Baseline: Patient is ambulatory at baseline      Subjective:   CHIEF COMPLAINT: Abdominal pain with dizziness and blurry vision since Friday    HISTORY OF PRESENT ILLNESS:     Pascual Puentes is a 70 y.o.  male who presents with above complaints from home initially presented to outside ER ([facility] ED)-was found to have right occipital CVA in evolution with evidence of A. fib with mild RVR on EKG. patient was then transferred to [facility] [address] for further neuro work-up. Patient's denies any new complaints except persistence of mild abdominal discomfort. Dizziness/lightheadedness while walking and some blurry vision that has been there since Friday. Denies any chest pain, shortness of breath, palpitation, or syncope  Patient was found to have elevated WBC count, but was recently prescribed prednisone by PCP for gout flare  Patient had recently been diagnosed with pancreatitis on CT abdomen imaging in March-claims that he has stopped drinking alcohol since past 2 weeks. We were asked to admit for work up and evaluation of the above problems. Past Medical History:   Diagnosis Date    Atrial flutter ([address]     Hypertension     Ill-defined condition     gout        Past Surgical History:   Procedure Laterality Date    HX APPENDECTOMY      HX OTHER SURGICAL      Cardiac Ablation for A. Flutter 07/2018       Social History     Tobacco Use    Smoking status: Current Every Day Smoker     Packs/day: 1.50    Smokeless tobacco: Former User   Substance Use Topics    Alcohol use: Yes     Alcohol/week: 10.0 standard drinks     Types: 10 Shots of liquor per week        Family History   Problem Relation Age of Onset    Diabetes Mother     Heart Attack Father      No Known Allergies     Prior to Admission medications    Medication Sig Start Date End Date Taking? Authorizing Provider   colchicine 0.6 mg tablet Take 0.6 mg by mouth daily. Javy Dove MD   allopurinol (ZYLOPRIM) 100 mg tablet Take 1 Tab by mouth daily. 8/24/18   Beth Johansen NP   fluticasone-vilanterol (BREO ELLIPTA) 100-25 mcg/dose inhaler Take 1 Puff by inhalation daily. Javy Dove MD   folic acid (FOLVITE) 1 mg tablet Take 1 Tab by mouth daily. 7/8/18   Tico Alexander MD   propranolol (INDERAL) 10 mg tablet Take 1 Tab by mouth two (2) times a day. 7/7/18   Tico Alexander MD   Thiamine Mononitrate (B-1) 100 mg tablet Take 1 Tab by mouth daily.  7/8/18   Tico Alexander MD       REVIEW OF SYSTEMS:         Total of 12 systems reviewed as follows:       POSITIVE= underlined text  Negative = text not underlined  General:  fever, chills, sweats, generalized weakness, weight loss/gain,      loss of appetite   Eyes:    blurred vision, eye pain, loss of vision, double vision  ENT:    rhinorrhea, pharyngitis   Respiratory:   cough, sputum production, SOB, DANIELS, wheezing, pleuritic pain   Cardiology:   chest pain, palpitations, orthopnea, PND, edema, syncope   Gastrointestinal:  abdominal pain , N/V, diarrhea, dysphagia, constipation, bleeding   Genitourinary:  frequency, urgency, dysuria, hematuria, incontinence   Muskuloskeletal :  arthralgia, myalgia, back pain  Hematology:  easy bruising, nose or gum bleeding, lymphadenopathy   Dermatological: rash, ulceration, pruritis, color change / jaundice  Endocrine:   hot flashes or polydipsia   Neurological:  headache, dizziness, confusion, focal weakness, paresthesia,     Speech difficulties, memory loss, gait difficulty  Psychological: Feelings of anxiety, depression, agitation    Objective:   VITALS:    Visit Vitals  BP (!) 130/95 (BP 1 Location: Left arm, BP Patient Position: At rest)   Pulse 92   Temp 98 °F (36.7 °C)   Resp 18   Ht 5' 9\" (1.753 m)   Wt 79.4 kg (175 lb)   SpO2 93%   BMI 25.84 kg/m²       PHYSICAL EXAM:    General: Alert, cooperative, no distress, appears stated age. HEENT: Atraumatic, anicteric sclerae, pink conjunctivae     No oral ulcers, mucosa moist, throat clear, dentition fair  Neck:  Supple, symmetrical,  thyroid: non tender  Lungs:   Clear to auscultation bilaterally. No Wheezing or Rhonchi. No rales. Chest wall:  No tenderness  No Accessory muscle use. Heart:   Irregular  Rhythm +,  No  murmur   No edema  Abdomen:   Soft, non-tender. Not distended. Bowel sounds normal  Extremities: No cyanosis. No clubbing,      Skin turgor normal, Capillary refill normal, Radial dial pulse 2+  Skin:     Not pale. Not Jaundiced  No rashes   Psych:  Good insight. Not depressed. Not anxious or agitated. Neurologic: EOMs intact. No facial asymmetry. No aphasia or slurred speech. Symmetrical strength, Sensation grossly intact. Alert and oriented X 4.     _______________________________________________________________________  Care Plan discussed with:    Comments   Patient x    Family      RN x    Care Manager                    Consultant:  ceci MORRISON MD at [facility] ED, Dr Leopoldo Arabia at [address] [facility]   _______________________________________________________________________  Expected  Disposition:   Home with Family x   HH/PT/OT/RN ?   SNF/LTC    SAVITA    ________________________________________________________________________  TOTAL TIME:  54 Minutes    Critical Care Provided     Minutes non procedure based      Comments    x Reviewed previous records   >50% of visit spent in counseling and coordination of care x Discussion with patient and questions answered       ________________________________________________________________________  Signed: Nhung Nazario MD    Procedures: see electronic medical records for all procedures/Xrays and details which were not copied into this note but were reviewed prior to creation of Plan.     LAB DATA REVIEWED:    Recent Results (from the past 24 hour(s))   EKG, 12 LEAD, INITIAL    Collection Time: 05/17/21  3:14 AM   Result Value Ref Range    Ventricular Rate 102 BPM    Atrial Rate 104 BPM    QRS Duration 82 ms    Q-T Interval 350 ms    QTC Calculation (Bezet) 456 ms    Calculated R Axis 47 degrees    Calculated T Axis 22 degrees    Diagnosis       Atrial fibrillation with rapid ventricular response  Nonspecific ST abnormality  When compared with ECG of 17-MAR-2021 14:51,  No significant change was found

## 2021-05-17 NOTE — PROGRESS NOTES
Problem: Communication Impaired (Adult)  Goal: *Acute Goals and Plan of Care (Insert Text)  Description: 1. Patient will participate with written expression eval.   2. Patient will complete reading comprehension eval.   Outcome: Not Met   SPEECH LANGUAGE PATHOLOGY EVALUATION  Patient: Ofe Miner Sr. (75 y.o. male)  Date: 5/17/2021  Primary Diagnosis: A-fib ([facility] [I48.91]  Acute CVA (cerebrovascular accident) ([facility] [I63.9]        Precautions:        ASSESSMENT :  Based on the objective data described below, the patient presents with R parietal CVA and pancreatitis. Basic language and motor speech were wnl. He had inattention/neglect (will follow up with OT as they determine what his visual deficits are. He ignored the L margin when reading over 4-5 word sentences. Compensated well when the L margin was pointed out to the patient. Did not assess reading comp or written expression due to the patient's request not to continue due to his pain related to his pancreatitis. He is NPO for testing. Patient will benefit from skilled intervention to address the above impairments  Patients rehabilitation potential is considered to be Good     PLAN :  Recommendations and Planned Interventions: Op speech   Frequency/Duration: Patient will be followed by speech-language pathology 3 times a week to address goals. Discharge Recommendations: Outpatient     SUBJECTIVE:   Patient stated he was in pain. OBJECTIVE:     Past Medical History:   Diagnosis Date    Atrial flutter ([facility]     Hypertension     Ill-defined condition     gout     Past Surgical History:   Procedure Laterality Date    HX APPENDECTOMY      HX OTHER SURGICAL      Cardiac Ablation for A.  Flutter 07/2018     Prior Level of Function/Home Situation:   Home Situation  Home Environment: Private residence  # Steps to Enter: 2  One/Two Story Residence: Two story  # of Interior Steps: 12  Living Alone: No  Support Systems: Child(june)  Tub or Shower Type: Tub/Shower combination  Mental Status:  Neurologic State: Alert, Appropriate for age  Orientation Level: Appropriate for age, Oriented X4  Cognition: Appropriate decision making, Appropriate safety awareness, Appropriate for age attention/concentration, Follows commands           Motor Speech:     Language Comprehension and Expression:   wnl  Basic language was wnl. Reading Comprehension  Pre-Morbid Reading Status: Literate  Sentence: Impaired  Cues : Minimal  Overall Impairment Severity: Mild-moderate            Pragmatics:  Pragmatics Impairment: No impairment  Pragmatics Impairment Severity: None             NOMS: aud comp 7               After treatment:   Patient left in no apparent distress in bed    COMMUNICATION/EDUCATION:   Patient was educated regarding his deficit(s) of L visual deficits. The patient's plan of care including recommendations, planned interventions, and recommended diet changes were discussed with: Occupational therapist.     Patient/family have participated as able in goal setting and plan of care.     Thank you for this referral.  Serene Stanley SLP  Time Calculation: 8 mins

## 2021-05-17 NOTE — ED NOTES
TRANSFER - OUT REPORT:    Verbal report given to Denisha Farias RN(name) on Jeff Dee .  being transferred to NSTU(unit) for routine progression of care       Report consisted of patients Situation, Background, Assessment and   Recommendations(SBAR). Information from the following report(s) SBAR, ED Summary, [facility] and Recent Results was reviewed with the receiving nurse. Lines:   Peripheral IV 05/17/21 Left Arm (Active)        Opportunity for questions and clarification was provided.       Patient transported with:   [employer]

## 2021-05-17 NOTE — PROGRESS NOTES
Transition of Care Plan:    RUR: 13 %  Disposition: TBD PT/OT consults pending disposition  Follow up appointments:PCP, Cardiology, GI, Neurology   DME needed: Patient has no DME at home  Transportation at Discharge: Patient wife to transport   [address] or means to access home:   Pt wife has access      IM Medicare letter: 2nd IM letter before discharge   Caregiver Contact: Wife Ryan Bansal [phone]  Discharge Caregiver contacted prior to discharge? Unit CM to follow up before discharge    Reason for Admission:  Acute CVA                     RUR Score:   13 %                  Plan for utilizing home health:   TBD PT/OT        PCP: First and Last name:  Danyell Potts MD     Name of Practice: [address] [place] [address]    Are you a current patient: Yes/No: Yes   Approximate date of last visit: 21   Can you participate in a virtual visit with your PCP:  Yes                    Current Advanced Directive/Advance Care Plan: Full Code  Chris 13 (ACP) Conversation    Date of Conversation: 2021  Conducted with: Patient with Decision Making Capacity      Content/Action Overview:   Has NO ACP documents/care preferences - information provided, considering goals and options  Reviewed DNR/DNI and patient elects Full Code (Attempt Resuscitation)    Length of Voluntary ACP Conversation in minutes:  <16 minutes (Non-Billable)    Sugar Pacheco RN              Transition of Care Plan:                    CM met with patient at the bedside to discuss discharge planning. Patient name, , and demographics all verified in chart. Patient lives in a two story home [address] with his wife. Patient reports that he is still employed, drives, and is able to complete all ADLs independently. Patient uses no DME at baseline has no SNF, IPR, or HH history. Patient being followed by [address] [place] [address].  Pt has a history of Myeloma and was recently receiving infusion treatments. Per patient he is cured and not requiring any more treatments. Patient's preferred pharmacy is [facility] [address]. Care Management Interventions  PCP Verified by CM: Yes  Mode of Transport at Discharge: Other (see comment)(Patient wife to transport)  Transition of Care Consult (CM Consult): Discharge Planning  Physical Therapy Consult: Yes  Occupational Therapy Consult: Yes  Speech Therapy Consult: Yes  Current Support Network: Lives with Spouse  Confirm Follow Up Transport: Self  Discharge Location  Discharge Placement: Other:(TBD pending PT/OT consults )    Unit CM to continue to follow for discharge needs and planning.     GAYATRI CardonaN, RN, BSW   RN Care Manager

## 2021-05-17 NOTE — CONSULTS
Gastroenterology Consultation Note  GARCÍA Valecnia   for Dr. Crystal Suárez    NAME: Evan Mckay Sr. : 1950 MRN: [id number]   ATTG: Dr. Cleve Zaragoza PCP: Jayashree Tavarez MD  Date/Time:  2021 2:12 PM  Subjective:   REASON FOR CONSULT:      Evan Mckay is a 70 y.o.  male who I was asked to see for pancreatitis vs duodenitis. He was seen 2 months ago in [address] [place] [address] ED for finger and abdominal pain. Lipase was 2131 at that time, CT was done showing acute pancreatitis with pancreatic cysts. Possible IPMN. Appears he was to be admitted however note was made that he had left prior to seeing hospitalist.    Pt also with hx of malignant melanoma to lung, receving chemo at [facility]. He reports he drinks alcohol once daily, one serving. He also takes nyquil nightly. Pain was improving after d/c until 3 days ago. He also reported blurry vision and lightheadedness. Went to [facility] ED, EKG done and found to be in A. Fib and had CT of head revealing possible infarct near right occipital lobe. CT of abdomen revealing inflammatory changes in mesentery due to pancreatitis, panniculitis, malignancy or other. Areas of low attenuation in the spleen, may represents infarcts, mets or other. Fatty liver, liver cyst. Lipase was 370, Alk Phos 140, AST, ALT and bili normal, Bun 24, Cr 1.34, TSH 3.842, WBC was elevate marialuisa 18. 1. He was transferred to [address] [place] [address] ED for further management and started on empiric ceftriaxone. He continues with epigastric abdominal pain. No N/V. Asking for food. Lipase increased slightly to 485, alk phos 126, Cr improved, albumin low at 2.7, WBC 18.6, plt stable as well as Ca. He was seen by our group for colonoscopy in the past.  Last seen for colonoscopy by Dr. Gentry Aaron, normal on 10/16/19 with 5 year recall    He is daily smoker.        Past Medical History:   Diagnosis Date    Atrial flutter ([facility] [address]     Hypertension     Ill-defined condition     gout      Past Surgical History: Procedure Laterality Date    HX APPENDECTOMY      HX OTHER SURGICAL      Cardiac Ablation for A. Flutter 07/2018     Social History     Tobacco Use    Smoking status: Current Every Day Smoker     Packs/day: 1.50    Smokeless tobacco: Former User   Substance Use Topics    Alcohol use: Yes     Alcohol/week: 10.0 standard drinks     Types: 10 Shots of liquor per week      Family History   Problem Relation Age of Onset    Diabetes Mother     Heart Attack Father       No Known Allergies   Home Medications:  Prior to Admission Medications   Prescriptions Last Dose Informant Patient Reported? Taking? Thiamine Mononitrate (B-1) 100 mg tablet Not Taking at Unknown time  No No   Sig: Take 1 Tab by mouth daily. allopurinol (ZYLOPRIM) 100 mg tablet   No No   Sig: Take 1 Tab by mouth daily. colchicine 0.6 mg tablet   Yes No   Sig: Take 0.6 mg by mouth daily. fluticasone-vilanterol (BREO ELLIPTA) 100-25 mcg/dose inhaler   Yes No   Sig: Take 1 Puff by inhalation daily. folic acid (FOLVITE) 1 mg tablet Not Taking at Unknown time  No No   Sig: Take 1 Tab by mouth daily. predniSONE (DELTASONE) 10 mg tablet   Yes No   propranolol (INDERAL) 10 mg tablet   No No   Sig: Take 1 Tab by mouth two (2) times a day.       Facility-Administered Medications: None     Hospital medications:  Current Facility-Administered Medications   Medication Dose Route Frequency    colchicine tablet 0.6 mg  0.6 mg Oral DAILY    arformoterol 15 mcg/budesonide 0.25 mg neb solution   Nebulization BID    propranoloL (INDERAL) tablet 10 mg  10 mg Oral BID    acetaminophen (TYLENOL) tablet 650 mg  650 mg Oral Q4H PRN    Or    acetaminophen (TYLENOL) solution 650 mg  650 mg Per NG tube Q4H PRN    Or    acetaminophen (TYLENOL) suppository 650 mg  650 mg Rectal Q4H PRN    aspirin chewable tablet 81 mg  81 mg Oral DAILY    atorvastatin (LIPITOR) tablet 40 mg  40 mg Oral QHS    labetaloL (NORMODYNE;TRANDATE) injection 5 mg  5 mg IntraVENous Q10MIN PRN    enoxaparin (LOVENOX) injection 80 mg  1 mg/kg SubCUTAneous Q12H    0.9% sodium chloride infusion  100 mL/hr IntraVENous CONTINUOUS    pantoprazole (PROTONIX) tablet 40 mg  40 mg Oral ACB    cefTRIAXone (ROCEPHIN) 1 g in 0.9% sodium chloride (MBP/ADV) 50 mL MBP  1 g IntraVENous Q24H    metroNIDAZOLE (FLAGYL) IVPB premix 500 mg  500 mg IntraVENous Q12H     Current Outpatient Medications   Medication Sig    predniSONE (DELTASONE) 10 mg tablet     colchicine 0.6 mg tablet Take 0.6 mg by mouth daily.  allopurinol (ZYLOPRIM) 100 mg tablet Take 1 Tab by mouth daily.  fluticasone-vilanterol (BREO ELLIPTA) 100-25 mcg/dose inhaler Take 1 Puff by inhalation daily.  folic acid (FOLVITE) 1 mg tablet Take 1 Tab by mouth daily.  propranolol (INDERAL) 10 mg tablet Take 1 Tab by mouth two (2) times a day.  Thiamine Mononitrate (B-1) 100 mg tablet Take 1 Tab by mouth daily.      REVIEW OF SYSTEMS:     []     Unable to obtain  ROS due to  []    mental status change  []    sedated   []    intubated  Review of Systems -   History obtained from the patient  General ROS: negative for - chills or fever  Psychological ROS: negative for - anxiety or depression  Hematological and Lymphatic ROS: negative for - bleeding problems  Respiratory ROS: no cough, shortness of breath, or wheezing  Cardiovascular ROS: no chest pain or dyspnea on exertion  Gastrointestinal ROS: See HPI  Genito-Urinary ROS: no dysuria, trouble voiding, or hematuria  Musculoskeletal ROS: positive for - hx of Gout  Neurological ROS: positive for - dizziness and gait disturbance  Dermatological ROS: negative for - rash    Objective:   VITALS:    Visit Vitals  BP (!) 130/95   Pulse 79   Temp 98.8 °F (37.1 °C)   Resp 18   Ht 5' 9\" (1.753 m)   Wt 79.4 kg (175 lb 0.7 oz)   SpO2 93%   BMI 25.85 kg/m²     Temp (24hrs), Av.4 °F (36.9 °C), Min:98 °F (36.7 °C), Max:98.8 °F (37.1 °C)    PHYSICAL EXAM:   General:    Alert, somewhat cooperative, no distress, appears stated age. Head:   Normocephalic, without obvious abnormality, atraumatic. Eyes:   Conjunctivae clear, anicteric sclerae. Pupils are equal  Nose:  Nares normal. No drainage or sinus tenderness. Throat:    Lips, mucosa, and tongue normal.  No Thrush  Neck:  Supple, symmetrical,  no adenopathy, thyroid: non tender  Back:    Symmetric,  No CVA tenderness. Lungs:   CTA bilaterally. No wheezing/rhonchi/rales. Heart:   Regular rate and rhythm,  no murmur, rub or gallop. Abdomen:   Soft, significantly tender with guarding and rebound. Not distended. Bowel sounds normal. No masses. Rectal:  Deferred   Extremities: No cyanosis. No edema. No clubbing  Skin:     Texture, turgor normal. No rashes/lesions/jaundice  Lymph: Cervical, supraclavicular normal.  Psych:  Poor insight. Not depressed. Not anxious or agitated. Neurologic: EOMs intact. No facial asymmetry. No aphasia or slurred speech normal strength, A/O X 3. LAB DATA REVIEWED:    Lab Results   Component Value Date/Time    WBC 18.6 (H) 05/17/2021 03:15 AM    HGB 17.6 (H) 05/17/2021 03:15 AM    HCT 52.5 (H) 05/17/2021 03:15 AM    PLATELET 044 64/98/0930 03:15 AM    MCV 91.9 05/17/2021 03:15 AM     Lab Results   Component Value Date/Time    ALT (SGPT) 28 05/17/2021 03:15 AM    Alk.  phosphatase 126 (H) 05/17/2021 03:15 AM    Bilirubin, total 0.6 05/17/2021 03:15 AM     Lab Results   Component Value Date/Time    Sodium 136 05/17/2021 03:15 AM    Potassium 4.8 05/17/2021 03:15 AM    Chloride 103 05/17/2021 03:15 AM    CO2 27 05/17/2021 03:15 AM    Anion gap 6 05/17/2021 03:15 AM    Glucose 90 05/17/2021 03:15 AM    BUN 25 (H) 05/17/2021 03:15 AM    Creatinine 1.20 05/17/2021 03:15 AM    BUN/Creatinine ratio 21 (H) 05/17/2021 03:15 AM    GFR est AA >60 05/17/2021 03:15 AM    GFR est non-AA 60 (L) 05/17/2021 03:15 AM    Calcium 8.8 05/17/2021 03:15 AM     Lab Results   Component Value Date/Time    Lipase 485 (H) 05/17/2021 03:15 AM Lab Results   Component Value Date/Time    INR 1.1 07/01/2018 06:22 PM    INR 1.0 07/01/2018 11:23 AM    Prothrombin time 11.1 07/01/2018 06:22 PM    Prothrombin time 10.3 07/01/2018 11:23 AM       CT Results (most recent):  Results from Hospital Encounter encounter on 05/17/21   CT HEAD W WO CONT    Narrative EXAM: CT HEAD W WO CONT    INDICATION: R/o CVA , metastasis? COMPARISON: None. CONTRAST: 100 mL of Isovue-370. TECHNIQUE:CT of the head was performed prior to and following uneventful rapid  bolus intravenous administration of contrast.  Brain and bone windows were  generated. Coronal and sagittal reformats. CT dose reduction was achieved  through use of a standardized protocol tailored for this examination and  automatic exposure control for dose modulation. FINDINGS:  There is a acute to subacute right posterior parietal ischemic event. There is  some mild mass effect. No Abnormal enhancement, no discrete mass. No shift of the midline extra-axial fluid collection or hemorrhage. Mild atrophy and white matter changes. Impression Right posterior parietal infarct. EXAM: CT ABD PELV WO CONT 5/17/21     INDICATION: r/o recurrent pancreatitis , complications     COMPARISON: 3/17/2021     CONTRAST:  None.     TECHNIQUE:   Thin axial images were obtained through the abdomen and pelvis. Coronal and  sagittal reformats were generated. Oral contrast was not administered. CT dose  reduction was achieved through use of a standardized protocol tailored for this  examination and automatic exposure control for dose modulation.      The absence of intravenous contrast material reduces the sensitivity for  evaluation of the vasculature and solid organs.     FINDINGS:   LOWER THORAX: Dependent atelectasis. No significant abnormality  LIVER: No mass. BILIARY TREE: Gallbladder is within normal limits. CBD is not dilated. SPLEEN: Lobulated.  Irregular wedge-shaped hypodensities  PANCREAS: Decreased size of previously noted hypodense cysts in the pancreas. ADRENALS: Unremarkable. KIDNEYS/URETERS: No calculus or hydronephrosis. Simple cyst in left kidney  require no further follow-up. Residual contrast excretion from the bilateral  kidneys  STOMACH: Unremarkable. SMALL BOWEL: Inflammatory changes with mild wall thickening of the proximal  duodenum as well as moderate wall thickening in the third portion the duodenum  as well as the proximal jejunum which is mildly dilated. COLON: Unremarkable  APPENDIX: Not visualized  PERITONEUM: Extensive mesenteric edema. Possible soft tissue mass versus focal  inflammatory changes adjacent to the third portion the duodenum best seen on the  coronal views. This is adjacent to the uncinate process of the pancreas. RETROPERITONEUM: No lymphadenopathy or aortic aneurysm. REPRODUCTIVE ORGANS: Unremarkable  URINARY BLADDER: No mass or calculus. BONES: No destructive bone lesion. ABDOMINAL WALL: No mass or hernia. ADDITIONAL COMMENTS: N/A     IMPRESSION  1. Extensive mesenteric edema in the central mesentery with wall thickening of  the duodenum clearly in the proximal duodenum, third portion the duodenum. While  this may represent acute pancreatitis, less inflammation is centered on the  duodenum and mesentery. Possibilities include an infectious or inflammatory  duodenitis/enteritis.     2. Previously noted cystic lesions in the pancreas and near completely  resolved.     3. Findings were discussed with Dr. Margot Pritchard at the time of dictation    Impression:  Active Problems:    H/O atrial flutter (8/23/2018)      A-fib ([address] (5/17/2021)      Acute CVA (cerebrovascular accident) ([address] (5/17/2021)      Abdominal pain (5/17/2021)      Acute pancreatitis (5/17/2021)         Plan:  Patient with recurrent pancreatitis possible due to EtOH. Unsure if initial episode in March ever fully resolved however pain improved.   He continues to be slightly altered, will check EtOH level and place on CIWA protocol. Will give a liter bolus and then proceed with 200 ml of LR for 24 hours. Continue to monitor lipase and electrolytes as well as CBC. WBC elevations are likely reactive secondary to inflammation. Keep NPO, hold on diet advancement until pain resolves. Needs to quit EtOH 100%, will review once he is more oriented.        ___________________________________________________  Care Plan discussed with:    [x]    Patient   []    Family   [x]    Nursing   []    Attending  Total Time :  30   minutes   ___________________________________________________  GI: GARCÍA Tilley       The patient was seen and examined  by me. I have discussed the case with the mid-level provider in detail. I have reviewed the patient's chart and the note. I personally performed all components of the history, physical, and medical decision making and agree with the assessment and plan, with minor modifications as noted. Please see APPs note for full details. Physical exam:  General:AAO x 3, RN is at bedside. c/o pain in abdomen  HEENT:  EOMI,   GI: Soft, epigastric pain with mild rebound, ND   Extremities: No edema    Data Review:  reviewed     Impression:   Pain in abdomen  Abnormal CT abdomen  Hx of ETOH use  Previous pancreatitis    Plan and discussion:  · We suggest treating this as possible recurrent acute pancreatitis 2/2 possible alcohol use. Would get ETOH level and start CIWA detox,MVI/folate/thiamine/mag  · IVF (see orders),  · IV analgesia, IV gastritis and DVT prophylaxis  · NPO for now,  · Serial exams,  · Follow up labs. · We will follow with you. Get outside/[facility] records please. Pt reports a hx of malignant melanoma to lung and is receving chemotherapy at [address] [place] [address]. · No indication to endoscopically evaluate his duodenum yet. Signed By: Malu Ko.  Avi Aguilera MD    5/17/2021  3:38 PM

## 2021-05-17 NOTE — PROGRESS NOTES
Problem: Mobility Impaired (Adult and Pediatric)  Goal: *Acute Goals and Plan of Care (Insert Text)  Description:   FUNCTIONAL STATUS PRIOR TO ADMISSION: Patient was independent and active without use of DME. 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 HOME SUPPORT PRIOR TO ADMISSION: The patient lived with his son but did not require assist.    Physical Therapy Goals  Initiated 5/17/2021  1. Patient will move from supine to sit and sit to supine  in bed with independence within 7 day(s). 2.  Patient will transfer from bed to chair and chair to bed with independence using the least restrictive device within 7 day(s). 3.  Patient will perform sit to stand with modified independence within 7 day(s). 4.  Patient will ambulate with modified independence for 250 feet with the least restrictive device within 7 day(s). 5.  Patient will ascend/descend 5 stairs with 1 handrail(s) with modified independence within 7 day(s). Outcome: Progressing Towards Goal   PHYSICAL THERAPY EVALUATION- NEURO POPULATION  Patient: Carline Santiago  (75 y.o. male)  Date: 5/17/2021  Primary Diagnosis: A-fib (HCC) [I48.91]  Acute CVA (cerebrovascular accident) [facility]) [I63.9]        Precautions:   Fall      ASSESSMENT  Based on the objective data described below, the patient presents with impaired balance, a left visual field cut, mild coordination impairment, impulsivity, and decreased insight into need for safety following admission for a right posterior parietal CVA, a fib, and potential pancreatitis. Patient endorses vision and balance changes that started 3 days ago and persisted. Upon arrival, he expressed the need to transfer to the bathroom. He mobilized with little regard for his IV and required maximum cues to stop to allow this writer to prepare the room. Noted trunk sway and path deviations during ambulation into the bathroom. This continued with gait training over ~100' requiring CGA for safety. Noted path deviations, scissoring, and an accelerated ronaldo but without gross LOB. Returned to supine post session and discussed requesting nursing assistance for all out of bed activity. 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 He now presents with vision and balance deficits increasing his risk of falls. Discharge recommendations to evolve with his medical course. Would recommend IP rehab at present vs home with 24 hour supervision and HHPT services if he refuses. Current Level of Function Impacting Discharge (mobility/balance): CGA for mobility but with decreased regard for safety      Other factors to consider for discharge: typically working full time     Patient will benefit from skilled therapy intervention to address the above noted impairments. PLAN :  Recommendations and Planned Interventions: bed mobility training, transfer training, gait training, therapeutic exercises, and therapeutic activities      Frequency/Duration: Patient will be followed by physical therapy:  5 times a week to address goals. Recommendation for discharge: (in order for the patient to meet his/her long term goals)  Therapy 3 hours per day 5-7 days per week    This discharge recommendation:  Has not yet been discussed the attending provider and/or case management    IF patient discharges home will need the following DME: to be determined (TBD)         SUBJECTIVE:   Patient stated I work 6 to 7 days a week.     OBJECTIVE DATA SUMMARY:   HISTORY:    Past Medical History:   Diagnosis Date    Atrial flutter ([address]     Hypertension     Ill-defined condition     gout     Past Surgical History:   Procedure Laterality Date    HX APPENDECTOMY      HX OTHER SURGICAL      Cardiac Ablation for A.  Flutter 07/2018       Personal factors and/or comorbidities impacting plan of care:     Home Situation  Home Environment: Private residence  # Steps to Enter: 2  One/Two Story Residence: Two story  # of Interior Steps: 12  Living Alone: No  Support Systems: Child(june)  Tub or Shower Type: Tub/Shower combination    EXAMINATION/PRESENTATION/DECISION MAKING:   Critical Behavior:  Neurologic State: Alert, Appropriate for age  Orientation Level: Appropriate for age, Oriented X4  Cognition: Appropriate decision making, Appropriate safety awareness, Appropriate for age attention/concentration, Follows commands     Hearing: Auditory  Auditory Impairment: Hard of hearing, bilateral  Skin:    Edema:   Range Of Motion:  AROM: Within functional limits                       Strength:    Strength: Generally decreased, functional                    Tone & Sensation:   Tone: Normal                              Coordination:  Coordination: Generally decreased, functional  Vision:      Functional Mobility:  Bed Mobility:  Rolling: Supervision  Supine to Sit: Minimum assistance        Transfers:  Sit to Stand: Contact guard assistance  Stand to Sit: Contact guard assistance                       Balance:   Sitting: Intact  Standing: Impaired  Standing - Static: Fair  Standing - Dynamic : Fair  Ambulation/Gait Training:  Distance (ft): 100 Feet (ft)  Assistive Device: Gait belt  Ambulation - Level of Assistance: Contact guard assistance     Gait Description (WDL): Exceptions to WDL  Gait Abnormalities: Decreased step clearance;Shuffling gait; Path deviations(narrow base of support)              Speed/Saira: Slow          Physical Therapy Evaluation Charge Determination   History Examination Presentation Decision-Making   MEDIUM  Complexity : 1-2 comorbidities / personal factors will impact the outcome/ POC  MEDIUM Complexity : 3 Standardized tests and measures addressing body structure, function, activity limitation and / or participation in recreation  MEDIUM Complexity : Evolving with changing characteristics  MEDIUM Complexity : FOTO score of 26-74      Based on the above components, the patient evaluation is determined to be of the following complexity level: MEDIUM    Pain Rating:      Activity Tolerance:   Good      After treatment patient left in no apparent distress:   Supine in bed and Call bell within reach    COMMUNICATION/EDUCATION:   The patients plan of care was discussed with: Registered nurse. Patient was educated regarding his deficit(s) of left visual field cut and impaired balance as this relates to his diagnosis of right posterior parietal infarct. He demonstrated Fair understanding as evidenced by verbalized understanding. Fall prevention education was provided and the patient/caregiver indicated understanding., Patient/family have participated as able in goal setting and plan of care. , and Patient/family agree to work toward stated goals and plan of care.     Thank you for this referral.  Lupe Stahl, PT, DPT   Time Calculation: 19 mins

## 2021-05-17 NOTE — PROGRESS NOTES
Occupational Therapy Note:    Orders received and chart reviewed in preparation for evaluation. Noted Pt is pending patient transfer to [facility] yet is currently [facility]. Will follow back as able today.      Thank you,    Cash Hunt OTR/L

## 2021-05-17 NOTE — PROGRESS NOTES
End of Shift Note    Bedside shift change report given to Yasmine Whitman (oncoming nurse) by Yesy Curry RN (offgoing nurse). Report included the following information SBAR    Shift worked:  DAYS   Shift summary and any significant changes:     -NEW ADMIT   -ABDOMINAL PAIN -> PRN MORPHINE   -NPO PER GI   -WIFE WANTS CONSTANT UPDATES     Concerns for physician to address:  NONE   Zone phone for oncoming shift:   [phone]     Patient Information  Mireya Stack Sr.  70 y.o.  5/17/2021  2:31 AM by Dakotah Irvin MD. Mireya Stack Sr. was admitted from Home    Problem List  Patient Active Problem List    Diagnosis Date Noted    A-fib [facility]) 05/17/2021    Acute CVA (cerebrovascular accident) ([facility] [address] 05/17/2021    Abdominal pain 05/17/2021    Acute pancreatitis 05/17/2021    Dehydration 08/24/2018    ARF (acute renal failure) ([facility] [address] 08/23/2018    Gout 08/23/2018    Hypotension 08/23/2018    H/O atrial flutter 08/23/2018    Severe sepsis ([facility] [address] 07/01/2018     Past Medical History:   Diagnosis Date    Atrial flutter ([facility] [address]     Hypertension     Ill-defined condition     gout       Core Measures:  CVA: Yes Yes  CHF:No Not applicable    Activity:  Activity Level: Bath Room Privileges, Up with Assistance  Number times ambulated in hallways past shift: 1  Number of times OOB to chair past shift: 3    Cardiac:   Cardiac Monitoring: Yes      Cardiac Rhythm: Sinus Tachy    Access:   Current line(s): PIV     Genitourinary:   Urinary status: voiding  Urinary Catheter? No  Respiratory:   O2 Device: None (Room air)  Chronic home O2 use?: NO  Incentive spirometer at bedside: N/A       GI:     Current diet:  No diet orders on file  Passing flatus: YES  Tolerating current diet: YES       Pain Management:   Patient states pain is manageable on current regimen: YES    Skin:  Mike Score: 22  Interventions: increase time out of bed    Patient Safety:  Fall Score:  Total Score: 1  Interventions: bed/chair alarm, assistive device (walker, cane, etc), gripper socks and pt to call before getting OOB     @Rollbelt  @dexterity to release roll belt  Yes/No ( must document dexterity  here by stating Yes or No here, otherwise this is a restraint and must follow restraint documentation policy.)    DVT prophylaxis:  DVT prophylaxis Med- YES    Active Consults:  IP CONSULT TO NEUROLOGY  IP CONSULT TO GASTROENTEROLOGY    Length of Stay:  Expected LOS: - - -  Actual LOS: 0  Discharge Plan: Yes Suhail Navas, RN

## 2021-05-18 LAB
CHOLEST SERPL-MCNC: 87 MG/DL
ERYTHROCYTE [DISTWIDTH] IN BLOOD BY AUTOMATED COUNT: 13 % (ref 11.5–14.5)
ETHANOL SERPL-MCNC: <10 MG/DL
HCT VFR BLD AUTO: 48.8 % (ref 36.6–50.3)
HDLC SERPL-MCNC: 32 MG/DL
HDLC SERPL: 2.7 {RATIO} (ref 0–5)
HGB BLD-MCNC: 16 G/DL (ref 12.1–17)
LDLC SERPL CALC-MCNC: 37 MG/DL (ref 0–100)
LIPASE SERPL-CCNC: 593 U/L (ref 73–393)
MCH RBC QN AUTO: 30.6 PG (ref 26–34)
MCHC RBC AUTO-ENTMCNC: 32.8 G/DL (ref 30–36.5)
MCV RBC AUTO: 93.3 FL (ref 80–99)
NRBC # BLD: 0 K/UL (ref 0–0.01)
NRBC BLD-RTO: 0 PER 100 WBC
PLATELET # BLD AUTO: 180 K/UL (ref 150–400)
PMV BLD AUTO: 9.7 FL (ref 8.9–12.9)
RBC # BLD AUTO: 5.23 M/UL (ref 4.1–5.7)
TRIGL SERPL-MCNC: 90 MG/DL (ref ?–150)
VLDLC SERPL CALC-MCNC: 18 MG/DL
WBC # BLD AUTO: 17.8 K/UL (ref 4.1–11.1)

## 2021-05-18 PROCEDURE — 74011250636 HC RX REV CODE- 250/636: Performed by: STUDENT IN AN ORGANIZED HEALTH CARE EDUCATION/TRAINING PROGRAM

## 2021-05-18 PROCEDURE — 97530 THERAPEUTIC ACTIVITIES: CPT

## 2021-05-18 PROCEDURE — 99233 SBSQ HOSP IP/OBS HIGH 50: CPT | Performed by: PSYCHIATRY & NEUROLOGY

## 2021-05-18 PROCEDURE — 74011000250 HC RX REV CODE- 250: Performed by: INTERNAL MEDICINE

## 2021-05-18 PROCEDURE — 97116 GAIT TRAINING THERAPY: CPT

## 2021-05-18 PROCEDURE — 82077 ASSAY SPEC XCP UR&BREATH IA: CPT

## 2021-05-18 PROCEDURE — 74011250636 HC RX REV CODE- 250/636: Performed by: PHYSICIAN ASSISTANT

## 2021-05-18 PROCEDURE — 80061 LIPID PANEL: CPT

## 2021-05-18 PROCEDURE — 65660000000 HC RM CCU STEPDOWN

## 2021-05-18 PROCEDURE — 85027 COMPLETE CBC AUTOMATED: CPT

## 2021-05-18 PROCEDURE — 36415 COLL VENOUS BLD VENIPUNCTURE: CPT

## 2021-05-18 PROCEDURE — 74011250636 HC RX REV CODE- 250/636: Performed by: INTERNAL MEDICINE

## 2021-05-18 PROCEDURE — 97112 NEUROMUSCULAR REEDUCATION: CPT

## 2021-05-18 PROCEDURE — 74011000258 HC RX REV CODE- 258: Performed by: STUDENT IN AN ORGANIZED HEALTH CARE EDUCATION/TRAINING PROGRAM

## 2021-05-18 PROCEDURE — 83690 ASSAY OF LIPASE: CPT

## 2021-05-18 PROCEDURE — 74011250637 HC RX REV CODE- 250/637: Performed by: SPECIALIST

## 2021-05-18 PROCEDURE — 94640 AIRWAY INHALATION TREATMENT: CPT

## 2021-05-18 PROCEDURE — 74011250637 HC RX REV CODE- 250/637: Performed by: INTERNAL MEDICINE

## 2021-05-18 PROCEDURE — 97165 OT EVAL LOW COMPLEX 30 MIN: CPT

## 2021-05-18 RX ORDER — HYDROMORPHONE HYDROCHLORIDE 1 MG/ML
1 INJECTION, SOLUTION INTRAMUSCULAR; INTRAVENOUS; SUBCUTANEOUS
Status: DISCONTINUED | OUTPATIENT
Start: 2021-05-18 | End: 2021-05-22 | Stop reason: HOSPADM

## 2021-05-18 RX ORDER — SODIUM CHLORIDE, SODIUM LACTATE, POTASSIUM CHLORIDE, CALCIUM CHLORIDE 600; 310; 30; 20 MG/100ML; MG/100ML; MG/100ML; MG/100ML
200 INJECTION, SOLUTION INTRAVENOUS CONTINUOUS
Status: DISCONTINUED | OUTPATIENT
Start: 2021-05-18 | End: 2021-05-22 | Stop reason: HOSPADM

## 2021-05-18 RX ORDER — POLYETHYLENE GLYCOL 3350 17 G/17G
17 POWDER, FOR SOLUTION ORAL 2 TIMES DAILY
Status: DISCONTINUED | OUTPATIENT
Start: 2021-05-18 | End: 2021-05-22 | Stop reason: HOSPADM

## 2021-05-18 RX ADMIN — MORPHINE SULFATE 2 MG: 2 INJECTION, SOLUTION INTRAMUSCULAR; INTRAVENOUS at 01:11

## 2021-05-18 RX ADMIN — MORPHINE SULFATE 2 MG: 2 INJECTION, SOLUTION INTRAMUSCULAR; INTRAVENOUS at 13:37

## 2021-05-18 RX ADMIN — METRONIDAZOLE 500 MG: 500 INJECTION, SOLUTION INTRAVENOUS at 21:58

## 2021-05-18 RX ADMIN — PROPRANOLOL HYDROCHLORIDE 10 MG: 10 TABLET ORAL at 17:52

## 2021-05-18 RX ADMIN — ENOXAPARIN SODIUM 80 MG: 80 INJECTION SUBCUTANEOUS at 05:03

## 2021-05-18 RX ADMIN — ATORVASTATIN CALCIUM 40 MG: 40 TABLET, FILM COATED ORAL at 21:57

## 2021-05-18 RX ADMIN — SODIUM CHLORIDE, POTASSIUM CHLORIDE, SODIUM LACTATE AND CALCIUM CHLORIDE 250 ML/HR: 600; 310; 30; 20 INJECTION, SOLUTION INTRAVENOUS at 04:10

## 2021-05-18 RX ADMIN — ASPIRIN 81 MG: 81 TABLET, CHEWABLE ORAL at 08:47

## 2021-05-18 RX ADMIN — PANTOPRAZOLE SODIUM 40 MG: 40 TABLET, DELAYED RELEASE ORAL at 08:47

## 2021-05-18 RX ADMIN — METRONIDAZOLE 500 MG: 500 INJECTION, SOLUTION INTRAVENOUS at 08:56

## 2021-05-18 RX ADMIN — POLYETHYLENE GLYCOL 3350 17 G: 17 POWDER, FOR SOLUTION ORAL at 17:52

## 2021-05-18 RX ADMIN — HYDROMORPHONE HYDROCHLORIDE 1 MG: 1 INJECTION, SOLUTION INTRAMUSCULAR; INTRAVENOUS; SUBCUTANEOUS at 16:23

## 2021-05-18 RX ADMIN — PROPRANOLOL HYDROCHLORIDE 10 MG: 10 TABLET ORAL at 08:47

## 2021-05-18 RX ADMIN — MORPHINE SULFATE 2 MG: 2 INJECTION, SOLUTION INTRAMUSCULAR; INTRAVENOUS at 08:47

## 2021-05-18 RX ADMIN — ENOXAPARIN SODIUM 80 MG: 80 INJECTION SUBCUTANEOUS at 16:22

## 2021-05-18 RX ADMIN — HYDROMORPHONE HYDROCHLORIDE 1 MG: 1 INJECTION, SOLUTION INTRAMUSCULAR; INTRAVENOUS; SUBCUTANEOUS at 21:58

## 2021-05-18 RX ADMIN — MORPHINE SULFATE 2 MG: 2 INJECTION, SOLUTION INTRAMUSCULAR; INTRAVENOUS at 05:03

## 2021-05-18 RX ADMIN — SODIUM CHLORIDE, POTASSIUM CHLORIDE, SODIUM LACTATE AND CALCIUM CHLORIDE 250 ML/HR: 600; 310; 30; 20 INJECTION, SOLUTION INTRAVENOUS at 08:53

## 2021-05-18 RX ADMIN — SODIUM CHLORIDE, POTASSIUM CHLORIDE, SODIUM LACTATE AND CALCIUM CHLORIDE 100 ML/HR: 600; 310; 30; 20 INJECTION, SOLUTION INTRAVENOUS at 16:07

## 2021-05-18 RX ADMIN — CEFTRIAXONE 1 G: 1 INJECTION, POWDER, FOR SOLUTION INTRAMUSCULAR; INTRAVENOUS at 08:47

## 2021-05-18 RX ADMIN — SODIUM CHLORIDE, POTASSIUM CHLORIDE, SODIUM LACTATE AND CALCIUM CHLORIDE 250 ML/HR: 600; 310; 30; 20 INJECTION, SOLUTION INTRAVENOUS at 00:06

## 2021-05-18 RX ADMIN — COLCHICINE 0.6 MG: 0.6 TABLET, FILM COATED ORAL at 09:01

## 2021-05-18 RX ADMIN — ARFORMOTEROL TARTRATE: 15 SOLUTION RESPIRATORY (INHALATION) at 19:30

## 2021-05-18 NOTE — ED PROVIDER NOTES
EMERGENCY DEPARTMENT HISTORY AND PHYSICAL EXAM      Date: 5/17/2021  Patient Name: Digna Phan.    History of Presenting Illness     Chief Complaint   Patient presents with    Dizziness     patient came in by [facility] from home with c/o abdominal pain and dizziness since friday, per [facility] patient was A-fib on monitor, has no hx of A-fib. a previous scan showed that he may have had a stroke unknown to him. denies taking any blood thinners       History Provided By: Patient    HPI: Sienna Villalba Sr., 70 y.o. male with PMHx significant for atrial flutter, hypertension, gout, pancreatitis, alcohol abuse, and metastatic melanoma, treated at [facility] presents to the ED with chief complaint of dizziness for the past 2 days and abdominal pain. Patient initially presented to the [facility] emergency center. He had a head CT which showed possible ischemic stroke of unknown age. He also had abdominal pelvis CT which showed inflammation in the mesentery. He was also noted to be in A. fib and it was unclear whether that was a new finding for him. Lab work at outside facility showed a leukocytosis. He was transferred to THE [facility] ED for admission for work-up of these problems, but no beds were available so he was transferred to the emergency department. On arrival, he complains of severe sharp generalized abdominal pain and is requesting pain medication. He denies nausea or vomiting. Denies chest pain, shortness of breath, palpitations. Does report that he feels dizzy when he walks and has had some blurry vision for the past 2 days which prompted his neuro work-up at the outside facility. Patient has history of alcohol abuse, but states he has not been drinking in the last 2 weeks. PCP: Palomo Guzmán MD    No current facility-administered medications on file prior to encounter.       Current Outpatient Medications on File Prior to Encounter   Medication Sig Dispense Refill    predniSONE (DELTASONE) 10 mg tablet       colchicine 0.6 mg tablet Take 0.6 mg by mouth daily.  allopurinol (ZYLOPRIM) 100 mg tablet Take 1 Tab by mouth daily. 30 Tab 0    fluticasone-vilanterol (BREO ELLIPTA) 100-25 mcg/dose inhaler Take 1 Puff by inhalation daily.  folic acid (FOLVITE) 1 mg tablet Take 1 Tab by mouth daily. 30 Tab 0    propranolol (INDERAL) 10 mg tablet Take 1 Tab by mouth two (2) times a day. 60 Tab 0    Thiamine Mononitrate (B-1) 100 mg tablet Take 1 Tab by mouth daily. 30 Tab 0       Past History     Past Medical History:  Past Medical History:   Diagnosis Date    Atrial flutter ([address]     Hypertension     Ill-defined condition     gout       Past Surgical History:  Past Surgical History:   Procedure Laterality Date    HX APPENDECTOMY      HX OTHER SURGICAL      Cardiac Ablation for A. Flutter 07/2018       Family History:  Family History   Problem Relation Age of Onset    Diabetes Mother     Heart Attack Father        Social History:  Social History     Tobacco Use    Smoking status: Current Every Day Smoker     Packs/day: 1.50    Smokeless tobacco: Former User   Substance Use Topics    Alcohol use: Yes     Alcohol/week: 10.0 standard drinks     Types: 10 Shots of liquor per week    Drug use: No       Allergies:  No Known Allergies      Review of Systems   Review of Systems   Constitutional: Positive for appetite change. Negative for fever. HENT: Negative. Respiratory: Negative for cough, chest tightness, shortness of breath and wheezing. Cardiovascular: Negative for chest pain and palpitations. Gastrointestinal: Positive for abdominal pain. Negative for diarrhea, nausea and vomiting. Genitourinary: Negative for dysuria, flank pain and hematuria. Musculoskeletal: Negative for back pain and neck pain. Skin: Negative for rash and wound. Neurological: Positive for dizziness and light-headedness. Negative for headaches. Psychiatric/Behavioral: Negative for confusion.  The patient is nervous/anxious. All other systems reviewed and are negative.         Physical Exam    General appearance -overweight, appears uncomfortable, moderate pain distress  Eyes - pupils equal and reactive, extraocular eye movements intact  ENT - mucous membranes moist, pharynx normal without lesions  Neck - supple, no significant adenopathy; non-tender to palpation  Chest - clear to auscultation, no wheezes, rales or rhonchi; non-tender to palpation  Heart -irregularly irregular rhythm, normal rate, no murmurs noted  Abdomen -generalized tenderness, moderate distention, no rebound or guarding no masses or organomegaly  Musculoskeletal - no joint tenderness, deformity or swelling; normal ROM  Extremities - peripheral pulses normal, no pedal edema  Skin - normal coloration and turgor, no rashes  Neurological - alert, oriented x3, normal speech, no focal findings or movement disorder noted    Diagnostic Study Results     Labs -     Recent Results (from the past 48 hour(s))   EKG, 12 LEAD, INITIAL    Collection Time: 05/17/21  3:14 AM   Result Value Ref Range    Ventricular Rate 102 BPM    Atrial Rate 104 BPM    QRS Duration 82 ms    Q-T Interval 350 ms    QTC Calculation (Bezet) 456 ms    Calculated R Axis 47 degrees    Calculated T Axis 22 degrees    Diagnosis       Atrial fibrillation with rapid ventricular response  Nonspecific ST abnormality  When compared with ECG of 17-MAR-2021 14:51,  No significant change was found  Confirmed by Eulalia Moncada ([id number]) on 5/17/2021 1:16:59 PM     CBC WITH AUTOMATED DIFF    Collection Time: 05/17/21  3:15 AM   Result Value Ref Range    WBC 18.6 (H) 4.1 - 11.1 K/uL    RBC 5.71 (H) 4.10 - 5.70 M/uL    HGB 17.6 (H) 12.1 - 17.0 g/dL    HCT 52.5 (H) 36.6 - 50.3 %    MCV 91.9 80.0 - 99.0 FL    MCH 30.8 26.0 - 34.0 PG    MCHC 33.5 30.0 - 36.5 g/dL    RDW 13.2 11.5 - 14.5 %    PLATELET 244 434 - 237 K/uL    MPV 10.3 8.9 - 12.9 FL    NRBC 0.0 0  WBC    ABSOLUTE NRBC 0.00 0.00 - 0.01 K/uL    NEUTROPHILS 80 (H) 32 - 75 %    LYMPHOCYTES 9 (L) 12 - 49 %    MONOCYTES 6 5 - 13 %    EOSINOPHILS 1 0 - 7 %    BASOPHILS 1 0 - 1 %    IMMATURE GRANULOCYTES 3 (H) 0.0 - 0.5 %    ABS. NEUTROPHILS 14.8 (H) 1.8 - 8.0 K/UL    ABS. LYMPHOCYTES 1.7 0.8 - 3.5 K/UL    ABS. MONOCYTES 1.1 (H) 0.0 - 1.0 K/UL    ABS. EOSINOPHILS 0.2 0.0 - 0.4 K/UL    ABS. BASOPHILS 0.2 (H) 0.0 - 0.1 K/UL    ABS. IMM. GRANS. 0.6 (H) 0.00 - 0.04 K/UL    DF SMEAR SCANNED      RBC COMMENTS NORMOCYTIC, NORMOCHROMIC     METABOLIC PANEL, COMPREHENSIVE    Collection Time: 05/17/21  3:15 AM   Result Value Ref Range    Sodium 136 136 - 145 mmol/L    Potassium 4.8 3.5 - 5.1 mmol/L    Chloride 103 97 - 108 mmol/L    CO2 27 21 - 32 mmol/L    Anion gap 6 5 - 15 mmol/L    Glucose 90 65 - 100 mg/dL    BUN 25 (H) 6 - 20 MG/DL    Creatinine 1.20 0.70 - 1.30 MG/DL    BUN/Creatinine ratio 21 (H) 12 - 20      GFR est AA >60 >60 ml/min/1.73m2    GFR est non-AA 60 (L) >60 ml/min/1.73m2    Calcium 8.8 8.5 - 10.1 MG/DL    Bilirubin, total 0.6 0.2 - 1.0 MG/DL    ALT (SGPT) 28 12 - 78 U/L    AST (SGOT) 16 15 - 37 U/L    Alk.  phosphatase 126 (H) 45 - 117 U/L    Protein, total 6.9 6.4 - 8.2 g/dL    Albumin 2.7 (L) 3.5 - 5.0 g/dL    Globulin 4.2 (H) 2.0 - 4.0 g/dL    A-G Ratio 0.6 (L) 1.1 - 2.2     LIPASE    Collection Time: 05/17/21  3:15 AM   Result Value Ref Range    Lipase 485 (H) 73 - 393 U/L   CK W/ CKMB & INDEX    Collection Time: 05/17/21  3:15 AM   Result Value Ref Range    CK - MB <1.0 <3.6 NG/ML    CK-MB Index Cannot be calculated 0.0 - 2.5      CK 29 (L) 39 - 308 U/L   TROPONIN I    Collection Time: 05/17/21  3:15 AM   Result Value Ref Range    Troponin-I, Qt. <0.05 <0.05 ng/mL   POC LACTIC ACID    Collection Time: 05/17/21  3:15 AM   Result Value Ref Range    Lactic Acid (POC) 0.92 0.40 - 2.00 mmol/L   HEMOGLOBIN A1C WITH EAG    Collection Time: 05/17/21  3:15 AM   Result Value Ref Range    Hemoglobin A1c 5.1 4.0 - 5.6 %    Est. average glucose 100 mg/dL     Radiologic Studies -   CT HEAD W WO CONT   Final Result   Right posterior parietal infarct. CT ABD PELV WO CONT   Final Result   1. Extensive mesenteric edema in the central mesentery with wall thickening of   the duodenum clearly in the proximal duodenum, third portion the duodenum. While   this may represent acute pancreatitis, less inflammation is centered on the   duodenum and mesentery. Possibilities include an infectious or inflammatory   duodenitis/enteritis. 2.  Previously noted cystic lesions in the pancreas and near completely   resolved. 3.  Findings were discussed with Dr. David Holcomb at the time of dictation      XR CHEST PORT   Final Result   No acute cardiopulmonary process        CT Results  (Last 48 hours)               05/17/21 0719  CT HEAD W WO CONT Final result    Impression:  Right posterior parietal infarct. Narrative:  EXAM: CT HEAD W WO CONT       INDICATION: R/o CVA , metastasis? COMPARISON: None. CONTRAST: 100 mL of Isovue-370. TECHNIQUE:CT of the head was performed prior to and following uneventful rapid   bolus intravenous administration of contrast.  Brain and bone windows were   generated. Coronal and sagittal reformats. CT dose reduction was achieved   through use of a standardized protocol tailored for this examination and   automatic exposure control for dose modulation. FINDINGS:   There is a acute to subacute right posterior parietal ischemic event. There is   some mild mass effect. No Abnormal enhancement, no discrete mass. No shift of the midline extra-axial fluid collection or hemorrhage. Mild atrophy and white matter changes. 05/17/21 0451  CT ABD PELV WO CONT Final result    Impression:  1. Extensive mesenteric edema in the central mesentery with wall thickening of   the duodenum clearly in the proximal duodenum, third portion the duodenum.  While   this may represent acute pancreatitis, less inflammation is centered on the   duodenum and mesentery. Possibilities include an infectious or inflammatory   duodenitis/enteritis. 2.  Previously noted cystic lesions in the pancreas and near completely   resolved. 3.  Findings were discussed with Dr. Abdirizak Schuler at the time of dictation       Narrative:  EXAM: CT ABD PELV WO CONT       INDICATION: r/o recurrent pancreatitis , complications       COMPARISON: 3/17/2021       CONTRAST:  None. TECHNIQUE:    Thin axial images were obtained through the abdomen and pelvis. Coronal and   sagittal reformats were generated. Oral contrast was not administered. CT dose   reduction was achieved through use of a standardized protocol tailored for this   examination and automatic exposure control for dose modulation. The absence of intravenous contrast material reduces the sensitivity for   evaluation of the vasculature and solid organs. FINDINGS:    LOWER THORAX: Dependent atelectasis. No significant abnormality   LIVER: No mass. BILIARY TREE: Gallbladder is within normal limits. CBD is not dilated. SPLEEN: Lobulated. Irregular wedge-shaped hypodensities   PANCREAS: Decreased size of previously noted hypodense cysts in the pancreas. ADRENALS: Unremarkable. KIDNEYS/URETERS: No calculus or hydronephrosis. Simple cyst in left kidney   require no further follow-up. Residual contrast excretion from the bilateral   kidneys   STOMACH: Unremarkable. SMALL BOWEL: Inflammatory changes with mild wall thickening of the proximal   duodenum as well as moderate wall thickening in the third portion the duodenum   as well as the proximal jejunum which is mildly dilated. COLON: Unremarkable   APPENDIX: Not visualized   PERITONEUM: Extensive mesenteric edema. Possible soft tissue mass versus focal   inflammatory changes adjacent to the third portion the duodenum best seen on the   coronal views. This is adjacent to the uncinate process of the pancreas.    RETROPERITONEUM: No lymphadenopathy or aortic aneurysm. REPRODUCTIVE ORGANS: Unremarkable   URINARY BLADDER: No mass or calculus. BONES: No destructive bone lesion. ABDOMINAL WALL: No mass or hernia. ADDITIONAL COMMENTS: N/A               CXR Results  (Last 48 hours)               05/17/21 0328  XR CHEST PORT Final result    Impression:  No acute cardiopulmonary process       Narrative:  EXAM: XR CHEST PORT       INDICATION: Chest pain       COMPARISON: 5/17/2021       FINDINGS: A portable AP radiograph of the chest was obtained at 313 hours. The   patient is on a cardiac monitor. The lungs are clear. The cardiac and   mediastinal contours and pulmonary vascularity are normal.  The bones and soft   tissues are grossly within normal limits. Medical Decision Making   I am the first provider for this patient. I reviewed the vital signs, available nursing notes, past medical history, past surgical history, family history and social history. Vital Signs-Reviewed the patient's vital signs. EKG: At 3:14 AM on May 17, 2021 interpreted by me: Atrial fibrillation with RVR, 102 bpm, normal axis, normal QRS and QTc intervals, nonspecific ST changes    Records Reviewed: Nursing Notes and Old Medical Records    Provider Notes (Medical Decision Making):   Differential diagnosis: Arrhythmia, CVA, TIA, pancreatitis, mesenteric ischemia, UTI, small bowel obstruction, colitis  We will check CBC, CMP, lipase, CPK, troponin, UA and will admit to hospitalist    ED Course:   Initial assessment performed. The patients presenting problems have been discussed, and they are in agreement with the care plan formulated and outlined with them. I have encouraged them to ask questions as they arise throughout their visit.     Progress Notes:  ED Course as of May 18 1829   Mon May 17, 2021   9556 Case discussed with Dr. Cheryle Kite (hospitalist) who will see and admit the patient.    [AO]      ED Course User Index  [AO] Tamica Mandy ECHEVERRIA MD       Disposition:  Admit to hospitalist        Diagnosis     Clinical Impression:   1. Cerebrovascular accident (CVA), unspecified mechanism ([address]    2. Cerebrovascular accident (CVA) due to embolism of right middle cerebral artery ([address]    3. Acute CVA (cerebrovascular accident) ([address]    4. Chronic atrial fibrillation (HCC)    5. Abdominal pain, generalized    6. Atrial fibrillation, unspecified type ([address]    7.  Alcohol-induced acute pancreatitis, unspecified complication status

## 2021-05-18 NOTE — PROGRESS NOTES
Problem: Mobility Impaired (Adult and Pediatric)  Goal: *Acute Goals and Plan of Care (Insert Text)  Description:   FUNCTIONAL STATUS PRIOR TO ADMISSION: Patient was independent and active without use of DME. 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 HOME SUPPORT PRIOR TO ADMISSION: The patient lived with his son but did not require assist.    Physical Therapy Goals  Initiated 5/17/2021  1. Patient will move from supine to sit and sit to supine  in bed with independence within 7 day(s). 2.  Patient will transfer from bed to chair and chair to bed with independence using the least restrictive device within 7 day(s). 3.  Patient will perform sit to stand with modified independence within 7 day(s). 4.  Patient will ambulate with modified independence for 250 feet with the least restrictive device within 7 day(s). 5.  Patient will ascend/descend 5 stairs with 1 handrail(s) with modified independence within 7 day(s). Outcome: Progressing Towards Goal   PHYSICAL THERAPY TREATMENT  Patient: Jessi Leblanc Sr. (75 y.o. male)  Date: 5/18/2021  Diagnosis: A-fib [facility]) [I48.91]  Acute CVA (cerebrovascular accident) ([facility] [I63.9] <principal problem not specified>       Precautions: Fall  Chart, physical therapy assessment, plan of care and goals were reviewed. ASSESSMENT  Patient continues with skilled PT services and is progressing towards goals. Pt presents with irritable affect due to ongoing abdominal pain, distractibility,  decreased knowledge of CVA diagnosis (not provided by therapist). Pt remains NPO with GI following for possible pancreatitis. Pt tolerated amb in room, brief standing activity, and amb on unit with min A overall due to impaired balance, impulsivity, visual field cut, and increased risk for fall. Pt returned to supine at end of session due to abdominal pain. Pt remains well below his indep/ active baseline LOF.  Will benefit from balance/ mobility progression with acute PT. Recommend IPR at d/c as pt demo good rehab potential and will benefit from intensive therapy setting. Current Level of Function Impacting Discharge (mobility/balance): bed mob CGA, sit to stand CGA, amb without device min A    Other factors to consider for discharge: indep/ active baseline, ongoing abdominal pain, new diagnosis CVA         PLAN :  Patient continues to benefit from skilled intervention to address the above impairments. Continue treatment per established plan of care. to address goals. Recommendation for discharge: (in order for the patient to meet his/her long term goals)  Therapy 3 hours per day 5-7 days per week    This discharge recommendation:  Has not yet been discussed the attending provider and/or case management    IF patient discharges home will need the following DME: to be determined (TBD)       SUBJECTIVE:   Patient stated If my stomach didn't hurt, I could hop up and walk all over the place.     OBJECTIVE DATA SUMMARY:   Critical Behavior:  Neurologic State: Alert  Orientation Level: Oriented X4  Cognition: Follows commands, Appropriate for age attention/concentration     Functional Mobility Training:  Bed Mobility:     Supine to Sit: Contact guard assistance              Transfers:  Sit to Stand: Contact guard assistance  Stand to Sit: Contact guard assistance                             Balance:  Sitting: Intact  Standing: Impaired  Standing - Static: Fair  Standing - Dynamic : Fair  Ambulation/Gait Training:  Distance (ft): 100 Feet (ft)  Assistive Device: Gait belt  Ambulation - Level of Assistance: Minimal assistance        Gait Abnormalities: Decreased step clearance; Path deviations              Speed/Saira: Pace decreased (<100 feet/min)  Step Length: Left shortened;Right shortened         Pain Rating:  Pt reports ongoing abdominal pain    Activity Tolerance:   Fair    After treatment patient left in no apparent distress:   Supine in bed, Call bell within reach, Bed / chair alarm activated, and Side rails x 3    COMMUNICATION/COLLABORATION:   The patients plan of care was discussed with: Occupational therapist and Registered nurse.      Richar Bryson, PT   Time Calculation: 25 mins

## 2021-05-18 NOTE — PROGRESS NOTES
Hospitalist Progress Note    NAME: Alvin Sky Sr.   :  1950   MRN:  [id number]       Assessment / Plan:  Acute CVA  Neurology consultation  continue aspirin  No aspirin because of acute pancreatitis   PT OT evaluation    Acute recurrent alcoholic pancreatitis  -NPO   -IV fluid   -pain med   -GI consult      alcohol abuse   -start patient on CIWA     Gout   -continue colchicine     H/o Malignant melanoma in Lung    25.0 - 29.9 Overweight / Body mass index is 25.85 kg/m². Estimated discharge date: May 20  Barriers:    Code status: Full  Prophylaxis: Lovenox  Recommended Disposition: Home w/Family     Subjective:     Chief Complaint / Reason for Physician Visit  \"\". Discussed with RN events overnight. Review of Systems:  Symptom Y/N Comments  Symptom Y/N Comments   Fever/Chills n   Chest Pain n    Poor Appetite    Edema     Cough    Abdominal Pain y    Sputum    Joint Pain     SOB/DANIELS    Pruritis/Rash     Nausea/vomit y   Tolerating PT/OT     Diarrhea    Tolerating Diet n    Constipation y   Other       Could NOT obtain due to:      Objective:     VITALS:   Last 24hrs VS reviewed since prior progress note. Most recent are:  Patient Vitals for the past 24 hrs:   Temp Pulse Resp BP SpO2   21 1150  86      21 1146 98.1 °F (36.7 °C) 73 18 121/74 93 %   21 0752 98.4 °F (36.9 °C) 83 16 114/77 94 %   21 0751  84      21 0305 97.8 °F (36.6 °C) 88 16 122/89 92 %   21 2352 98.5 °F (36.9 °C) 74 18 (!) 112/58 92 %   21 2135     94 %   21 1927 98.3 °F (36.8 °C) 79 18 108/80 92 %   21 1600  95          Intake/Output Summary (Last 24 hours) at 2021 1445  Last data filed at 2021 0305  Gross per 24 hour   Intake 5210 ml   Output 250 ml   Net 4960 ml        I had a face to face encounter and independently examined this patient on 2021, as outlined below:  PHYSICAL EXAM:  General: WD, WN.  Alert, cooperative, no acute distress EENT:  EOMI. Anicteric sclerae. MMM  Resp:  CTA bilaterally, no wheezing or rales. No accessory muscle use  CV:  Regular  rhythm,  No edema  GI:  Soft, Non distended,  Tenderness . +Bowel sounds  Neurologic:  Alert and oriented X 3, normal speech,   Psych:   Good insight. Not anxious nor agitated  Skin:  No rashes. No jaundice    Reviewed most current lab test results and cultures  YES  Reviewed most current radiology test results   YES  Review and summation of old records today    NO  Reviewed patient's current orders and MAR    YES  PMH/SH reviewed - no change compared to H&P  ________________________________________________________________________  Care Plan discussed with:    Comments   Patient y    Family      RN y    Care Manager     Consultant                        Multidiciplinary team rounds were held today with , nursing, pharmacist and clinical coordinator. Patient's plan of care was discussed; medications were reviewed and discharge planning was addressed. ________________________________________________________________________  Total NON critical care TIME:  35  Minutes    Total CRITICAL CARE TIME Spent:   Minutes non procedure based      Comments   >50% of visit spent in counseling and coordination of care y    ________________________________________________________________________  Jason Murcia MD     Procedures: see electronic medical records for all procedures/Xrays and details which were not copied into this note but were reviewed prior to creation of Plan. LABS:  I reviewed today's most current labs and imaging studies. Pertinent labs include:  Recent Labs     05/18/21  0259 05/17/21  0315   WBC 17.8* 18.6*   HGB 16.0 17.6*   HCT 48.8 52.5*    228     Recent Labs     05/17/21  0315      K 4.8      CO2 27   GLU 90   BUN 25*   CREA 1.20   CA 8.8   ALB 2.7*   TBILI 0.6   ALT 28       Signed:  Jason Murcia MD

## 2021-05-18 NOTE — PROGRESS NOTES
Problem: Self Care Deficits Care Plan (Adult)  Goal: *Acute Goals and Plan of Care (Insert Text)  Description: FUNCTIONAL STATUS PRIOR TO ADMISSION: Patient was independent and active without use of DME. 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 HOME SUPPORT: Patient lives with his wife, does not require assist at baseline. Occupational Therapy Goals  Initiated 5/18/2021   1. Patient will perform standing grooming, to include all aspects of container management, with supervision/set-up within 7 day(s). 2.  Patient will perform all aspects of upper body dressing and lower body dressing with supervision/set-up within 7 day(s). 3.  Patient will scan environment to L and R to identify and retrieve 5/5 ADL items with supervision/set-up and minimal verbal cues within 7 day(s). 4.  Patient will perform toilet transfers with supervision/set-up within 7 day(s). 5.  Patient will perform all aspects of toileting with supervision/set-up within 7 day(s). 6.  Patient will participate in more detailed visual-perceptual testing within 7 day(s). 7.  Patient will utilize fall prevention techniques during functional activities with minimal verbal cues within 7 day(s). Outcome: Progressing Towards Goal   OCCUPATIONAL THERAPY EVALUATION  Patient: Jessi Leblanc Sr. (75 y.o. male)  Date: 5/18/2021  Primary Diagnosis: A-fib [facility]) [I48.91]  Acute CVA (cerebrovascular accident) [facility]) [I63.9]        Precautions: Fall    ASSESSMENT  Based on the objective data described below, the patient presents with impaired functional mobility and balance, decreased safety awareness, impulsivity, decreased attention and processing, abdominal pain, and visual-perceptual deficits. Patient requiring significant encouragement to participate this session with patient irritable and demonstrating decreased patience for testing at time of evaluation.  Patient BUEs grossly 5/5, however ataxic with targeted reach testing. Patient with significant visual deficits, scanning all quadrants within very limited range and demonstrating impaired acuity (with and without baseline reading glasses) and peripheral vision. Patient initially somewhat dismissive of education and discussion regarding safety concerns, however ultimately verbalized understanding of need to use call bell and have staff present for all activity / mobility given current balance and vision deficits. Next session, recommend more detailed visual-perceptual and cognitive testing if patient agreeable. Recommend IPR at discharge as patient currently well below baseline, unsafe to return to baseline roles/routines and requiring intensive rehab to improve upon novel deficits s/p CVA. Current Level of Function Impacting Discharge (ADLs/self-care): CGA; mod-max cues for safety and environmental awareness    Functional Outcome Measure:  Patient scored 70/100 on the Barthel Index. Not receptive to formal testing / evaluation at time of assessment. Recommend readdress in future sessions as able. Other factors to consider for discharge: decreased insight     Patient will benefit from skilled therapy intervention to address the above noted impairments. PLAN :  Recommendations and Planned Interventions: self care training, functional mobility training, therapeutic exercise, balance training, visual/perceptual training, therapeutic activities, endurance activities, neuromuscular re-education, patient education, and home safety training    Frequency/Duration: Patient will be followed by occupational therapy 5 times a week to address goals.     Recommendation for discharge: (in order for the patient to meet his/her long term goals)  Therapy 3 hours per day 5-7 days per week    This discharge recommendation:  Has been made in collaboration with the attending provider and/or case management    IF patient discharges home will need the following DME: YUNIOR SUBJECTIVE:   Patient stated i'm not worried about that right now - declining ADLs. OBJECTIVE DATA SUMMARY:   HISTORY:   Past Medical History:   Diagnosis Date    Atrial flutter ([address]     Hypertension     Ill-defined condition     gout     Past Surgical History:   Procedure Laterality Date    HX APPENDECTOMY      HX OTHER SURGICAL      Cardiac Ablation for A. Flutter 07/2018     Expanded or extensive additional review of patient history:     Home Situation  Home Environment: Private residence  # Steps to Enter: 2  One/Two Story Residence: Two story  # of Interior Steps: 12  Living Alone: No  Support Systems: Child(june)  Tub or Shower Type: Tub/Shower combination    Hand dominance: Right    EXAMINATION OF PERFORMANCE DEFICITS:  Cognitive/Behavioral Status:  Neurologic State: Alert;Irritable  Orientation Level: Oriented X4  Cognition: Decreased attention/concentration;Decreased command following; Impaired decision making; Impulsive  Perception: Verbal;Visual;Tactile  Perseveration: No perseveration noted  Safety/Judgement: Decreased awareness of environment;Decreased awareness of need for assistance;Decreased awareness of need for safety;Decreased insight into deficits; Fall prevention    Hearing: Auditory  Auditory Impairment: Hard of hearing, bilateral    Vision/Perceptual:    Tracking: (tracking in limited range, difficulty w/ all quadrants)    Visual Fields: (difficulty detecting stimulus)  Acuity: Impaired near vision; Impaired far vision    Corrective Lenses: Reading glasses(difficulty with normal print w/ glasses)    Range of Motion:  AROM: Generally decreased, functional (limited pronation/supination and elbow extension with wrist / digit movement)    Strength:  Strength:  Within functional limits (BUEs grossly 5/5)    Coordination:  Coordination: Generally decreased, functional (impaired targeted reach using LUE and RUE; increased time and effort to touch therapist hands)    Tone & Sensation:  Tone: Normal  Sensation: Intact(report sensation intact)    Balance:  Sitting: Intact  Standing: Impaired  Standing - Static: Fair  Standing - Dynamic : Fair    Functional Mobility and Transfers for ADLs:  Bed Mobility:  Supine to Sit: Contact guard assistance    Transfers:  Sit to Stand: Contact guard assistance  Stand to Sit: Contact guard assistance  Bathroom Mobility: Contact guard assistance  Toilet Transfer : Contact guard assistance    ADL Assessment:  Feeding: Minimum assistance    Oral Facial Hygiene/Grooming: Minimum assistance    Upper Body Dressing: Minimum assistance    Lower Body Dressing: Minimum assistance    Toileting: Minimum assistance    ADL Intervention and task modifications: Toileting  Toileting Assistance: Contact guard assistance  Bladder Hygiene: Contact guard assistance  Clothing Management: Contact guard assistance    Cognitive Retraining  Orientation Retraining: Awareness of environment; Reorienting;Situation  Problem Solving: Identifying the problem; Awareness of environment  Executive Functions: Executing cognitive plans;Regulating behavior  Safety/Judgement: Decreased awareness of environment;Decreased awareness of need for assistance;Decreased awareness of need for safety;Decreased insight into deficits; Fall prevention    Neuro Assessment:  -Targeted reach exercise: note path deviations with targeted reach using both R and L hands, patient with decreased scanning to L/R requiring cues to scan and locate target prior to reach  -Patient scanning within limited range all quadrants - questionable vision vs behavioral as patient irritable, in pain, and with decreased patience at time of assessment  -Command following and processing: deficits noted with patient requiring simple commands be repeated within 1 minute of first stating; questionable baseline personality trait vs new onset attention deficit    Functional Measure:  Barthel Index:    Bathin  Bladder: 10  Bowels: 10  Grooming: 5  Dressin  Feeding: 10  Mobility: 10  Stairs: 5  Toilet Use: 5  Transfer (Bed to Chair and Back): 10  Total: 70/100        The Barthel ADL Index: Guidelines  1. The index should be used as a record of what a patient does, not as a record of what a patient could do. 2. The main aim is to establish degree of independence from any help, physical or verbal, however minor and for whatever reason. 3. The need for supervision renders the patient not independent. 4. A patient's performance should be established using the best available evidence. Asking the patient, friends/relatives and nurses are the usual sources, but direct observation and common sense are also important. However direct testing is not needed. 5. Usually the patient's performance over the preceding 24-48 hours is important, but occasionally longer periods will be relevant. 6. Middle categories imply that the patient supplies over 50 per cent of the effort. 7. Use of aids to be independent is allowed. Florin Pendleton., Barthel, D.W. (2320). Functional evaluation: the Barthel Index. [address] [facility] (14)2. MICHAEL SaucedoF, Debora Ground., Millicent Byrne., [place], [address] [facility] (). Measuring the change indisability after inpatient rehabilitation; comparison of the responsiveness of the Barthel Index and Functional [place] Measure. Journal of Neurology, Neurosurgery, and Psychiatry, 66(4), 530-431. Niall Busby, JUWAN.J.BLAS, JERRI Escalante, & Vladimir Gan M.A. (2004.) Assessment of post-stroke quality of life in cost-effectiveness studies: The usefulness of the Barthel Index and the EuroQoL-5D.  Quality of Life Research, 15, 401-36         Occupational Therapy Evaluation Charge Determination   History Examination Decision-Making   LOW Complexity : Brief history review  MEDIUM Complexity : 3-5 performance deficits relating to physical, cognitive , or psychosocial skils that result in activity limitations and / or participation restrictions MEDIUM Complexity : Patient may present with comorbidities that affect occupational performnce. Miniml to moderate modification of tasks or assistance (eg, physical or verbal ) with assesment(s) is necessary to enable patient to complete evaluation       Based on the above components, the patient evaluation is determined to be of the following complexity level: LOW   Pain Rating:  Patient with significant abdominal pain reported - RN aware. Activity Tolerance:   Fair, requires rest breaks, and limited by pain    After treatment patient left in no apparent distress:    Supine in bed, Call bell within reach, Bed / chair alarm activated, and Side rails x 3    COMMUNICATION/EDUCATION:   The patients plan of care was discussed with: Physical therapist and Registered nurse. Patient was educated regarding his deficit(s) of visual deficits, impaired balance, and decreased safety awareness as this relates to his diagnosis of R posterior parietal infarct. He demonstrated Guarded understanding as evidenced by verbalization. Patient and/or family was verbally educated on the BE FAST acronym for signs/symptoms of CVA and TIA. BE FAST was written on patient's communication board  for visual education and reinforcement. All questions answered with patient indicating fair understanding. Home safety education was provided and the patient/caregiver indicated understanding., Patient/family have participated as able in goal setting and plan of care. , and Patient/family agree to work toward stated goals and plan of care.     Thank you for this referral.  Yoel Craig OT  Time Calculation: 27 mins

## 2021-05-18 NOTE — PROGRESS NOTES
Speech Pathology  Patient politely refused SLP tx given abdominal pain. SLP will defer and continue to follow.

## 2021-05-18 NOTE — PROGRESS NOTES
Neurology Note    Patient ID:  Peter Tello  [id number]  70 y.o.  1950      Date of Consultation:  May 18, 2021      Subjective: my vision is a bit better       History of Present Illness:   Peter Pritchard is a 70 y.o. male who presented to an outside emergency room with symptoms of abdominal discomfort, dizziness, and lightheadedness. There is also been blurry vision. The patient reported that the symptoms had been going on for approximately 2+ days prior to presenting to outside emergency room. At the outside emergency room, he was noted to have a possible occipital stroke in evolution on head CT. He was found to have  atrial fibrillation seen on EKG. He was transferred to [facility].  Since arrival, he does feel that his vision has improved a slight bit. He is still suffering with a significant amount of abdominal pain. GI  is now following the patient due to his pancreatitis. There is no new numbness, tingling, or weakness. Past Medical History:   Diagnosis Date    Atrial flutter ([address]     Hypertension     Ill-defined condition     gout      Melanoma. Past Surgical History:   Procedure Laterality Date    HX APPENDECTOMY      HX OTHER SURGICAL      Cardiac Ablation for A. Flutter 07/2018        Family History   Problem Relation Age of Onset    Diabetes Mother     Heart Attack Father         Social History     Tobacco Use    Smoking status: Current Every Day Smoker     Packs/day: 1.50    Smokeless tobacco: Former User   Substance Use Topics    Alcohol use:  Yes     Alcohol/week: 10.0 standard drinks     Types: 10 Shots of liquor per week        No Known Allergies       Current Facility-Administered Medications   Medication Dose Route Frequency    lactated Ringers infusion  100 mL/hr IntraVENous CONTINUOUS    colchicine tablet 0.6 mg  0.6 mg Oral DAILY    arformoterol 15 mcg/budesonide 0.25 mg neb solution   Nebulization BID    propranoloL (INDERAL) tablet 10 mg  10 mg Oral BID    acetaminophen (TYLENOL) tablet 650 mg  650 mg Oral Q4H PRN    Or    acetaminophen (TYLENOL) solution 650 mg  650 mg Per NG tube Q4H PRN    Or    acetaminophen (TYLENOL) suppository 650 mg  650 mg Rectal Q4H PRN    aspirin chewable tablet 81 mg  81 mg Oral DAILY    atorvastatin (LIPITOR) tablet 40 mg  40 mg Oral QHS    labetaloL (NORMODYNE;TRANDATE) injection 5 mg  5 mg IntraVENous Q10MIN PRN    enoxaparin (LOVENOX) injection 80 mg  1 mg/kg SubCUTAneous Q12H    pantoprazole (PROTONIX) tablet 40 mg  40 mg Oral ACB    cefTRIAXone (ROCEPHIN) 1 g in 0.9% sodium chloride (MBP/ADV) 50 mL MBP  1 g IntraVENous Q24H    metroNIDAZOLE (FLAGYL) IVPB premix 500 mg  500 mg IntraVENous Q12H    lactated Ringers infusion  250 mL/hr IntraVENous CONTINUOUS    morphine injection 2 mg  2 mg IntraVENous Q4H PRN       Review of Systems:    General, constitutional: negative  Eyes, vision: negative  Ears, nose, throat: negative  Cardiovascular, heart: negative  Respiratory: negative  Gastrointestinal: abd. pain  Genitourinary: negative  Musculoskeletal: negative  Skin and integumentary: negative  Psychiatric: negative  Endocrine: negative  Neurological: negative, except for HPI  Hematologic/lymphatic: negative  Allergy/immunology: negative    Objective:     Visit Vitals  /74   Pulse 86   Temp 98.1 °F (36.7 °C)   Resp 18   Ht 5' 9\" (1.753 m)   Wt 175 lb 0.7 oz (79.4 kg)   SpO2 93%   BMI 25.85 kg/m²       Physical Exam:    General:  appears well nourished in mild discomfort  Neck: no carotid bruits  Lungs: clear to auscultation  Heart:  no murmurs, regular rate  Lower extremity: peripheral pulses palpable and no edema  Skin: intact    Neurological exam:    Awake, alert, oriented to person, place and time  Recent and remote memory were normal  Attention and concentration were intact  Language was intact.   There was no aphasia  Speech: no dysarthria  Fund of knowledge was preserved    Cranial nerves:   II-XII were tested    Perrrla  Incomplete homonymous field cut on the left. Eomi, no evidence of nystagmus  Facial sensation:  normal and symmetric  Facial motor: normal and symmetric  Hearing intact  SCM strength intact  Tongue: midline without fasciculations    Motor: Tone normal  Mild left pronator drift    No evidence of fasciculations    Strength testing:   deltoid triceps biceps Wrist ext. Wrist flex. intrinsics Hip flex. Hip ext. Knee ext. Knee flex Dorsi flex Plantar flex   Right 5 5 5 5 5 5 5 5 5 5 5 5   Left 4 5 5 5 5 5 5 5 5 5 5 5       Sensory:  Upper extremity: intact to pp  Lower extremity: intact to pp    Reflexes:    Right Left  Biceps  2 2  Triceps 2 2  Brachiorad. 2 2  Patella  2 2  Achilles 2 2    Plantar response:  Extensor on the left      Cerebellar testing:  no tremor apparent, finger/nose and jacquie were dysmetric on the left. Labs:     Lab Results   Component Value Date/Time    Hemoglobin A1c 5.1 05/17/2021 03:15 AM    Sodium 136 05/17/2021 03:15 AM    Potassium 4.8 05/17/2021 03:15 AM    Chloride 103 05/17/2021 03:15 AM    Glucose 90 05/17/2021 03:15 AM    BUN 25 (H) 05/17/2021 03:15 AM    Creatinine 1.20 05/17/2021 03:15 AM    Calcium 8.8 05/17/2021 03:15 AM    WBC 17.8 (H) 05/18/2021 02:59 AM    HCT 48.8 05/18/2021 02:59 AM    HGB 16.0 05/18/2021 02:59 AM    PLATELET 604 93/69/1153 02:59 AM       Imaging:    No results found for this or any previous visit. Results from [place] encounter on 05/17/21   CT HEAD W WO CONT    Narrative EXAM: CT HEAD W WO CONT    INDICATION: R/o CVA , metastasis? COMPARISON: None. CONTRAST: 100 mL of Isovue-370. TECHNIQUE:CT of the head was performed prior to and following uneventful rapid  bolus intravenous administration of contrast.  Brain and bone windows were  generated. Coronal and sagittal reformats.  CT dose reduction was achieved  through use of a standardized protocol tailored for this examination and  automatic exposure control for dose modulation. FINDINGS:  There is a acute to subacute right posterior parietal ischemic event. There is  some mild mass effect. No Abnormal enhancement, no discrete mass. No shift of the midline extra-axial fluid collection or hemorrhage. Mild atrophy and white matter changes. Impression Right posterior parietal infarct. head CT from 5/17/2021. There was evidence of a right posterior parietal lobe stroke. There was no hemorrhagic component. Assessment and Plan:    The patient is a pleasant 66-year-old gentleman with acute onset of dizziness and visual difficulties. His CT scan does show an acute to subacute stroke in the right posterior parietal lobe. He was found to have atrial fibrillation on telemetry. Acute stroke:    His risk factors for stroke include atrial fibrillation, smoking, htn    The patient cannot receive an MRI due to prior metal shrapnel. Hemoglobin A1c at goal.    He has been started on Lipitor. It appears the lipid panel was not drawn. I will place a new order. Continue current therapy for now. If LDL is less than 70, statin can be stopped. Echocardiogram completed    I would recommend anticoagulation given the embolic nature to the stroke given his history of atrial fibrillation. He has been started on Lovenox with plan to switch to Eliquis. Htn: aggressive control with goal sbp < 140    I did discuss the importance of smoking cessation. physical therapy, Occupational Therapy, and speech therapy. I provided stroke education today in regards to risk factors for stroke and lifestyle modifications to help minimize the risk of future stroke. This included medication compliance, regular follow up with primary care physician,  and healthy lifestyle habits (nutrition/exercise)    Concern for abdominal inflammation/infection:  Pancreatitis versus enteritis  This is being followed by the primary team and GI.          Neurology will continue to follow along closely     Active Problems:    H/O atrial flutter (8/23/2018)      A-fib ([address] (5/17/2021)      Acute [address]A [facility]cerebro[facility] [address]) [address] (5/17/2021)      Abdominal pain (5/17/2021)      Acute pancreatitis (5/17/2021)        I spent  35   minutes providing care to this  acutely ill inpatient with > 50% of the time counseling and assisting in the coordination of care of the patient on the patient's hospital floor/unit.                   Signed By:  Tamar Godoy DO FAAN    May 18, 2021

## 2021-05-18 NOTE — PROGRESS NOTES
Occupational Therapy    Orders received, chart reviewed and patient evaluated by occupational therapy. Pending progression with skilled acute occupational therapy, recommend:  Therapy 3 hours per day 5-7 days per week     Recommend with nursing ADLs with supervision/setup, OOB to chair 3x/day and toileting via functional mobility to and from bathroom with one-person assist. Thank you for completing as able in order to maintain patient strength, endurance and independence. Full evaluation to follow.      Emeli Donnelly OTR/L

## 2021-05-18 NOTE — PROGRESS NOTES
End of Shift Note    Bedside shift change report given to Lizeth Keys (oncoming nurse) by Samson Pacheco, RN (offgoing nurse). Report included the following information SBAR    Shift worked:  DAYS   Shift summary and any significant changes:     -ABDOMINAL PAIN -> PRN DILAUDID   -NPO PER GI   -WIFE WANTS CONSTANT UPDATES     Concerns for physician to address:  NONE   Zone phone for oncoming shift:   [phone]     Patient Information  Evan Mckay Sr.  70 y.o.  5/17/2021  2:31 AM by Segundo Narayanan MD. Evan Mckay Sr. was admitted from Home    Problem List  Patient Active Problem List    Diagnosis Date Noted    [facility]-fib [facility]) 05/17/2021    [facility]) 05/17/2021    Abdominal pain 05/17/2021    Acute pancreatitis 05/17/2021    Dehydration 08/24/2018    ARF (acute renal failure) ([facility]) 08/23/2018    Gout 08/23/2018    Hypotension 08/23/2018    H/O atrial flutter 08/23/2018    Severe sepsis ([facility]) 07/01/2018     Past Medical History:   Diagnosis Date    Atrial flutter ([facility])     Hypertension     Ill-defined condition     gout       Core Measures:  CVA: Yes Yes  CHF:No Not applicable    Activity:  Activity Level: Up with Assistance, Bath Room Privileges  Number times ambulated in hallways past shift: 1  Number of times OOB to chair past shift: 3    Cardiac:   Cardiac Monitoring: Yes      Cardiac Rhythm: Atrial Fib    Access:   Current line(s): PIV     Genitourinary:   Urinary status: voiding  Urinary Catheter? No  Respiratory:   O2 Device: None (Room air)  Chronic home O2 use?: NO  Incentive spirometer at bedside: N/A       GI:     Current diet:  No diet orders on file  Passing flatus: YES  Tolerating current diet: YES       Pain Management:   Patient states pain is manageable on current regimen: YES    Skin:  Mike Score: 21  Interventions: increase time out of bed    Patient Safety:  Fall Score:  Total Score: 1  Interventions: bed/chair alarm, assistive device (walker, cane, etc), gripper socks and pt to call before getting OOB     @Rollbelt  @dexterity to release roll belt  Yes/No ( must document dexterity  here by stating Yes or No here, otherwise this is a restraint and must follow restraint documentation policy.)    DVT prophylaxis:  DVT prophylaxis Med- YES    Active Consults:  IP CONSULT TO NEUROLOGY  IP CONSULT TO GASTROENTEROLOGY    Length of Stay:  Expected LOS: 4d 9h  Actual LOS: 1  Discharge Plan: Yes Katty Price RN

## 2021-05-18 NOTE — PROGRESS NOTES
Gastroenterology Progress Note  GARCÍA Ivy   for Dr. Lisandra Galdamez    5/18/2021    Admit Date: 5/17/2021    Subjective: Follow up for:  1)  Recurrent acute pancreatitis    2) Alcohol abuse    3) CVA    Patient is on NPO. Pain: Patient complains of abdominal pain yes. \"it can't get no worse than this\". Bowel Movements: None    There is no bleeding    Lipase increased to 593 from 485, WBC tredning down from 18.6 yesterday to 17.8 today      Current Facility-Administered Medications   Medication Dose Route Frequency    colchicine tablet 0.6 mg  0.6 mg Oral DAILY    arformoterol 15 mcg/budesonide 0.25 mg neb solution   Nebulization BID    propranoloL (INDERAL) tablet 10 mg  10 mg Oral BID    acetaminophen (TYLENOL) tablet 650 mg  650 mg Oral Q4H PRN    Or    acetaminophen (TYLENOL) solution 650 mg  650 mg Per NG tube Q4H PRN    Or    acetaminophen (TYLENOL) suppository 650 mg  650 mg Rectal Q4H PRN    aspirin chewable tablet 81 mg  81 mg Oral DAILY    atorvastatin (LIPITOR) tablet 40 mg  40 mg Oral QHS    labetaloL (NORMODYNE;TRANDATE) injection 5 mg  5 mg IntraVENous Q10MIN PRN    enoxaparin (LOVENOX) injection 80 mg  1 mg/kg SubCUTAneous Q12H    pantoprazole (PROTONIX) tablet 40 mg  40 mg Oral ACB    cefTRIAXone (ROCEPHIN) 1 g in 0.9% sodium chloride (MBP/ADV) 50 mL MBP  1 g IntraVENous Q24H    metroNIDAZOLE (FLAGYL) IVPB premix 500 mg  500 mg IntraVENous Q12H    lactated Ringers infusion  250 mL/hr IntraVENous CONTINUOUS    morphine injection 2 mg  2 mg IntraVENous Q4H PRN        Objective:     Blood pressure 114/77, pulse 83, temperature 98.4 °F (36.9 °C), resp. rate 16, height 5' 9\" (1.753 m), weight 79.4 kg (175 lb 0.7 oz), SpO2 94 %. No intake/output data recorded.     05/16 1901 - 05/18 0700  In: 5210 [I.V.:5210]  Out: 250 [Urine:250]    EXAM:   GEN: Unkempt WM, NAD  HEENT: NCAT  Heart: RRR  Lungs; CTA  Abdomen: mild epigastric distention, significantly tender in epigastrium and LUQ with guarding, no rebound, normal BS  Ext: No edema    Data Review    Recent Results (from the past 24 hour(s))   ECHO ADULT COMPLETE    Collection Time: 05/17/21  2:02 PM   Result Value Ref Range    IVSd 1.08 (A) 0.60 - 1.00 cm    LVIDd 5.36 4.20 - 5.90 cm    LVIDs 4.26 cm    LVOT d 2.08 cm    LVPWd 1.11 (A) 0.60 - 1.00 cm    LV Ejection Fraction MOD 4C 29 percent    LV ED Vol A4C 74.32 mL    LV ES Vol A4C 52.40 mL    LVOT Peak Gradient 2.77 mmHg    LVOT Peak Velocity 83.15 cm/s    RVIDd 3.45 cm    RVSP 25.99 mmHg    LA Area 4C 16.16 cm2    LA Vol 4C 44.59 18.00 - 58.00 mL    Left Atrium to Aortic Root Ratio 0.92     Est. RA Pressure 10.00 mmHg    Pulmonic Valve Systolic Peak Instantaneous Gradient 1.51 mmHg    Pulmonic Regurgitant End Max Velocity 61.37 cm/s    Triscuspid Valve Regurgitation Peak Gradient 15.99 mmHg    TR Max Velocity 199.92 cm/s    LV Mass .5 88.0 - 224.0 g    LV Mass AL Index 118.1 49.0 - 115.0 g/m2    LA Vol Index 22.84 16.00 - 28.00 ml/m2    LVED Vol Index A4C 38.1 mL/m2    LVES Vol Index A4C 26.8 mL/m2   ETHYL ALCOHOL    Collection Time: 05/18/21  2:59 AM   Result Value Ref Range    ALCOHOL(ETHYL),SERUM <10 <10 MG/DL   CBC W/O DIFF    Collection Time: 05/18/21  2:59 AM   Result Value Ref Range    WBC 17.8 (H) 4.1 - 11.1 K/uL    RBC 5.23 4. 10 - 5.70 M/uL    HGB 16.0 12.1 - 17.0 g/dL    HCT 48.8 36.6 - 50.3 %    MCV 93.3 80.0 - 99.0 FL    MCH 30.6 26.0 - 34.0 PG    MCHC 32.8 30.0 - 36.5 g/dL    RDW 13.0 11.5 - 14.5 %    PLATELET 838 808 - 435 K/uL    MPV 9.7 8.9 - 12.9 FL    NRBC 0.0 0  WBC    ABSOLUTE NRBC 0.00 0.00 - 0.01 K/uL   LIPASE    Collection Time: 05/18/21  2:59 AM   Result Value Ref Range    Lipase 593 (H) 73 - 393 U/L     Recent Labs     05/18/21  0259 05/17/21 0315   WBC 17.8* 18.6*   HGB 16.0 17.6*   HCT 48.8 52.5*    228     Recent Labs     05/17/21 0315      K 4.8      CO2 27   BUN 25*   CREA 1.20   GLU 90   CA 8.8     Recent Labs     05/18/21  0259 05/17/21  0315   ALT  --  28   AP  --  126*   TBILI  --  0.6   TP  --  6.9   ALB  --  2.7*   GLOB  --  4.2*   LPSE 593* 485*     No results for input(s): INR, PTP, APTT, INREXT in the last 72 hours. No results for input(s): FE, TIBC, PSAT, FERR in the last 72 hours. No results found for: FOL, RBCF   No results for input(s): PH, PCO2, PO2 in the last 72 hours. Recent Labs     05/17/21  0315   CPK 29*   CKNDX Cannot be calculated   TROIQ <0.05     No results found for: CHOL, CHOLX, CHLST, CHOLV, HDL, HDLP, LDL, LDLC, DLDLP, TGLX, TRIGL, TRIGP, CHHD, CHHDX  No results found for: [facility]  Lab Results   Component Value Date/Time    Color YELLOW/STRAW 05/17/2021 09:50 AM    Appearance CLEAR 05/17/2021 09:50 AM    Specific gravity 1.015 05/17/2021 09:50 AM    Specific gravity 1.017 08/22/2018 07:48 PM    pH (UA) 5.0 05/17/2021 09:50 AM    Protein TRACE (A) 05/17/2021 09:50 AM    Glucose Negative 05/17/2021 09:50 AM    Ketone Negative 05/17/2021 09:50 AM    Bilirubin Negative 05/17/2021 09:50 AM    Urobilinogen 1.0 05/17/2021 09:50 AM    Nitrites Negative 05/17/2021 09:50 AM    Leukocyte Esterase Negative 05/17/2021 09:50 AM    Epithelial cells FEW 05/17/2021 09:50 AM    Bacteria Negative 05/17/2021 09:50 AM    WBC 5-10 05/17/2021 09:50 AM    RBC 0-5 05/17/2021 09:50 AM           Assessment:     Active Problems:    H/O atrial flutter (8/23/2018)      A-fib ([facility] [address] (5/17/2021)      Acute CVA (cerebrovascular accident) ([facility] [address] (5/17/2021)      Abdominal pain (5/17/2021)      Acute pancreatitis (5/17/2021)        Plan:   Lipase is continuing to trend up and pain is also persistent, continue to monitor lipase daily. Would hold off on diet advancement. Pain management per primary care team.  Continue IVF replacement. Will follow closely. May consider repeat imaging next Monday pending progress to evaluate for complications from pancreatitis. WBC trending down and afebrile.   Spoke with pt about stopping EtOH 100%, he understands.     GARCÍA Horta    05/18/21  10:54 AM  [address] [facility], [address] [facility]  [id number] [address] [address] [place]: [phone]

## 2021-05-18 NOTE — PROGRESS NOTES
Bedside and Verbal shift change report given to Citi (oncoming nurse) by Sylvia caro). Report included the following information SBAR, Intake/Output, MAR, Recent Results and Cardiac Rhythm a fib.     0800 spoke with pt's wife Hermelinda via telephone to update on pt condition and plan of care.

## 2021-05-19 ENCOUNTER — APPOINTMENT (OUTPATIENT)
Dept: ULTRASOUND IMAGING | Age: 71
DRG: 064 | End: 2021-05-19
Attending: PHYSICIAN ASSISTANT
Payer: MEDICARE

## 2021-05-19 ENCOUNTER — APPOINTMENT (OUTPATIENT)
Dept: VASCULAR SURGERY | Age: 71
DRG: 064 | End: 2021-05-19
Attending: PSYCHIATRY & NEUROLOGY
Payer: MEDICARE

## 2021-05-19 ENCOUNTER — APPOINTMENT (OUTPATIENT)
Dept: CT IMAGING | Age: 71
DRG: 064 | End: 2021-05-19
Attending: INTERNAL MEDICINE
Payer: MEDICARE

## 2021-05-19 LAB
ALBUMIN SERPL-MCNC: 2.5 G/DL (ref 3.5–5)
ALBUMIN/GLOB SERPL: 0.8 {RATIO} (ref 1.1–2.2)
ALP SERPL-CCNC: 131 U/L (ref 45–117)
ALT SERPL-CCNC: 19 U/L (ref 12–78)
ANION GAP SERPL CALC-SCNC: 9 MMOL/L (ref 5–15)
AST SERPL-CCNC: 21 U/L (ref 15–37)
BASOPHILS # BLD: 0.1 K/UL (ref 0–0.1)
BASOPHILS NFR BLD: 0 % (ref 0–1)
BILIRUB SERPL-MCNC: 1.1 MG/DL (ref 0.2–1)
BUN SERPL-MCNC: 23 MG/DL (ref 6–20)
BUN/CREAT SERPL: 29 (ref 12–20)
CALCIUM SERPL-MCNC: 8.1 MG/DL (ref 8.5–10.1)
CHLORIDE SERPL-SCNC: 103 MMOL/L (ref 97–108)
CO2 SERPL-SCNC: 24 MMOL/L (ref 21–32)
CREAT SERPL-MCNC: 0.79 MG/DL (ref 0.7–1.3)
DIFFERENTIAL METHOD BLD: ABNORMAL
EOSINOPHIL # BLD: 0.1 K/UL (ref 0–0.4)
EOSINOPHIL NFR BLD: 0 % (ref 0–7)
ERYTHROCYTE [DISTWIDTH] IN BLOOD BY AUTOMATED COUNT: 13 % (ref 11.5–14.5)
GLOBULIN SER CALC-MCNC: 3.3 G/DL (ref 2–4)
GLUCOSE SERPL-MCNC: 77 MG/DL (ref 65–100)
HCT VFR BLD AUTO: 49.8 % (ref 36.6–50.3)
HGB BLD-MCNC: 16.9 G/DL (ref 12.1–17)
IMM GRANULOCYTES # BLD AUTO: 0.4 K/UL (ref 0–0.04)
IMM GRANULOCYTES NFR BLD AUTO: 2 % (ref 0–0.5)
LIPASE SERPL-CCNC: 2220 U/L (ref 73–393)
LYMPHOCYTES # BLD: 0.8 K/UL (ref 0.8–3.5)
LYMPHOCYTES NFR BLD: 4 % (ref 12–49)
MCH RBC QN AUTO: 31.1 PG (ref 26–34)
MCHC RBC AUTO-ENTMCNC: 33.9 G/DL (ref 30–36.5)
MCV RBC AUTO: 91.7 FL (ref 80–99)
MONOCYTES # BLD: 1 K/UL (ref 0–1)
MONOCYTES NFR BLD: 5 % (ref 5–13)
NEUTS SEG # BLD: 18.9 K/UL (ref 1.8–8)
NEUTS SEG NFR BLD: 89 % (ref 32–75)
NRBC # BLD: 0 K/UL (ref 0–0.01)
NRBC BLD-RTO: 0 PER 100 WBC
PLATELET # BLD AUTO: 186 K/UL (ref 150–400)
PMV BLD AUTO: 9.8 FL (ref 8.9–12.9)
POTASSIUM SERPL-SCNC: 4.4 MMOL/L (ref 3.5–5.1)
PROCALCITONIN SERPL-MCNC: 1.21 NG/ML
PROT SERPL-MCNC: 5.8 G/DL (ref 6.4–8.2)
RBC # BLD AUTO: 5.43 M/UL (ref 4.1–5.7)
SODIUM SERPL-SCNC: 136 MMOL/L (ref 136–145)
WBC # BLD AUTO: 21.3 K/UL (ref 4.1–11.1)

## 2021-05-19 PROCEDURE — 93880 EXTRACRANIAL BILAT STUDY: CPT

## 2021-05-19 PROCEDURE — 74011250636 HC RX REV CODE- 250/636: Performed by: INTERNAL MEDICINE

## 2021-05-19 PROCEDURE — 76700 US EXAM ABDOM COMPLETE: CPT

## 2021-05-19 PROCEDURE — 74011250636 HC RX REV CODE- 250/636: Performed by: STUDENT IN AN ORGANIZED HEALTH CARE EDUCATION/TRAINING PROGRAM

## 2021-05-19 PROCEDURE — 2709999900 HC NON-CHARGEABLE SUPPLY

## 2021-05-19 PROCEDURE — 74011000258 HC RX REV CODE- 258: Performed by: STUDENT IN AN ORGANIZED HEALTH CARE EDUCATION/TRAINING PROGRAM

## 2021-05-19 PROCEDURE — 74011250637 HC RX REV CODE- 250/637: Performed by: INTERNAL MEDICINE

## 2021-05-19 PROCEDURE — 74177 CT ABD & PELVIS W/CONTRAST: CPT

## 2021-05-19 PROCEDURE — 94640 AIRWAY INHALATION TREATMENT: CPT

## 2021-05-19 PROCEDURE — 83690 ASSAY OF LIPASE: CPT

## 2021-05-19 PROCEDURE — 74011000636 HC RX REV CODE- 636: Performed by: INTERNAL MEDICINE

## 2021-05-19 PROCEDURE — 99232 SBSQ HOSP IP/OBS MODERATE 35: CPT | Performed by: PSYCHIATRY & NEUROLOGY

## 2021-05-19 PROCEDURE — 74011250636 HC RX REV CODE- 250/636: Performed by: SPECIALIST

## 2021-05-19 PROCEDURE — 74011000258 HC RX REV CODE- 258: Performed by: PHYSICIAN ASSISTANT

## 2021-05-19 PROCEDURE — 74011000250 HC RX REV CODE- 250: Performed by: INTERNAL MEDICINE

## 2021-05-19 PROCEDURE — 74011250636 HC RX REV CODE- 250/636: Performed by: PHYSICIAN ASSISTANT

## 2021-05-19 PROCEDURE — 65660000000 HC RM CCU STEPDOWN

## 2021-05-19 PROCEDURE — 80053 COMPREHEN METABOLIC PANEL: CPT

## 2021-05-19 PROCEDURE — 85025 COMPLETE CBC W/AUTO DIFF WBC: CPT

## 2021-05-19 PROCEDURE — 74011250637 HC RX REV CODE- 250/637: Performed by: SPECIALIST

## 2021-05-19 PROCEDURE — 36415 COLL VENOUS BLD VENIPUNCTURE: CPT

## 2021-05-19 PROCEDURE — 84145 PROCALCITONIN (PCT): CPT

## 2021-05-19 RX ORDER — FAMOTIDINE 10 MG/ML
20 INJECTION INTRAVENOUS EVERY 12 HOURS
Status: DISCONTINUED | OUTPATIENT
Start: 2021-05-19 | End: 2021-05-22 | Stop reason: HOSPADM

## 2021-05-19 RX ORDER — ONDANSETRON 2 MG/ML
4 INJECTION INTRAMUSCULAR; INTRAVENOUS
Status: DISCONTINUED | OUTPATIENT
Start: 2021-05-19 | End: 2021-05-22 | Stop reason: HOSPADM

## 2021-05-19 RX ADMIN — HYDROMORPHONE HYDROCHLORIDE 1 MG: 1 INJECTION, SOLUTION INTRAMUSCULAR; INTRAVENOUS; SUBCUTANEOUS at 23:28

## 2021-05-19 RX ADMIN — ARFORMOTEROL TARTRATE: 15 SOLUTION RESPIRATORY (INHALATION) at 19:25

## 2021-05-19 RX ADMIN — LACTULOSE 30 ML: 20 SOLUTION ORAL at 10:29

## 2021-05-19 RX ADMIN — FAMOTIDINE 20 MG: 10 INJECTION, SOLUTION INTRAVENOUS at 20:59

## 2021-05-19 RX ADMIN — HYDROMORPHONE HYDROCHLORIDE 1 MG: 1 INJECTION, SOLUTION INTRAMUSCULAR; INTRAVENOUS; SUBCUTANEOUS at 07:58

## 2021-05-19 RX ADMIN — SODIUM CHLORIDE, POTASSIUM CHLORIDE, SODIUM LACTATE AND CALCIUM CHLORIDE 100 ML/HR: 600; 310; 30; 20 INJECTION, SOLUTION INTRAVENOUS at 08:00

## 2021-05-19 RX ADMIN — IOPAMIDOL 100 ML: 755 INJECTION, SOLUTION INTRAVENOUS at 15:19

## 2021-05-19 RX ADMIN — HYDROMORPHONE HYDROCHLORIDE 1 MG: 1 INJECTION, SOLUTION INTRAMUSCULAR; INTRAVENOUS; SUBCUTANEOUS at 06:49

## 2021-05-19 RX ADMIN — ENOXAPARIN SODIUM 80 MG: 80 INJECTION SUBCUTANEOUS at 06:49

## 2021-05-19 RX ADMIN — METHYLPREDNISOLONE SODIUM SUCCINATE 120 MG: 40 INJECTION, POWDER, FOR SOLUTION INTRAMUSCULAR; INTRAVENOUS at 17:26

## 2021-05-19 RX ADMIN — SODIUM CHLORIDE, POTASSIUM CHLORIDE, SODIUM LACTATE AND CALCIUM CHLORIDE 200 ML/HR: 600; 310; 30; 20 INJECTION, SOLUTION INTRAVENOUS at 17:28

## 2021-05-19 RX ADMIN — HYDROMORPHONE HYDROCHLORIDE 1 MG: 1 INJECTION, SOLUTION INTRAMUSCULAR; INTRAVENOUS; SUBCUTANEOUS at 16:42

## 2021-05-19 RX ADMIN — CEFTRIAXONE 1 G: 1 INJECTION, POWDER, FOR SOLUTION INTRAMUSCULAR; INTRAVENOUS at 10:32

## 2021-05-19 RX ADMIN — COLCHICINE 0.6 MG: 0.6 TABLET, FILM COATED ORAL at 09:00

## 2021-05-19 RX ADMIN — PROPRANOLOL HYDROCHLORIDE 10 MG: 10 TABLET ORAL at 17:26

## 2021-05-19 RX ADMIN — ARFORMOTEROL TARTRATE: 15 SOLUTION RESPIRATORY (INHALATION) at 09:30

## 2021-05-19 RX ADMIN — SODIUM CHLORIDE, POTASSIUM CHLORIDE, SODIUM LACTATE AND CALCIUM CHLORIDE 200 ML/HR: 600; 310; 30; 20 INJECTION, SOLUTION INTRAVENOUS at 23:30

## 2021-05-19 RX ADMIN — MEROPENEM 500 MG: 500 INJECTION, POWDER, FOR SOLUTION INTRAVENOUS at 23:30

## 2021-05-19 RX ADMIN — IOHEXOL 50 ML: 240 INJECTION, SOLUTION INTRATHECAL; INTRAVASCULAR; INTRAVENOUS; ORAL at 13:18

## 2021-05-19 RX ADMIN — POLYETHYLENE GLYCOL 3350 17 G: 17 POWDER, FOR SOLUTION ORAL at 10:29

## 2021-05-19 RX ADMIN — PROPRANOLOL HYDROCHLORIDE 10 MG: 10 TABLET ORAL at 10:28

## 2021-05-19 RX ADMIN — POLYETHYLENE GLYCOL 3350 17 G: 17 POWDER, FOR SOLUTION ORAL at 17:26

## 2021-05-19 RX ADMIN — ENOXAPARIN SODIUM 80 MG: 80 INJECTION SUBCUTANEOUS at 16:42

## 2021-05-19 RX ADMIN — PANTOPRAZOLE SODIUM 40 MG: 40 TABLET, DELAYED RELEASE ORAL at 10:28

## 2021-05-19 RX ADMIN — MEROPENEM 500 MG: 500 INJECTION, POWDER, FOR SOLUTION INTRAVENOUS at 17:32

## 2021-05-19 RX ADMIN — ASPIRIN 81 MG: 81 TABLET, CHEWABLE ORAL at 10:28

## 2021-05-19 RX ADMIN — ONDANSETRON 4 MG: 2 INJECTION INTRAMUSCULAR; INTRAVENOUS at 11:52

## 2021-05-19 NOTE — PROGRESS NOTES
Wife called demanding to\" have updates now\". Paged MD Genia Hart and he agreed to speak with wife, phone taken to bedside. Bedside RN  Raymundo Collazo at bedside as well.

## 2021-05-19 NOTE — PROGRESS NOTES
Problem: Falls - Risk of  Goal: *Absence of Falls  Description: Document Devere Abrams Fall Risk and appropriate interventions in the flowsheet.   Outcome: Progressing Towards Goal  Note: Fall Risk Interventions:            Medication Interventions: Assess postural VS orthostatic hypotension, Bed/chair exit alarm                   Problem: Patient Education: Go to Patient Education Activity  Goal: Patient/Family Education  Outcome: Progressing Towards Goal     Problem: Patient Education: Go to Patient Education Activity  Goal: Patient/Family Education  Outcome: Progressing Towards Goal     Problem: Patient Education: Go to Patient Education Activity  Goal: Patient/Family Education  Outcome: Progressing Towards Goal     Problem: Breathing Pattern - Ineffective  Goal: *Absence of hypoxia  Outcome: Progressing Towards Goal  Goal: *Use of effective breathing techniques  Outcome: Progressing Towards Goal  Goal: *PALLIATIVE CARE:  Alleviation of Dyspnea  Outcome: Progressing Towards Goal     Problem: Breathing Pattern - Ineffective  Goal: *Absence of hypoxia  Outcome: Progressing Towards Goal  Goal: *Use of effective breathing techniques  Outcome: Progressing Towards Goal  Goal: *PALLIATIVE CARE:  Alleviation of Dyspnea  Outcome: Progressing Towards Goal     Problem: Patient Education: Go to Patient Education Activity  Goal: Patient/Family Education  Outcome: Progressing Towards Goal     Problem: Patient Education: Go to Patient Education Activity  Goal: Patient/Family Education  Outcome: Progressing Towards Goal

## 2021-05-19 NOTE — PROGRESS NOTES
Gastroenterology Progress Note  GARÍCA Michelle   for Dr. Rossy Garcia    5/19/2021    Admit Date: 5/17/2021    Subjective: Follow up for:  1)  Recurrent acute pancreatitis    2) Alcohol abuse    3) CVA    Patient is on NPO. Pain: Patient complains of abdominal pain yes. Now with nausea and vomiting. Bowel Movements: None    There is no bleeding    Lipase increased from 593 to 2220    Current Facility-Administered Medications   Medication Dose Route Frequency    ondansetron (ZOFRAN) injection 4 mg  4 mg IntraVENous Q6H PRN    lactulose (CHRONULAC) 10 gram/15 mL solution 30 mL  20 g Oral DAILY PRN    lactated Ringers infusion  200 mL/hr IntraVENous CONTINUOUS    HYDROmorphone (DILAUDID) injection 1 mg  1 mg IntraVENous Q4H PRN    polyethylene glycol (MIRALAX) packet 17 g  17 g Oral BID    colchicine tablet 0.6 mg  0.6 mg Oral DAILY    arformoterol 15 mcg/budesonide 0.25 mg neb solution   Nebulization BID    propranoloL (INDERAL) tablet 10 mg  10 mg Oral BID    acetaminophen (TYLENOL) tablet 650 mg  650 mg Oral Q4H PRN    Or    acetaminophen (TYLENOL) solution 650 mg  650 mg Per NG tube Q4H PRN    Or    acetaminophen (TYLENOL) suppository 650 mg  650 mg Rectal Q4H PRN    aspirin chewable tablet 81 mg  81 mg Oral DAILY    atorvastatin (LIPITOR) tablet 40 mg  40 mg Oral QHS    labetaloL (NORMODYNE;TRANDATE) injection 5 mg  5 mg IntraVENous Q10MIN PRN    enoxaparin (LOVENOX) injection 80 mg  1 mg/kg SubCUTAneous Q12H    pantoprazole (PROTONIX) tablet 40 mg  40 mg Oral ACB        Objective:     Blood pressure (!) 140/86, pulse 79, temperature 97.6 °F (36.4 °C), resp. rate 20, height 5' 9\" (1.753 m), weight 79.4 kg (175 lb 0.7 oz), SpO2 93 %. No intake/output data recorded.     05/17 1901 - 05/19 0700  In: 5210 [I.V.:5210]  Out: 350 [Urine:350]    EXAM:   GEN: Unkempt WM, NAD  HEENT: NCAT  Heart: RRR  Lungs; CTA  Abdomen: mild epigastric distention, generalized abdominal tenderness, worse in epigastrium and LUQ with guarding. No rebound, normal BS  Ext: No edema    Data Review    Recent Results (from the past 24 hour(s))   LIPID PANEL    Collection Time: 05/18/21  1:31 PM   Result Value Ref Range    Cholesterol, total 87 <200 MG/DL    Triglyceride 90 <150 MG/DL    HDL Cholesterol 32 MG/DL    LDL, calculated 37 0 - 100 MG/DL    VLDL, calculated 18 MG/DL    CHOL/HDL Ratio 2.7 0.0 - 5.0     CBC WITH AUTOMATED DIFF    Collection Time: 05/19/21  8:21 AM   Result Value Ref Range    WBC 21.3 (H) 4.1 - 11.1 K/uL    RBC 5.43 4.10 - 5.70 M/uL    HGB 16.9 12.1 - 17.0 g/dL    HCT 49.8 36.6 - 50.3 %    MCV 91.7 80.0 - 99.0 FL    MCH 31.1 26.0 - 34.0 PG    MCHC 33.9 30.0 - 36.5 g/dL    RDW 13.0 11.5 - 14.5 %    PLATELET 914 801 - 962 K/uL    MPV 9.8 8.9 - 12.9 FL    NRBC 0.0 0  WBC    ABSOLUTE NRBC 0.00 0.00 - 0.01 K/uL    NEUTROPHILS 89 (H) 32 - 75 %    LYMPHOCYTES 4 (L) 12 - 49 %    MONOCYTES 5 5 - 13 %    EOSINOPHILS 0 0 - 7 %    BASOPHILS 0 0 - 1 %    IMMATURE GRANULOCYTES 2 (H) 0.0 - 0.5 %    ABS. NEUTROPHILS 18.9 (H) 1.8 - 8.0 K/UL    ABS. LYMPHOCYTES 0.8 0.8 - 3.5 K/UL    ABS. MONOCYTES 1.0 0.0 - 1.0 K/UL    ABS. EOSINOPHILS 0.1 0.0 - 0.4 K/UL    ABS. BASOPHILS 0.1 0.0 - 0.1 K/UL    ABS. IMM. GRANS. 0.4 (H) 0.00 - 0.04 K/UL    DF AUTOMATED     METABOLIC PANEL, COMPREHENSIVE    Collection Time: 05/19/21  8:21 AM   Result Value Ref Range    Sodium 136 136 - 145 mmol/L    Potassium 4.4 3.5 - 5.1 mmol/L    Chloride 103 97 - 108 mmol/L    CO2 24 21 - 32 mmol/L    Anion gap 9 5 - 15 mmol/L    Glucose 77 65 - 100 mg/dL    BUN 23 (H) 6 - 20 MG/DL    Creatinine 0.79 0.70 - 1.30 MG/DL    BUN/Creatinine ratio 29 (H) 12 - 20      GFR est AA >60 >60 ml/min/1.73m2    GFR est non-AA >60 >60 ml/min/1.73m2    Calcium 8.1 (L) 8.5 - 10.1 MG/DL    Bilirubin, total 1.1 (H) 0.2 - 1.0 MG/DL    ALT (SGPT) 19 12 - 78 U/L    AST (SGOT) 21 15 - 37 U/L    Alk.  phosphatase 131 (H) 45 - 117 U/L    Protein, total 5.8 (L) 6.4 - 8.2 g/dL    Albumin 2.5 (L) 3.5 - 5.0 g/dL    Globulin 3.3 2.0 - 4.0 g/dL    A-G Ratio 0.8 (L) 1.1 - 2.2     LIPASE    Collection Time: 05/19/21  8:21 AM   Result Value Ref Range    Lipase 2,220 (H) 73 - 393 U/L     Recent Labs     05/19/21 0821 05/18/21 0259   WBC 21.3* 17.8*   HGB 16.9 16.0   HCT 49.8 48.8    180     Recent Labs     05/19/21 0821 05/17/21  0315    136   K 4.4 4.8    103   CO2 24 27   BUN 23* 25*   CREA 0.79 1.20   GLU 77 90   CA 8.1* 8.8     Recent Labs     05/19/21 0821 05/18/21 0259 05/17/21  0315   ALT 19  --  28   *  --  126*   TBILI 1.1*  --  0.6   TP 5.8*  --  6.9   ALB 2.5*  --  2.7*   GLOB 3.3  --  4.2*   LPSE 2,220* 593* 485*     No results for input(s): INR, PTP, APTT, INREXT, INREXT in the last 72 hours. No results for input(s): FE, TIBC, PSAT, FERR in the last 72 hours. No results found for: FOL, RBCF   No results for input(s): PH, PCO2, PO2 in the last 72 hours.   Recent Labs     05/17/21 0315   CPK 29*   CKNDX Cannot be calculated   TROIQ <0.05     Lab Results   Component Value Date/Time    Cholesterol, total 87 05/18/2021 01:31 PM    HDL Cholesterol 32 05/18/2021 01:31 PM    LDL, calculated 37 05/18/2021 01:31 PM    Triglyceride 90 05/18/2021 01:31 PM    CHOL/HDL Ratio 2.7 05/18/2021 01:31 PM     No results found for: [facility]  Lab Results   Component Value Date/Time    Color YELLOW/STRAW 05/17/2021 09:50 AM    Appearance CLEAR 05/17/2021 09:50 AM    Specific gravity 1.015 05/17/2021 09:50 AM    Specific gravity 1.017 08/22/2018 07:48 PM    pH (UA) 5.0 05/17/2021 09:50 AM    Protein TRACE (A) 05/17/2021 09:50 AM    Glucose Negative 05/17/2021 09:50 AM    Ketone Negative 05/17/2021 09:50 AM    Bilirubin Negative 05/17/2021 09:50 AM    Urobilinogen 1.0 05/17/2021 09:50 AM    Nitrites Negative 05/17/2021 09:50 AM    Leukocyte Esterase Negative 05/17/2021 09:50 AM    Epithelial cells FEW 05/17/2021 09:50 AM    Bacteria Negative 05/17/2021 09:50 AM WBC 5-10 05/17/2021 09:50 AM    RBC 0-5 05/17/2021 09:50 AM           Assessment:     Active Problems:    H/O atrial flutter (8/23/2018)      A-fib ([facility] [address] (5/17/2021)      Acute CVA (cerebrovascular accident) ([facility] [address] (5/17/2021)      Abdominal pain (5/17/2021)      Acute pancreatitis (5/17/2021)        Plan:   Lipase and WBC increasing. Spoke with Gene Jain who is going to order repeat CT scan to access from complications such as pseudocyst formation, necrosis, and ascites secondary to pancreatiis. Keep NPO. Pain management per primary care team.  Increase IVF to 200 ml/hour. Will follow closely. Consider changing abx to Meropenum if necrosis on CT. Updated wife on patient's progress. Discussed the severity of pancreatitis and risk of multiorgan failure as well as requirement from ICU transfer. Not at that point but not out of the [facility].      GARCÍA Kelly    05/19/21  10:54 AM  [address] [facility], [address] [place] [address]  [phone] [phone]  [address] [place] [address] [place]: [phone]

## 2021-05-19 NOTE — PROGRESS NOTES
Occupational Therapy:    Chart reviewed, cleared by RN and attempted to see Pt for therapy however upon sitting EOB Pt became increasingly nauseas stating, \"I need to throw up but haven't been able to since Sunday. \" Pt had to return supine d/t increasing nausea. Will defer and continue to follow.      [facility], OTR/L

## 2021-05-19 NOTE — PROGRESS NOTES
Hospitalist Progress Note    NAME: Luc Perez Sr.   :  1950   MRN:  [id number]       Assessment / Plan:    Acute CVA   AF  Neurology consultation  Continue full dose Lovenox  PT OT evaluation  LDL 37 as per neurology no indication for lipid therapy     Acute recurrent  Pancreatitis possibly alcoholic versus secondary to immunotherapy Keytruda  -NPO   -IV fluid   -pain med   -GI consult  Honorio Malik was discussed with [facility] attending Dr. Matt Kramer about possibility patient pancreatitis secondary to immunotherapy Maurie Essex, he recommend to start patient on prednisone if GI agrees and there is no sign of necrotizing pancreatitis, case was discussed with the GI      alcohol abuse   -Continue monitoring for alcohol withdrawal    Gout   -continue colchicine      H/o Malignant melanoma in Lung     25.0 - 29.9 Overweight / Body mass index is 25.85 kg/m².     Estimated discharge date: May 20  Barriers:     Code status: Full  Prophylaxis: Lovenox  Recommended Disposition: Home w/Family        Patient condition was discussed with the wife on the bedside all all question was answered       Subjective:     Chief Complaint / Reason for Physician Visit  . Discussed with RN events overnight. Still complaining from pain, repeat CT abdomen    Review of Systems:  Symptom Y/N Comments  Symptom Y/N Comments   Fever/Chills n   Chest Pain n    Poor Appetite    Edema     Cough    Abdominal Pain y    Sputum    Joint Pain     SOB/DANIELS n   Pruritis/Rash     Nausea/vomit    Tolerating PT/OT     Diarrhea    Tolerating Diet n    Constipation y   Other       Could NOT obtain due to:      Objective:     VITALS:   Last 24hrs VS reviewed since prior progress note.  Most recent are:  Patient Vitals for the past 24 hrs:   Temp Pulse Resp BP SpO2   21 1111 97.6 °F (36.4 °C) 79 20 (!) 140/86 93 %   21 0930     92 %   21 0806 97.3 °F (36.3 °C) 83 20 116/78 94 %   21 2000 98.8 °F (37.1 °C) 73 18 120/67 92 % 05/18/21 1600  96        No intake or output data in the 24 hours ending 05/19/21 1523     I had a face to face encounter and independently examined this patient on 5/19/2021, as outlined below:  PHYSICAL EXAM:  General: WD, WN. Alert, cooperative, no acute distress    EENT:  EOMI. Anicteric sclerae. MMM  Resp:  CTA bilaterally, no wheezing or rales. No accessory muscle use  CV:  Regular  rhythm,  No edema  GI:  ,  distended,  tender. +Bowel sounds  Neurologic:  Alert and oriented X 3, normal speech,   Psych:   Good insight. Not anxious nor agitated  Skin:  No rashes. No jaundice    Reviewed most current lab test results and cultures  YES  Reviewed most current radiology test results   YES  Review and summation of old records today    NO  Reviewed patient's current orders and MAR    YES  PMH/ reviewed - no change compared to H&P  ________________________________________________________________________  Care Plan discussed with:    Comments   Patient y    Family  y    RN y    Care Manager     Consultant                        Multidiciplinary team rounds were held today with , nursing, pharmacist and clinical coordinator. Patient's plan of care was discussed; medications were reviewed and discharge planning was addressed. ________________________________________________________________________  Total NON critical care TIME:  35    Minutes    Total CRITICAL CARE TIME Spent:   Minutes non procedure based      Comments   >50% of visit spent in counseling and coordination of care y    ________________________________________________________________________  Elvira Green MD     Procedures: see electronic medical records for all procedures/Xrays and details which were not copied into this note but were reviewed prior to creation of Plan. LABS:  I reviewed today's most current labs and imaging studies.   Pertinent labs include:  Recent Labs     05/19/21  0821 05/18/21  0259 05/17/21  0319 WBC 21.3* 17.8* 18.6*   HGB 16.9 16.0 17.6*   HCT 49.8 48.8 52.5*    180 228     Recent Labs     05/19/21  0821 05/17/21  0315    136   K 4.4 4.8    103   CO2 24 27   GLU 77 90   BUN 23* 25*   CREA 0.79 1.20   CA 8.1* 8.8   ALB 2.5* 2.7*   TBILI 1.1* 0.6   ALT 19 28       Signed:  Eliazar Plasencia MD

## 2021-05-19 NOTE — PROGRESS NOTES
Patient had old IV removed due to infiltration. Placed a new IV Size 20 in the upper left arm. One attempt, patient tolerated well. IV fluids continued as ordered.

## 2021-05-19 NOTE — PROGRESS NOTES
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 Lysbeth Carrel

## 2021-05-19 NOTE — PROGRESS NOTES
ADULT PROTOCOL: JET AEROSOL ASSESSMENT    Patient  Alondra Linn Sr.     70 y.o.   male     5/18/2021  9:14 PM    Breath Sounds Pre Procedure: Right Breath Sounds: Diminished                               Left Breath Sounds: Diminished    Breath Sounds Post Procedure: Right Breath Sounds: Diminished                                 Left Breath Sounds: Diminished    Breathing pattern: Pre procedure Breathing Pattern: Regular          Post procedure Breathing Pattern: Regular    Heart Rate: Pre procedure Pulse: 55           Post procedure Pulse: 65    Resp Rate: Pre procedure Respirations: 18           Post procedure Respirations: 18      Cough: Pre procedure Cough: Non-productive               Post procedure        Oxygen: O2 Device: None (Room air)        Changed: NO    SpO2: Pre procedure SpO2: 91 %   without oxygen              Post procedure SpO2: 93 %  without oxygen    Nebulizer Therapy: Current medications Aerosolized Medications: Pulmicort, Brovana      Changed: NO    Smoking History:    Smoking status: Current Every Day Smoker       Packs/day: 1.50    Smokeless tobacco: Former User         Problem List:   Patient Active Problem List   Diagnosis Code    Severe sepsis ([facility] [address] A41.9, R65.20    ARF (acute renal failure) ([facility]) N17.9    Gout M10.9    Hypotension I95.9    H/O atrial flutter Z86.79    Dehydration E86.0    A-fib ([facility]) I48.91    Acute CVA (cerebrovascular accident) ([facility] [address] I63.9    Abdominal pain R10.9    Acute pancreatitis K85.90       Respiratory Therapist: Olimpia Pacheco, RT

## 2021-05-19 NOTE — PROGRESS NOTES
Physical Therapy  Attempted PT treatment. Patient initially agreeable to attempt. Upon sitting EOB has increased nausea and pain. Declines to participate further secondary to above. Will defer and check back tmrw for mobility progression as tolerated.   Rosalba Mason, PT, DPT

## 2021-05-19 NOTE — PROGRESS NOTES
Neurology Note    Patient ID:  Mindy   [id number]  70 y.o.  1950      Date of Consultation:  May 19, 2021      Subjective: my vision is still not normal       History of Present Illness:   Gayatri Parr Sr. is a 70 y.o. male who presented to an outside emergency room with symptoms of abdominal discomfort, dizziness, and lightheadedness. There is also been blurry vision. The patient reported that the symptoms had been going on for approximately 2+ days prior to presenting to outside emergency room. At the outside emergency room, he was noted to have a possible occipital stroke in evolution on head CT. He was found to have  atrial fibrillation seen on EKG. He was transferred to [facility].  Since arrival, he does feel that his vision has improved a slight bit. He is still suffering with a significant amount of abdominal pain. Unchanged from yesterday  GI  is now following the patient due to his pancreatitis. There is no new numbness, tingling, or weakness. Past Medical History:   Diagnosis Date    Atrial flutter ([address]     Hypertension     Ill-defined condition     gout      Melanoma. Past Surgical History:   Procedure Laterality Date    HX APPENDECTOMY      HX OTHER SURGICAL      Cardiac Ablation for A. Flutter 07/2018        Family History   Problem Relation Age of Onset    Diabetes Mother     Heart Attack Father         Social History     Tobacco Use    Smoking status: Current Every Day Smoker     Packs/day: 1.50    Smokeless tobacco: Former User   Substance Use Topics    Alcohol use:  Yes     Alcohol/week: 10.0 standard drinks     Types: 10 Shots of liquor per week        No Known Allergies       Current Facility-Administered Medications   Medication Dose Route Frequency    ondansetron (ZOFRAN) injection 4 mg  4 mg IntraVENous Q6H PRN    lactulose (CHRONULAC) 10 gram/15 mL solution 30 mL  20 g Oral DAILY PRN    iopamidoL (ISOVUE-370) 76 % injection 100 mL  100 mL IntraVENous RAD ONCE    lactated Ringers infusion  200 mL/hr IntraVENous CONTINUOUS    HYDROmorphone (DILAUDID) injection 1 mg  1 mg IntraVENous Q4H PRN    polyethylene glycol (MIRALAX) packet 17 g  17 g Oral BID    colchicine tablet 0.6 mg  0.6 mg Oral DAILY    arformoterol 15 mcg/budesonide 0.25 mg neb solution   Nebulization BID    propranoloL (INDERAL) tablet 10 mg  10 mg Oral BID    acetaminophen (TYLENOL) tablet 650 mg  650 mg Oral Q4H PRN    Or    acetaminophen (TYLENOL) solution 650 mg  650 mg Per NG tube Q4H PRN    Or    acetaminophen (TYLENOL) suppository 650 mg  650 mg Rectal Q4H PRN    aspirin chewable tablet 81 mg  81 mg Oral DAILY    atorvastatin (LIPITOR) tablet 40 mg  40 mg Oral QHS    labetaloL (NORMODYNE;TRANDATE) injection 5 mg  5 mg IntraVENous Q10MIN PRN    enoxaparin (LOVENOX) injection 80 mg  1 mg/kg SubCUTAneous Q12H    pantoprazole (PROTONIX) tablet 40 mg  40 mg Oral ACB       Review of Systems:    General, constitutional: negative  Eyes, vision: negative  Ears, nose, throat: negative  Cardiovascular, heart: negative  Respiratory: negative  Gastrointestinal: abd. pain  Genitourinary: negative  Musculoskeletal: negative  Skin and integumentary: negative  Psychiatric: negative  Endocrine: negative  Neurological: negative, except for HPI  Hematologic/lymphatic: negative  Allergy/immunology: negative    Objective:     Visit Vitals  BP (!) 140/86 (BP 1 Location: Right upper arm, BP Patient Position: Lying)   Pulse 79   Temp 97.6 °F (36.4 °C)   Resp 20   Ht 5' 9\" (1.753 m)   Wt 175 lb 0.7 oz (79.4 kg)   SpO2 93%   BMI 25.85 kg/m²       Physical Exam:    General:  appears well nourished in significant discomfort  Neck: no carotid bruits  Lungs: clear to auscultation  Heart:  no murmurs, regular rate  Lower extremity: peripheral pulses palpable and no edema  Skin: intact    Neurological exam:    Awake, alert, oriented to person, place and time  Recent and remote memory were normal  Attention and concentration were intact  Language was intact. There was no aphasia  Speech: no dysarthria  Fund of knowledge was preserved    Cranial nerves:   II-XII were tested    Perrrla  Incomplete homonymous field cut on the left. Still persisting. Eomi, no evidence of nystagmus  Facial sensation:  normal and symmetric  Facial motor: normal and symmetric  Hearing intact  SCM strength intact  Tongue: midline without fasciculations    Motor: Tone normal  Mild left pronator drift    No evidence of fasciculations    Strength testing:   deltoid triceps biceps Wrist ext. Wrist flex. intrinsics Hip flex. Hip ext. Knee ext. Knee flex Dorsi flex Plantar flex   Right 5 5 5 5 5 5 5 5 5 5 5 5   Left 4 5 5 5 5 5 5 5 5 5 5 5       Sensory:  Upper extremity: intact to pp  Lower extremity: intact to pp    Reflexes:    Right Left  Biceps  2 2  Triceps 2 2  Brachiorad. 2 2  Patella  2 2  Achilles 2 2    Plantar response:  Extensor on the left    Cerebellar testing:  no tremor apparent, finger/nose and jacquie were dysmetric on the left. Labs:     Lab Results   Component Value Date/Time    Hemoglobin A1c 5.1 05/17/2021 03:15 AM    Sodium 136 05/19/2021 08:21 AM    Potassium 4.4 05/19/2021 08:21 AM    Chloride 103 05/19/2021 08:21 AM    Glucose 77 05/19/2021 08:21 AM    BUN 23 (H) 05/19/2021 08:21 AM    Creatinine 0.79 05/19/2021 08:21 AM    Calcium 8.1 (L) 05/19/2021 08:21 AM    WBC 21.3 (H) 05/19/2021 08:21 AM    HCT 49.8 05/19/2021 08:21 AM    HGB 16.9 05/19/2021 08:21 AM    PLATELET 440 99/88/6176 08:21 AM       Imaging:    No results found for this or any previous visit. Results from [place] encounter on 05/17/21    CT HEAD W WO CONT    Narrative  EXAM: CT HEAD W WO CONT    INDICATION: R/o CVA , metastasis? COMPARISON: None. CONTRAST: 100 mL of Isovue-370.     TECHNIQUE:CT of the head was performed prior to and following uneventful rapid  bolus intravenous administration of contrast.  Brain and bone windows were  generated. Coronal and sagittal reformats. CT dose reduction was achieved  through use of a standardized protocol tailored for this examination and  automatic exposure control for dose modulation. FINDINGS:  There is a acute to subacute right posterior parietal ischemic event. There is  some mild mass effect. No Abnormal enhancement, no discrete mass. No shift of the midline extra-axial fluid collection or hemorrhage. Mild atrophy and white matter changes. Impression  Right posterior parietal infarct. head CT from 5/17/2021. There was evidence of a right posterior parietal lobe stroke. There was no hemorrhagic component. Assessment and Plan:    The patient is a pleasant 59-year-old gentleman with acute onset of dizziness and visual difficulties. His CT scan does show an acute to subacute stroke in the right posterior parietal lobe. He was found to have atrial fibrillation on telemetry. Acute stroke:    His risk factors for stroke include atrial fibrillation, smoking, htn    The patient cannot receive an MRI due to prior metal shrapnel. Hemoglobin A1c at goal.    LDL 37. No indication for lipid therapy. Echocardiogram completed    I would recommend anticoagulation given the embolic nature to the stroke given his history of atrial fibrillation. He has been started on Lovenox with plan to switch to Eliquis. Carotid doppler pending:  Is significant stenosis, please consult vascular surgery    Htn: aggressive control with goal sbp < 140    I did discuss again the importance of smoking cessation. physical therapy, Occupational Therapy, and speech therapy. Concern for abdominal inflammation/infection:      Pancreatitis versus enteritis  This is causing sig. Discomfort for the patient. This is being followed by the primary team and GI. There is no other additional neurology recommendations at this time.   If questions arise, please not hesitate to contact me and I will return to see the patient. The patient should follow-up in clinic in 3 to 4 weeks time after discharge. Active Problems:    H/O atrial flutter (8/23/2018)      A-fib ([facility] [address] (5/17/2021)      Acute CVA (cerebrovascular accident) ([facility] [address] (5/17/2021)      Abdominal pain (5/17/2021)      Acute pancreatitis (5/17/2021)        I spent  30   minutes providing care to this  acutely ill inpatient with > 50% of the time counseling and assisting in the coordination of care of the patient on the patient's hospital floor/unit. I call wife and there was no answer. I left a message for her to call if she had any additional questions or concerns. She was able to speak with other physicians earlier today.                  Signed By:  Cynthia Parkinson DO FAAN    May 19, 2021

## 2021-05-19 NOTE — PROGRESS NOTES
Speech Pathology  Offered tx but patient politely refused, reporting lack of sleep last night. SLP will continue to follow.

## 2021-05-19 NOTE — PROGRESS NOTES
Pharmacy Automatic Renal Dosing Protocol - Antimicrobials    Indication for Antimicrobials: intra abdominal / pancreatitis    Current Regimen of Each Antimicrobial:  Meropenem 500 mg iv every 6 hr (Start Date ; Day # 1)    Previous Antimicrobial Therapy:   (Start Date ; Day    Goal Level:   (AUC: 400 - 600 mg/hr/Liter/day)     Date Dose & Interval Measured (mcg/mL) Extrapolated (mcg/mL)                       Date & time of next level:     Significant Cultures:       Radiology / Imaging results: (X-ray, CT scan or MRI):     Paralysis, amputations, malnutrition:     Labs:  Recent Labs     21  0821 21  0259 21  0315   CREA 0.79  --  1.20   BUN 23*  --  25*   WBC 21.3* 17.8* 18.6*     Temp (24hrs), Av.9 °F (36.6 °C), Min:97.3 °F (36.3 °C), Max:98.8 °F (37.1 °C)      Is the Patient on Dialysis? Creatinine Clearance (mL/min):   CrCl (Actual Body Weight): 96.3  CrCl (Adjusted Body Weight): 90.0  CrCl (Ideal Body Weight): 85.8    Impression/Plan:   Meropenem adjusted to 500 mg iv every 6 hr per dosing protocol  Antimicrobial stop date pending  Bmp for      Pharmacy will follow daily and adjust medications as appropriate for renal function and/or serum levels. Thank you,  Miky Jimenez, PHARMD    Recommended duration of therapy  [email or web address]/Pharmacy/Clinical%[email or web address] [email or web address]    Renal Dosing  [email or web address]/[email or web address]/[email or web address] pdf

## 2021-05-20 LAB
ANION GAP SERPL CALC-SCNC: 7 MMOL/L (ref 5–15)
BASOPHILS # BLD: 0 K/UL (ref 0–0.1)
BASOPHILS NFR BLD: 0 % (ref 0–1)
BUN SERPL-MCNC: 22 MG/DL (ref 6–20)
BUN/CREAT SERPL: 25 (ref 12–20)
CALCIUM SERPL-MCNC: 8.5 MG/DL (ref 8.5–10.1)
CHLORIDE SERPL-SCNC: 103 MMOL/L (ref 97–108)
CO2 SERPL-SCNC: 24 MMOL/L (ref 21–32)
COVID-19 RAPID TEST, COVR: ABNORMAL
CREAT SERPL-MCNC: 0.88 MG/DL (ref 0.7–1.3)
DIFFERENTIAL METHOD BLD: ABNORMAL
EOSINOPHIL # BLD: 0 K/UL (ref 0–0.4)
EOSINOPHIL NFR BLD: 0 % (ref 0–7)
ERYTHROCYTE [DISTWIDTH] IN BLOOD BY AUTOMATED COUNT: 13 % (ref 11.5–14.5)
GLUCOSE SERPL-MCNC: 129 MG/DL (ref 65–100)
HCT VFR BLD AUTO: 49.2 % (ref 36.6–50.3)
HGB BLD-MCNC: 16.7 G/DL (ref 12.1–17)
IMM GRANULOCYTES # BLD AUTO: 0 K/UL (ref 0–0.04)
IMM GRANULOCYTES NFR BLD AUTO: 0 % (ref 0–0.5)
LEFT CCA DIST DIAS: 16 CM/S
LEFT CCA DIST SYS: 74.1 CM/S
LEFT CCA PROX DIAS: 13.4 CM/S
LEFT CCA PROX SYS: 81 CM/S
LEFT ECA DIAS: 3.94 CM/S
LEFT ECA SYS: 54.5 CM/S
LEFT ICA DIST DIAS: 21.4 CM/S
LEFT ICA DIST SYS: 51.2 CM/S
LEFT ICA MID DIAS: 13.9 CM/S
LEFT ICA MID SYS: 36.9 CM/S
LEFT ICA PROX DIAS: 11.3 CM/S
LEFT ICA PROX SYS: 40.7 CM/S
LEFT ICA/CCA SYS: 0.69
LEFT SUBCLAVIAN DIAS: 0 CM/S
LEFT SUBCLAVIAN SYS: 82.7 CM/S
LEFT VERTEBRAL DIAS: 9.89 CM/S
LEFT VERTEBRAL SYS: 27 CM/S
LIPASE SERPL-CCNC: 733 U/L (ref 73–393)
LYMPHOCYTES # BLD: 1.2 K/UL (ref 0.8–3.5)
LYMPHOCYTES NFR BLD: 5 % (ref 12–49)
MCH RBC QN AUTO: 30.8 PG (ref 26–34)
MCHC RBC AUTO-ENTMCNC: 33.9 G/DL (ref 30–36.5)
MCV RBC AUTO: 90.6 FL (ref 80–99)
MONOCYTES # BLD: 0.7 K/UL (ref 0–1)
MONOCYTES NFR BLD: 3 % (ref 5–13)
NEUTS SEG # BLD: 21.3 K/UL (ref 1.8–8)
NEUTS SEG NFR BLD: 92 % (ref 32–75)
NRBC # BLD: 0 K/UL (ref 0–0.01)
NRBC BLD-RTO: 0 PER 100 WBC
PLATELET # BLD AUTO: 191 K/UL (ref 150–400)
PMV BLD AUTO: 10 FL (ref 8.9–12.9)
POTASSIUM SERPL-SCNC: 4.7 MMOL/L (ref 3.5–5.1)
RBC # BLD AUTO: 5.43 M/UL (ref 4.1–5.7)
RBC MORPH BLD: ABNORMAL
RIGHT CCA DIST DIAS: 19.1 CM/S
RIGHT CCA DIST SYS: 54.7 CM/S
RIGHT CCA PROX DIAS: 15.6 CM/S
RIGHT CCA PROX SYS: 68.1 CM/S
RIGHT ECA DIAS: 6.95 CM/S
RIGHT ECA SYS: 56.3 CM/S
RIGHT ICA DIST DIAS: 15.6 CM/S
RIGHT ICA DIST SYS: 34.6 CM/S
RIGHT ICA MID DIAS: 12.3 CM/S
RIGHT ICA MID SYS: 33.1 CM/S
RIGHT ICA PROX DIAS: 11.7 CM/S
RIGHT ICA PROX SYS: 31.1 CM/S
RIGHT ICA/CCA SYS: 0.6
RIGHT SUBCLAVIAN DIAS: 0 CM/S
RIGHT SUBCLAVIAN SYS: 73.8 CM/S
RIGHT VERTEBRAL DIAS: 16.49 CM/S
RIGHT VERTEBRAL SYS: 44.4 CM/S
SODIUM SERPL-SCNC: 134 MMOL/L (ref 136–145)
SOURCE, COVRS: ABNORMAL
WBC # BLD AUTO: 23.2 K/UL (ref 4.1–11.1)

## 2021-05-20 PROCEDURE — 36415 COLL VENOUS BLD VENIPUNCTURE: CPT

## 2021-05-20 PROCEDURE — 74011000258 HC RX REV CODE- 258: Performed by: PHYSICIAN ASSISTANT

## 2021-05-20 PROCEDURE — 80048 BASIC METABOLIC PNL TOTAL CA: CPT

## 2021-05-20 PROCEDURE — 97530 THERAPEUTIC ACTIVITIES: CPT

## 2021-05-20 PROCEDURE — 74011250636 HC RX REV CODE- 250/636: Performed by: SPECIALIST

## 2021-05-20 PROCEDURE — 92507 TX SP LANG VOICE COMM INDIV: CPT | Performed by: SPEECH-LANGUAGE PATHOLOGIST

## 2021-05-20 PROCEDURE — 97535 SELF CARE MNGMENT TRAINING: CPT

## 2021-05-20 PROCEDURE — 94640 AIRWAY INHALATION TREATMENT: CPT

## 2021-05-20 PROCEDURE — 82787 IGG 1 2 3 OR 4 EACH: CPT

## 2021-05-20 PROCEDURE — 85025 COMPLETE CBC W/AUTO DIFF WBC: CPT

## 2021-05-20 PROCEDURE — 74011250636 HC RX REV CODE- 250/636: Performed by: INTERNAL MEDICINE

## 2021-05-20 PROCEDURE — 65660000000 HC RM CCU STEPDOWN

## 2021-05-20 PROCEDURE — 74011250637 HC RX REV CODE- 250/637: Performed by: SPECIALIST

## 2021-05-20 PROCEDURE — 74011250636 HC RX REV CODE- 250/636: Performed by: STUDENT IN AN ORGANIZED HEALTH CARE EDUCATION/TRAINING PROGRAM

## 2021-05-20 PROCEDURE — 97116 GAIT TRAINING THERAPY: CPT

## 2021-05-20 PROCEDURE — 2709999900 HC NON-CHARGEABLE SUPPLY

## 2021-05-20 PROCEDURE — 74011000250 HC RX REV CODE- 250: Performed by: INTERNAL MEDICINE

## 2021-05-20 PROCEDURE — 86301 IMMUNOASSAY TUMOR CA 19-9: CPT

## 2021-05-20 PROCEDURE — 74011250636 HC RX REV CODE- 250/636: Performed by: PHYSICIAN ASSISTANT

## 2021-05-20 PROCEDURE — 83690 ASSAY OF LIPASE: CPT

## 2021-05-20 PROCEDURE — 74011250637 HC RX REV CODE- 250/637: Performed by: INTERNAL MEDICINE

## 2021-05-20 PROCEDURE — 93880 EXTRACRANIAL BILAT STUDY: CPT | Performed by: PSYCHIATRY & NEUROLOGY

## 2021-05-20 PROCEDURE — 87635 SARS-COV-2 COVID-19 AMP PRB: CPT

## 2021-05-20 RX ADMIN — FAMOTIDINE 20 MG: 10 INJECTION, SOLUTION INTRAVENOUS at 10:05

## 2021-05-20 RX ADMIN — METHYLPREDNISOLONE SODIUM SUCCINATE 120 MG: 40 INJECTION, POWDER, FOR SOLUTION INTRAMUSCULAR; INTRAVENOUS at 10:05

## 2021-05-20 RX ADMIN — ARFORMOTEROL TARTRATE: 15 SOLUTION RESPIRATORY (INHALATION) at 20:00

## 2021-05-20 RX ADMIN — ARFORMOTEROL TARTRATE: 15 SOLUTION RESPIRATORY (INHALATION) at 07:35

## 2021-05-20 RX ADMIN — ENOXAPARIN SODIUM 80 MG: 80 INJECTION SUBCUTANEOUS at 04:30

## 2021-05-20 RX ADMIN — FAMOTIDINE 20 MG: 10 INJECTION, SOLUTION INTRAVENOUS at 21:35

## 2021-05-20 RX ADMIN — POLYETHYLENE GLYCOL 3350 17 G: 17 POWDER, FOR SOLUTION ORAL at 17:18

## 2021-05-20 RX ADMIN — PROPRANOLOL HYDROCHLORIDE 10 MG: 10 TABLET ORAL at 10:05

## 2021-05-20 RX ADMIN — PROPRANOLOL HYDROCHLORIDE 10 MG: 10 TABLET ORAL at 17:18

## 2021-05-20 RX ADMIN — MEROPENEM 500 MG: 500 INJECTION, POWDER, FOR SOLUTION INTRAVENOUS at 13:43

## 2021-05-20 RX ADMIN — COLCHICINE 0.6 MG: 0.6 TABLET, FILM COATED ORAL at 10:12

## 2021-05-20 RX ADMIN — MEROPENEM 500 MG: 500 INJECTION, POWDER, FOR SOLUTION INTRAVENOUS at 05:42

## 2021-05-20 RX ADMIN — SODIUM CHLORIDE, POTASSIUM CHLORIDE, SODIUM LACTATE AND CALCIUM CHLORIDE 200 ML/HR: 600; 310; 30; 20 INJECTION, SOLUTION INTRAVENOUS at 05:49

## 2021-05-20 RX ADMIN — ENOXAPARIN SODIUM 80 MG: 80 INJECTION SUBCUTANEOUS at 17:18

## 2021-05-20 RX ADMIN — POLYETHYLENE GLYCOL 3350 17 G: 17 POWDER, FOR SOLUTION ORAL at 10:05

## 2021-05-20 NOTE — PROGRESS NOTES
Gastroenterology Progress Note  GARCÍA Eaton   for Dr. 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 Reports some improving abdominal pain. Mostly in lower abdomen but upper abdomen is feeling much better. No N/V, no F/C. He is requesting to be transferred to [facility] or go home. Reports oncologist is agreeable to accept him as transfer. Bowel Movements: normal     There is no bleeding    Lipase came down to 733 today    CT repeated yesterday showing: IMPRESSION     1. Enlarged focal soft tissue fullness which is slightly hypodense and the  pancreatic head likely reflective of focal pancreatitis. However, underlying  neoplasm cannot exclude and further evaluation with dedicated pancreatic MRI  preferred over multiphase CT is recommended once patient is over acute illness.      2. There is inflammatory change extending inferior to the pancreas with a 1.9 x  3.9 cm collection anterior to the horizontal duodenum which may reflect  pseudocyst progression of extensive inflammatory stranding in the small bowel  mesentery suspicious for pancreatitis related fat necrosis.     3. Nonobstructing nephrolithiasis, splenic infarcts with mild splenomegaly, and  additional incidentals as above.     US done on 5/19 showing no gallstones, mild splenomegaly, echogenic liver    Current Facility-Administered Medications   Medication Dose Route Frequency    ondansetron (ZOFRAN) injection 4 mg  4 mg IntraVENous Q6H PRN    lactulose (CHRONULAC) 10 gram/15 mL solution 30 mL  20 g Oral DAILY PRN    famotidine (PF) (PEPCID) injection 20 mg  20 mg IntraVENous Q12H    methylPREDNISolone (PF) (SOLU-MEDROL) injection 120 mg  120 mg IntraVENous DAILY    meropenem (MERREM) 500 mg in 0.9% sodium chloride (MBP/ADV) 50 mL MBP  500 mg IntraVENous Q6H    lactated Ringers infusion  200 mL/hr IntraVENous CONTINUOUS    HYDROmorphone (DILAUDID) injection 1 mg  1 mg IntraVENous Q4H PRN    polyethylene glycol (MIRALAX) packet 17 g  17 g Oral BID    colchicine tablet 0.6 mg  0.6 mg Oral DAILY    arformoterol 15 mcg/budesonide 0.25 mg neb solution   Nebulization BID    propranoloL (INDERAL) tablet 10 mg  10 mg Oral BID    acetaminophen (TYLENOL) tablet 650 mg  650 mg Oral Q4H PRN    Or    acetaminophen (TYLENOL) solution 650 mg  650 mg Per NG tube Q4H PRN    Or    acetaminophen (TYLENOL) suppository 650 mg  650 mg Rectal Q4H PRN    labetaloL (NORMODYNE;TRANDATE) injection 5 mg  5 mg IntraVENous Q10MIN PRN    enoxaparin (LOVENOX) injection 80 mg  1 mg/kg SubCUTAneous Q12H        Objective:     Blood pressure (!) 130/94, pulse 88, temperature 97.7 °F (36.5 °C), resp. rate 18, height 5' 9\" (1.753 m), weight 76.1 kg (167 lb 12.3 oz), SpO2 95 %. No intake/output data recorded. 05/18 1901 - 05/20 0700  In: 2063.3 [I.V.:2063.3]  Out: 400 [Urine:400]    EXAM:   GEN: Unkempt WM, NAD  HEENT: NCAT  Heart: RRR  Lungs; CTA  Abdomen: mild epigastric distention, epigastrium is non tender, mild LLQ tenderness without guarding or rebound. Normal BS.   Ext: No edema    Data Review    Recent Results (from the past 24 hour(s))   PROCALCITONIN    Collection Time: 05/19/21 12:34 PM   Result Value Ref Range    Procalcitonin 1.21 ng/mL   DUPLEX CAROTID BILATERAL    Collection Time: 05/19/21  4:25 PM   Result Value Ref Range    Right subclavian sys 73.8 cm/s    RIGHT SUBCLAVIAN ARTERY D 0.00 cm/s    Right cca dist sys 54.7 cm/s    Right CCA dist khalil 19.1 cm/s    Right CCA prox sys 68.1 cm/s    Right CCA prox khalil 15.6 cm/s    Right ICA dist sys 34.6 cm/s    Right ICA dist khalil 15.6 cm/s    Right ICA mid sys 33.1 cm/s    Right ICA mid khalil 12.3 cm/s    Right ICA prox sys 31.1 cm/s    Right ICA prox khalil 11.7 cm/s    Right eca sys 56.3 cm/s    RIGHT EXTERNAL CAROTID ARTERY D 6.95 cm/s    Right vertebral sys 44.4 cm/s    RIGHT VERTEBRAL ARTERY D 16.49 cm/s    Right ICA/CCA sys 0.6     Left subclavian sys 82.7 cm/s    LEFT SUBCLAVIAN ARTERY D 0.00 cm/s    Left CCA dist sys 74.1 cm/s    Left CCA dist khalil 16.0 cm/s    Left CCA prox sys 81.0 cm/s    Left CCA prox khalil 13.4 cm/s    Left ICA dist sys 51.2 cm/s    Left ICA dist khalil 21.4 cm/s    Left ICA mid sys 36.9 cm/s    Left ICA mid khalil 13.9 cm/s    Left ICA prox sys 40.7 cm/s    Left ICA prox khalil 11.3 cm/s    Left ECA sys 54.5 cm/s    LEFT EXTERNAL CAROTID ARTERY D 3.94 cm/s    Left vertebral sys 27.0 cm/s    LEFT VERTEBRAL ARTERY D 9.89 cm/s    Left ICA/CCA sys 0.69    CBC WITH AUTOMATED DIFF    Collection Time: 05/20/21  1:24 AM   Result Value Ref Range    WBC 23.2 (H) 4.1 - 11.1 K/uL    RBC 5.43 4.10 - 5.70 M/uL    HGB 16.7 12.1 - 17.0 g/dL    HCT 49.2 36.6 - 50.3 %    MCV 90.6 80.0 - 99.0 FL    MCH 30.8 26.0 - 34.0 PG    MCHC 33.9 30.0 - 36.5 g/dL    RDW 13.0 11.5 - 14.5 %    PLATELET 339 171 - 868 K/uL    MPV 10.0 8.9 - 12.9 FL    NRBC 0.0 0  WBC    ABSOLUTE NRBC 0.00 0.00 - 0.01 K/uL    NEUTROPHILS 92 (H) 32 - 75 %    LYMPHOCYTES 5 (L) 12 - 49 %    MONOCYTES 3 (L) 5 - 13 %    EOSINOPHILS 0 0 - 7 %    BASOPHILS 0 0 - 1 %    IMMATURE GRANULOCYTES 0 0.0 - 0.5 %    ABS. NEUTROPHILS 21.3 (H) 1.8 - 8.0 K/UL    ABS. LYMPHOCYTES 1.2 0.8 - 3.5 K/UL    ABS. MONOCYTES 0.7 0.0 - 1.0 K/UL    ABS. EOSINOPHILS 0.0 0.0 - 0.4 K/UL    ABS. BASOPHILS 0.0 0.0 - 0.1 K/UL    ABS. IMM.  GRANS. 0.0 0.00 - 0.04 K/UL    DF MANUAL      RBC COMMENTS NORMOCYTIC, NORMOCHROMIC     LIPASE    Collection Time: 05/20/21  1:24 AM   Result Value Ref Range    Lipase 733 (H) 73 - 688 U/L   METABOLIC PANEL, BASIC    Collection Time: 05/20/21  1:24 AM   Result Value Ref Range    Sodium 134 (L) 136 - 145 mmol/L    Potassium 4.7 3.5 - 5.1 mmol/L    Chloride 103 97 - 108 mmol/L    CO2 24 21 - 32 mmol/L    Anion gap 7 5 - 15 mmol/L    Glucose 129 (H) 65 - 100 mg/dL BUN 22 (H) 6 - 20 MG/DL    Creatinine 0.88 0.70 - 1.30 MG/DL    BUN/Creatinine ratio 25 (H) 12 - 20      GFR est AA >60 >60 ml/min/1.73m2    GFR est non-AA >60 >60 ml/min/1.73m2    Calcium 8.5 8.5 - 10.1 MG/DL     Recent Labs     05/20/21  0124 05/19/21  0821   WBC 23.2* 21.3*   HGB 16.7 16.9   HCT 49.2 49.8    186     Recent Labs     05/20/21  0124 05/19/21  0821   * 136   K 4.7 4.4    103   CO2 24 24   BUN 22* 23*   CREA 0.88 0.79   * 77   CA 8.5 8.1*     Recent Labs     05/20/21 0124 05/19/21  0821 05/18/21  0259   ALT  --  19  --    AP  --  131*  --    TBILI  --  1.1*  --    TP  --  5.8*  --    ALB  --  2.5*  --    GLOB  --  3.3  --    LPSE 733* 2,220* 593*     No results for input(s): INR, PTP, APTT, INREXT, INREXT in the last 72 hours. No results for input(s): FE, TIBC, PSAT, FERR in the last 72 hours. No results found for: FOL, RBCF   No results for input(s): PH, PCO2, PO2 in the last 72 hours. No results for input(s): CPK, CKNDX, TROIQ in the last 72 hours.     No lab exists for component: CPKMB  Lab Results   Component Value Date/Time    Cholesterol, total 87 05/18/2021 01:31 PM    HDL Cholesterol 32 05/18/2021 01:31 PM    LDL, calculated 37 05/18/2021 01:31 PM    Triglyceride 90 05/18/2021 01:31 PM    CHOL/HDL Ratio 2.7 05/18/2021 01:31 PM     No results found for: [facility]  Lab Results   Component Value Date/Time    Color YELLOW/STRAW 05/17/2021 09:50 AM    Appearance CLEAR 05/17/2021 09:50 AM    Specific gravity 1.015 05/17/2021 09:50 AM    Specific gravity 1.017 08/22/2018 07:48 PM    pH (UA) 5.0 05/17/2021 09:50 AM    Protein TRACE (A) 05/17/2021 09:50 AM    Glucose Negative 05/17/2021 09:50 AM    Ketone Negative 05/17/2021 09:50 AM    Bilirubin Negative 05/17/2021 09:50 AM    Urobilinogen 1.0 05/17/2021 09:50 AM    Nitrites Negative 05/17/2021 09:50 AM    Leukocyte Esterase Negative 05/17/2021 09:50 AM    Epithelial cells FEW 05/17/2021 09:50 AM    Bacteria Negative 05/17/2021 09:50 AM    WBC 5-10 05/17/2021 09:50 AM    RBC 0-5 05/17/2021 09:50 AM     CT Results (most recent):  Results from [place] encounter on 05/17/21    CT ABD PELV W CONT    Narrative  EXAM: CT ABD PELV W CONT    INDICATION: acute pancreatitis    COMPARISON: CT 3/17/2021    CONTRAST: 100 mL of Isovue-370. TECHNIQUE:  Following the uneventful intravenous administration of contrast, thin axial  images were obtained through the abdomen and pelvis. Coronal and sagittal  reconstructions were generated. Oral contrast 50 mL Omnipaque 240 administered. CT dose reduction was achieved through use of a standardized protocol tailored  for this examination and automatic exposure control for dose modulation. FINDINGS:  LOWER THORAX: No significant abnormality in the incidentally imaged lower chest.  LIVER: No significant change in right lobe cyst. Otherwise unremarkable with  normal enhancement of hepatic vascular structures. BILIARY TREE: Gallbladder is within normal limits. CBD is not dilated. SPLEEN: Enlarged with multiple peripheral wedge-shaped hypodensities. PANCREAS: There are multiple calcifications and no duct dilation. Minimal  peripancreatic edema. There is a focal rounded hypodense fullness in the  pancreatic head measuring 2.9 x 2.8 cm which appears enlarged from prior. There  is fat stranding which extends inferior from the uncinate process around the  posterior margin of the superior mesenteric vein in conjunction with a ovoid 1.9  x 3.9 cm collection anterior to the horizontal duodenum with extensive  progression of mesenteric fat stranding in the mesentery associated with the  distal superior mesenteric artery branches. ADRENALS: Unremarkable. KIDNEYS: Bilateral renal cysts and vascular calcifications redemonstrated with  no hydronephrosis or enhancing mass.  2 mm collecting system stones suspected at  the lower pole of the right kidney and at the upper pole of the left kidney. STOMACH: Small hiatal hernia. Otherwise unremarkable. SMALL BOWEL: No dilatation or wall thickening. COLON: No dilatation or wall thickening. APPENDIX: Clear absent. PERITONEUM: Small pelvic free fluid. No free air. RETROPERITONEUM: Atherosclerotic calcification without aneurysm or dissection. No enlarged lymphadenopathy. REPRODUCTIVE ORGANS: Prostate and seminal vesicles appear unremarkable. URINARY BLADDER: No mass or calculus. BONES: Degenerative spine change. Diffuse osteopenia. No acute fracture or  aggressive lesion. ABDOMINAL WALL: No mass or hernia. ADDITIONAL COMMENTS: N/A    Impression  1. Enlarged focal soft tissue fullness which is slightly hypodense and the  pancreatic head likely reflective of focal pancreatitis. However, underlying  neoplasm cannot exclude and further evaluation with dedicated pancreatic MRI  preferred over multiphase CT is recommended once patient is over acute illness. 2. There is inflammatory change extending inferior to the pancreas with a 1.9 x  3.9 cm collection anterior to the horizontal duodenum which may reflect  pseudocyst progression of extensive inflammatory stranding in the small bowel  mesentery suspicious for pancreatitis related fat necrosis. 3. Nonobstructing nephrolithiasis, splenic infarcts with mild splenomegaly, and  additional incidentals as above. Assessment:     Active Problems:    H/O atrial flutter (8/23/2018)      A-fib ([address] (5/17/2021)      Acute CVA (cerebrovascular accident) ([address] (5/17/2021)      Abdominal pain (5/17/2021)      Acute pancreatitis (5/17/2021)        Plan:   Lipase has significantly decreased but WBC still increasing. Repeat CT on 5/19 showing persistent pancreatitis however unable to exclude mass. Recommend f/u MRI on resolution of acute inflammation. Also with fluid collection: 1.9x3.9, also with inflammatory stranding concerning for pancreatitis related fat necrosis. Started on meropenum yesterday. Pain management per primary care team. Continue IVF  200 ml/hour. Dr. Gina Blount reviewed case with pt's oncologist at [facility] yesterday. 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 Started on high dose steroids as recommended. Patient is requesting to be transferred to [facility] to be on his oncologist's service, relayed to hospitalist to arrange. GARCÍA Rojas    05/20/21  10:54 AM  [address] [facility], [address] [address]  [address] [place] [address] [place]: [phone]    I have examined the patient. I have reviewed the chart and agree with the documentation recorded by the DON, including the assessment, treatment plan, and disposition. Patient seen and examined by me. I personally performed all components of the history, physical, and medical decision making and agree with the assessment and plan with minor modifications as noted. Exam:  Alert and awake. Wants to be transferred to [facility]. \" I spoke w/ my cancer specialist'  HEENT AT  GI: distended, LLQ pain, + bowel sounds   CT w/  1. Enlarged focal soft tissue fullness which is slightly hypodense and the  pancreatic head likely reflective of focal pancreatitis. However, underlying  neoplasm cannot exclude and further evaluation with dedicated pancreatic MRI  preferred over multiphase CT is recommended once patient is over acute illness.      2. There is inflammatory change extending inferior to the pancreas with a 1.9 x  3.9 cm collection anterior to the horizontal duodenum which may reflect  pseudocyst progression of extensive inflammatory stranding in the small bowel  mesentery suspicious for pancreatitis related fat necrosis.     3. Nonobstructing nephrolithiasis, splenic infarcts with mild splenomegaly, and  additional incidentals as above. Plan:  His lipase is lower on IV steroids. It is thought that he has Keytruda related pancreatitis. WBC is higher.  He is adamant is going to [facility] and asking for the 'supervising doctor'. I spoke w/ his RN in detail. Continue current treatment with IV steroids and IVF (Not happy about being 'hooked' to IVs) 'Can I go home with my brother or can he take me to [address] transfer to [facility] if there is a bed available. Case also d/w . On IV abx for above CT findings. Gina Cadena MD  [facility])

## 2021-05-20 NOTE — PROGRESS NOTES
Problem: Communication Impaired (Adult)  Goal: *Acute Goals and Plan of Care (Insert Text)  Description: 1. Patient will participate with written expression eval.  MET  2. Patient will complete reading comprehension eval.  MET  3. Patient will read at sentence level with 80% accuracy within 7 days. Outcome: Progressing Towards Goal     SPEECH LANGUAGE PATHOLOGY TREATMENT  Patient: Alvin Sky Sr. (75 y.o. male)  Date: 5/20/2021  Diagnosis: A-fib (HCC) [I48.91]  Acute CVA (cerebrovascular accident) ([facility] [I63.9] <principal problem not specified>         ASSESSMENT:  Patient presents with impaired reading comprehension at sentence level. Patient with poor awareness of deficits. Patient not receptive to compensatory strategies of marking left margin and light house strategy. PLAN:  Patient continues to benefit from skilled intervention to address the above impairments. Continue treatment per established plan of care. Discharge Recommendations: To Be Determined     SUBJECTIVE:   Patient stated I'll be fine when I get home. I have to read. .      OBJECTIVE:   Mental Status:  Neurologic State: Alert, Agitated  Orientation Level: Oriented X4  Cognition: Impulsive, Poor safety awareness  Perception: Appears intact  Perseveration: Perseverates during conversation  Safety/Judgement: Fall prevention  Treatment & Interventions:    Language Comprehension and Expression:     Reading comprehension impaired at sentence level       After treatment:   Patient left in no apparent distress sitting up in chair and Call bell within reach    COMMUNICATION/EDUCATION:   Patient was educated regarding role of SLP, POC. Patient stated he would be fine.     The patient's plan of care including recommendations, planned interventions, and recommended diet changes were discussed with:      YADIEL Forrest  Time Calculation: 17 mins

## 2021-05-20 NOTE — PROGRESS NOTES
Problem: Mobility Impaired (Adult and Pediatric)  Goal: *Acute Goals and Plan of Care (Insert Text)  Description:   FUNCTIONAL STATUS PRIOR TO ADMISSION: Patient was independent and active without use of DME. 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 HOME SUPPORT PRIOR TO ADMISSION: The patient lived with his son but did not require assist.    Physical Therapy Goals  Initiated 5/17/2021  1. Patient will move from supine to sit and sit to supine  in bed with independence within 7 day(s). 2.  Patient will transfer from bed to chair and chair to bed with independence using the least restrictive device within 7 day(s). 3.  Patient will perform sit to stand with modified independence within 7 day(s). 4.  Patient will ambulate with modified independence for 250 feet with the least restrictive device within 7 day(s). 5.  Patient will ascend/descend 5 stairs with 1 handrail(s) with modified independence within 7 day(s). Outcome: Progressing Towards Goal   PHYSICAL THERAPY TREATMENT  Patient: Brian Busch Sr. (75 y.o. male)  Date: 5/20/2021  Diagnosis: A-fib [facility]) [I48.91]  Acute CVA (cerebrovascular accident) ([facility] [I63.9] <principal problem not specified>       Precautions: Fall  Chart, physical therapy assessment, plan of care and goals were reviewed. ASSESSMENT  Patient continues with skilled PT services and is progressing towards goals. Patient received in bed and agreeable to participate with encouragement, complaining about IV and beeping of things in room and states that he is transferring to [facility]. Patient was able to perform bed mobility at indep level and transfers with SBA, can stand unsupported and perform functional reaching activities and turn without LOB. Patient ambulated in hallway x approx 150 feet with steady gait, mildly impulsive but no overt LOB. Patient appears able to discharge home with HHPT to follow if he does not transfer to [facility].      Current Level of Function Impacting Discharge (mobility/balance): CGA to ambulate    Other factors to consider for discharge: falls risk         PLAN :  Patient continues to benefit from skilled intervention to address the above impairments. Continue treatment per established plan of care. to address goals. Recommendation for discharge: (in order for the patient to meet his/her long term goals)  Physical therapy at least 2 days/week in the home     This discharge recommendation:  A follow-up discussion with the attending provider and/or case management is planned    IF patient discharges home will need the following DME: none       SUBJECTIVE:   Patient stated what are you talking about.     OBJECTIVE DATA SUMMARY:   Critical Behavior:  Neurologic State: Alert, Agitated  Orientation Level: Oriented X4  Cognition: Impulsive, Poor safety awareness  Safety/Judgement: Fall prevention  Functional Mobility Training:  Bed Mobility:  Rolling: Independent  Supine to Sit: Independent  Sit to Supine: Independent  Scooting: Independent        Transfers:  Sit to Stand: Stand-by assistance;Supervision  Stand to Sit: Stand-by assistance;Supervision        Bed to Chair: Stand-by assistance;Supervision                    Balance:  Sitting: Intact  Standing: Intact; Without support  Standing - Static: Good  Standing - Dynamic : Fair;Good  Ambulation/Gait Training:  Distance (ft): 150 Feet (ft)  Assistive Device: Gait belt  Ambulation - Level of Assistance: Contact guard assistance                                               Stairs: Activity Tolerance:   Good    After treatment patient left in no apparent distress:   Sitting in chair    COMMUNICATION/COLLABORATION:   The patients plan of care was discussed with: Occupational therapist and Registered nurse.      Liz Gomez PT   Time Calculation: 25 mins

## 2021-05-20 NOTE — PROGRESS NOTES
Patient refused fluids. Patient seems more agitated. Continued to be monitored.  Patient went into another patients room

## 2021-05-20 NOTE — PROGRESS NOTES
Transition of Care Plan:    RUR: 14% - \"low risk\"  Disposition: Transfer to [facility] (pending bed assignment)   *Report: to be provided once available   *Room: to be provided once available   *Accepting MD: Dr. Genevieve Elizondo contact information: [phone]  Follow up appointments: PCP & specialist as indicated   DME needed: No current DME needs identified for d/c  Transportation at Discharge: Pt on will call with [facility] for anticipated EMTALA transport this evening (5/20/21); PCS & EMTALA to be completed once available  Keys or means to access home: Pt's wife has access to the home       IM Medicare letter: 2nd IM needed prior to d/c  Caregiver Contact: Pt's wife Cornell Conn: [phone])  Discharge Caregiver contacted prior to discharge? To be contacted prior to d/c  COVID-19 test: Pt had COVID-19 test completed 5/20/21 in anticipation for possible transfer to [facility]; results \"indeterminate\"          Initial note: CM reviewed pt's chart prior to moving forward with d/c planning. Attending MD reported pt has been approved for transfer to [facility]; accepting MD at [facility] - Dr. Chad Narayan. CM contacted the [facility] ([phone]) to receive confirmation regarding bed availability this evening. CM spoke with [facility] representative ([address] [place] [address],[address]) who confirmed the pt has been approved for transfer & is awaiting a bed assignment. Number for report to become available once bed is assigned. CM placed pt on will call with [facility] for possible EMTALA transfer this evening; PCS completed, copy placed on chart. MD & RN to complete EMTALA prior to d/c. CM will continue to follow & report updates once available. Care Management Interventions  PCP Verified by CM:  Yes  Palliative Care Criteria Met (RRAT>21 & CHF Dx)?: No  Mode of Transport at Discharge: BLS (EMTALA transport to [facility])  Transition of Care Consult (CM Consult): Discharge Planning  Discharge Durable Medical Equipment: No  Physical Therapy Consult: Yes  Occupational Therapy Consult: Yes  Speech Therapy Consult: Yes  Current Support Network: Own Home, Lives with Spouse  Confirm Follow Up Transport: Family  [place] Resource Information Provided?: No  Discharge Location  Discharge Placement: Other: (EMTALA transfer to [facility])      Kojo Postal, [address] [facility] [place], [address] [facility]

## 2021-05-20 NOTE — PROGRESS NOTES
ADULT PROTOCOL: JET AEROSOL  REASSESSMENT    Patient  Ofe Miner Sr.     70 y.o.   male     5/20/2021  12:49 AM    Breath Sounds Pre Procedure: Right Breath Sounds: Diminished                               Left Breath Sounds: Diminished    Breath Sounds Post Procedure: Right Breath Sounds: Diminished                                 Left Breath Sounds: Diminished    Breathing pattern: Pre procedure Breathing Pattern: Regular          Post procedure Breathing Pattern: Regular    Heart Rate: Pre procedure Pulse: 77           Post procedure Pulse: 74    Resp Rate: Pre procedure Respirations: 16           Post procedure Respirations: 16            Cough: Pre procedure Cough: Non-productive               Post procedure        Oxygen: O2 Device: None (Room air)        Changed: NO    SpO2: Pre procedure SpO2: 93 %   without oxygen              Post procedure SpO2: 94 %  without oxygen    Nebulizer Therapy: Current medications Aerosolized Medications: Pulmicort, Brovana      Changed: NO    Smoking History:    Smoking status: Current Every Day Smoker       Packs/day: 1.50    Smokeless tobacco: Former User         Problem List:   Patient Active Problem List   Diagnosis Code    Severe sepsis ([facility] [address] A41.9, R65.20    ARF (acute renal failure) ([facility] N17.9    Gout M10.9    Hypotension I95.9    H/O atrial flutter Z86.79    Dehydration E86.0    A-fib ([facility]) I48.91    Acute CVA (cerebrovascular accident) ([facility] [address] I63.9    Abdominal pain R10.9    Acute pancreatitis K85.90       Respiratory Therapist: Priscilla Hirsch, RT

## 2021-05-20 NOTE — DISCHARGE SUMMARY
Hospitalist Discharge Summary     Patient ID:  Jackson Maharaj  [id number]  70 y.o.  1950 5/17/2021    PCP on record: Mell Rodriguez MD    Admit date: 5/17/2021  Discharge date and time: 5/20/2021    DISCHARGE DIAGNOSIS:    Acute pancreatitis    CONSULTATIONS:  IP CONSULT TO NEUROLOGY  IP CONSULT TO GASTROENTEROLOGY    Excerpted HPI from H&P of Toribio Abraham MD:  Ofe Miner is a 70 y.o.  male who presents with above complaints from home initially presented to outside ER ([facility] ED)-was found to have right occipital CVA in evolution with evidence of A. fib with mild RVR on EKG. patient was then transferred to [facility] [address] for further neuro work-up. Patient's denies any new complaints except persistence of mild abdominal discomfort. Dizziness/lightheadedness while walking and some blurry vision that has been there since Friday. Denies any chest pain, shortness of breath, palpitation, or syncope  Patient was found to have elevated WBC count, but was recently prescribed prednisone by PCP for gout flare  Patient had recently been diagnosed with pancreatitis on CT abdomen imaging in March-claims that he has stopped drinking alcohol since past 2 weeks. ______________________________________________________________________  DISCHARGE SUMMARY/HOSPITAL COURSE:  for full details see H&P, daily progress notes, labs, consult notes.      Acute CVA   AF  Neurology consultation  Continue full dose Lovenox  PT OT evaluation  LDL 37 as per neurology no indication for lipid therapy     Acute recurrent  Pancreatitis possibly alcoholic versus secondary to immunotherapy Keytruda  -NPO   -IV fluid   -pain med   -GI consult  Danyel Johnson was discussed with [facility] attending Dr. My Dorsey about possibility patient pancreatitis secondary to immunotherapy Keytruda,  -- start patient on solumedrol     alcohol abuse   -Continue monitoring for alcohol withdrawal     Gout   -continue colchicine      H/o Malignant melanoma in Lung     25.0 - 29.9 Overweight / Body mass index is 25.85 kg/m².     Estimated discharge date: May 20  Barriers:     Code status: Full  Prophylaxis: Lovenox  Recommended Disposition: Home w/Family        _______________________________________________________________________  Patient seen and examined by me on discharge day. Pertinent Findings:  Gen:    Not in distress  Chest: Clear lungs  CVS:   Regular rhythm. No edema  Abd:  Soft, not distended, not tender  Neuro:  Alert,   _______________________________________________________________________  DISCHARGE MEDICATIONS:   Current Discharge Medication List            Patient Follow Up Instructions: Activity: Activity as tolerated  Diet: NPO  Wound Care: As directed    Follow-up with PCP  in 4 days. Follow-up tests/labs     Follow-up Information    None       ________________________________________________________________    Risk of deterioration: Moderate    Condition at Discharge:  Stable  __________________________________________________________________    Disposition  [facility]    ____________________________________________________________________    Code Status: Full Code  ___________________________________________________________________      Total time in minutes spent coordinating this discharge (includes going over instructions, follow-up, prescriptions, and preparing report for sign off to her PCP) :  >30 minutes    Signed:   Diana Hylton MD

## 2021-05-20 NOTE — PROGRESS NOTES
1930 - Bedside report from LORENZO. emily controlled. Tender belly, no acute complaints. Notes vision reportedly much better.

## 2021-05-20 NOTE — PROGRESS NOTES
Problem: Self Care Deficits Care Plan (Adult)  Goal: *Acute Goals and Plan of Care (Insert Text)  Description: FUNCTIONAL STATUS PRIOR TO ADMISSION: Patient was independent and active without use of DME. 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 HOME SUPPORT: Patient lives with his wife, does not require assist at baseline. Occupational Therapy Goals  Initiated 5/18/2021   1. Patient will perform standing grooming, to include all aspects of container management, with supervision/set-up within 7 day(s). 2.  Patient will perform all aspects of upper body dressing and lower body dressing with supervision/set-up within 7 day(s). 3.  Patient will scan environment to L and R to identify and retrieve 5/5 ADL items with supervision/set-up and minimal verbal cues within 7 day(s). 4.  Patient will perform toilet transfers with supervision/set-up within 7 day(s). 5.  Patient will perform all aspects of toileting with supervision/set-up within 7 day(s). 6.  Patient will participate in more detailed visual-perceptual testing within 7 day(s). 7.  Patient will utilize fall prevention techniques during functional activities with minimal verbal cues within 7 day(s). Outcome: Progressing Towards Goal    OCCUPATIONAL THERAPY TREATMENT/DISCHARGE  Patient: Jeremiah Graves  (75 y.o. male)  Date: 5/20/2021  Diagnosis: A-fib [facility]) [I48.91]  Acute CVA (cerebrovascular accident) ([facility] [address] [I63.9] <principal problem not specified>       Precautions: Fall  Chart, occupational therapy assessment, plan of care, and goals were reviewed. ASSESSMENT  . Patient continues with skilled OT services and has progressed towards goals. Pt is agitated due to having the IV infusion at night and day and the alarm on bed going off when he moves. Tried to explain to pt that this does happen  in hospital and if he is able to move in bed being careful with the IV it may not alarm.   He was not in agreement with writer and continued to complain about the IV being wrapped around his neck at night when he rolls over in bed. He was able to pull up his socks, and lela Crocks with no assist and stood with SBA. Pt was not pleased about use of gait belt. Explained that gait belt has to be used in room  and [address] with therapy and he was not happy about having to wear a mask in hallway. Pt reports  that is able to bathe and dress self with no assist at the sink. He continues to state that he wants to leave and go to [facility] with his cancer dr.  At this time pt is independent with bed mobility and able to bathe at sink  with setup and is supervision to independent with transfers in room, with no assist device. At this time pt is doing well with ADLs and OT to discharge from services due to pt is at max functional level. Current Level of Function (ADLs/self-care): setup in this setting          PLAN :  Rationale for discharge: Goals achieved  Recommend with staff: [employer] for meals and walk to bathroom   Recommendation for discharge: (in order for the patient to meet his/her long term goals)  No skilled occupational therapy/ follow up rehabilitation needs identified at this time. This discharge recommendation:  Has been made in collaboration with the attending provider and/or case management    IF patient discharges home will need the following DME:none       SUBJECTIVE:   Patient stated I want to know how long I have to have this thing? Emmanuel Stout  ( pointing to the IV)    OBJECTIVE DATA SUMMARY:   Cognitive/Behavioral Status:  Neurologic State: Alert;Agitated  Orientation Level: Oriented X4  Cognition: Impulsive;Poor safety awareness  Perception: Appears intact  Perseveration: Perseverates during conversation  Safety/Judgement: Fall prevention    Functional Mobility and Transfers for ADLs:  Bed Mobility:  Rolling: Independent  Supine to Sit: Independent  Sit to Supine: Independent  Scooting: Independent    Transfers:  Sit to Stand: Stand-by assistance;Supervision  Functional Transfers  Bathroom Mobility: Contact guard assistance;Stand-by assistance  Toilet Transfer : Contact guard assistance;Stand-by assistance  Bed to Chair: Stand-by assistance;Supervision    Balance:  Sitting: Intact  Standing: Intact; Without support  Standing - Static: Good  Standing - Dynamic : Fair;Good    ADL Intervention:  Feeding  Feeding Assistance: Independent    Grooming  Grooming Assistance: Set-up  Position Performed: Standing  Washing Face: Supervision  Washing Hands: Supervision  Brushing Teeth: Supervision    Upper Body Bathing  Bathing Assistance: Set-up; Supervision  Position Performed: Standing    Lower Body Bathing  Bathing Assistance: Set-up; Supervision  Perineal  : Supervision;Set-up  Position Performed: Standing         Lower Body Dressing Assistance  Socks: Supervision    Toileting  Toileting Assistance: Contact guard assistance;Stand-by assistance  Bladder Hygiene: Contact guard assistance;Stand-by assistance  Bowel Hygiene: Contact guard assistance;Stand-by assistance    Cognitive Retraining  Safety/Judgement: Fall prevention          Pain:  none    Activity Tolerance:   Good    After treatment patient left in no apparent distress:   Sitting in chair, Call bell within reach, and Bed / chair alarm activated    COMMUNICATION/COLLABORATION:   The patients plan of care was discussed with: Physical therapist and Registered nurse.      Sarah Shoemaker OT  Time Calculation: 19 mins

## 2021-05-21 ENCOUNTER — APPOINTMENT (OUTPATIENT)
Dept: CT IMAGING | Age: 71
DRG: 064 | End: 2021-05-21
Attending: INTERNAL MEDICINE
Payer: MEDICARE

## 2021-05-21 VITALS
HEART RATE: 116 BPM | TEMPERATURE: 97.3 F | WEIGHT: 165.34 LBS | SYSTOLIC BLOOD PRESSURE: 166 MMHG | BODY MASS INDEX: 24.49 KG/M2 | HEIGHT: 69 IN | OXYGEN SATURATION: 98 % | DIASTOLIC BLOOD PRESSURE: 87 MMHG | RESPIRATION RATE: 18 BRPM

## 2021-05-21 LAB
ALBUMIN SERPL-MCNC: 2.3 G/DL (ref 3.5–5)
ALBUMIN/GLOB SERPL: 0.6 {RATIO} (ref 1.1–2.2)
ALP SERPL-CCNC: 112 U/L (ref 45–117)
ALT SERPL-CCNC: 23 U/L (ref 12–78)
ANION GAP SERPL CALC-SCNC: 6 MMOL/L (ref 5–15)
AST SERPL-CCNC: 26 U/L (ref 15–37)
BASOPHILS # BLD: 0 K/UL (ref 0–0.1)
BASOPHILS NFR BLD: 0 % (ref 0–1)
BILIRUB SERPL-MCNC: 0.8 MG/DL (ref 0.2–1)
BUN SERPL-MCNC: 28 MG/DL (ref 6–20)
BUN/CREAT SERPL: 35 (ref 12–20)
CALCIUM SERPL-MCNC: 8.2 MG/DL (ref 8.5–10.1)
CANCER AG19-9 SERPL-ACNC: 79 U/ML (ref 0–35)
CHLORIDE SERPL-SCNC: 103 MMOL/L (ref 97–108)
CO2 SERPL-SCNC: 25 MMOL/L (ref 21–32)
CREAT SERPL-MCNC: 0.8 MG/DL (ref 0.7–1.3)
DIFFERENTIAL METHOD BLD: ABNORMAL
EOSINOPHIL # BLD: 0 K/UL (ref 0–0.4)
EOSINOPHIL NFR BLD: 0 % (ref 0–7)
ERYTHROCYTE [DISTWIDTH] IN BLOOD BY AUTOMATED COUNT: 13 % (ref 11.5–14.5)
GLOBULIN SER CALC-MCNC: 4 G/DL (ref 2–4)
GLUCOSE SERPL-MCNC: 133 MG/DL (ref 65–100)
HCT VFR BLD AUTO: 45.1 % (ref 36.6–50.3)
HGB BLD-MCNC: 15.4 G/DL (ref 12.1–17)
IGG4 SER-MCNC: 18 MG/DL (ref 2–96)
IMM GRANULOCYTES # BLD AUTO: 0.4 K/UL (ref 0–0.04)
IMM GRANULOCYTES NFR BLD AUTO: 2 % (ref 0–0.5)
LIPASE SERPL-CCNC: 625 U/L (ref 73–393)
LYMPHOCYTES # BLD: 0.6 K/UL (ref 0.8–3.5)
LYMPHOCYTES NFR BLD: 3 % (ref 12–49)
MCH RBC QN AUTO: 31.2 PG (ref 26–34)
MCHC RBC AUTO-ENTMCNC: 34.1 G/DL (ref 30–36.5)
MCV RBC AUTO: 91.3 FL (ref 80–99)
MONOCYTES # BLD: 0.6 K/UL (ref 0–1)
MONOCYTES NFR BLD: 3 % (ref 5–13)
NEUTS SEG # BLD: 19.7 K/UL (ref 1.8–8)
NEUTS SEG NFR BLD: 92 % (ref 32–75)
NRBC # BLD: 0 K/UL (ref 0–0.01)
NRBC BLD-RTO: 0 PER 100 WBC
PLATELET # BLD AUTO: 215 K/UL (ref 150–400)
PMV BLD AUTO: 10.2 FL (ref 8.9–12.9)
POTASSIUM SERPL-SCNC: 4.5 MMOL/L (ref 3.5–5.1)
PROT SERPL-MCNC: 6.3 G/DL (ref 6.4–8.2)
RBC # BLD AUTO: 4.94 M/UL (ref 4.1–5.7)
RBC MORPH BLD: ABNORMAL
SODIUM SERPL-SCNC: 134 MMOL/L (ref 136–145)
WBC # BLD AUTO: 21.3 K/UL (ref 4.1–11.1)

## 2021-05-21 PROCEDURE — 83690 ASSAY OF LIPASE: CPT

## 2021-05-21 PROCEDURE — 74011250636 HC RX REV CODE- 250/636: Performed by: INTERNAL MEDICINE

## 2021-05-21 PROCEDURE — 2709999900 HC NON-CHARGEABLE SUPPLY

## 2021-05-21 PROCEDURE — 94640 AIRWAY INHALATION TREATMENT: CPT

## 2021-05-21 PROCEDURE — 74011250636 HC RX REV CODE- 250/636: Performed by: STUDENT IN AN ORGANIZED HEALTH CARE EDUCATION/TRAINING PROGRAM

## 2021-05-21 PROCEDURE — 85025 COMPLETE CBC W/AUTO DIFF WBC: CPT

## 2021-05-21 PROCEDURE — 74011250636 HC RX REV CODE- 250/636: Performed by: SPECIALIST

## 2021-05-21 PROCEDURE — 70450 CT HEAD/BRAIN W/O DYE: CPT

## 2021-05-21 PROCEDURE — 74011000258 HC RX REV CODE- 258: Performed by: PHYSICIAN ASSISTANT

## 2021-05-21 PROCEDURE — 74011250636 HC RX REV CODE- 250/636: Performed by: NURSE PRACTITIONER

## 2021-05-21 PROCEDURE — 74011000250 HC RX REV CODE- 250: Performed by: INTERNAL MEDICINE

## 2021-05-21 PROCEDURE — 80053 COMPREHEN METABOLIC PANEL: CPT

## 2021-05-21 PROCEDURE — 74011250637 HC RX REV CODE- 250/637: Performed by: SPECIALIST

## 2021-05-21 PROCEDURE — 74011250637 HC RX REV CODE- 250/637: Performed by: INTERNAL MEDICINE

## 2021-05-21 PROCEDURE — 74011250636 HC RX REV CODE- 250/636: Performed by: PHYSICIAN ASSISTANT

## 2021-05-21 PROCEDURE — 65660000000 HC RM CCU STEPDOWN

## 2021-05-21 PROCEDURE — 36415 COLL VENOUS BLD VENIPUNCTURE: CPT

## 2021-05-21 RX ORDER — LORAZEPAM 2 MG/ML
2 INJECTION INTRAMUSCULAR
Status: DISCONTINUED | OUTPATIENT
Start: 2021-05-21 | End: 2021-05-22 | Stop reason: HOSPADM

## 2021-05-21 RX ORDER — LORAZEPAM 2 MG/ML
4 INJECTION INTRAMUSCULAR
Status: DISCONTINUED | OUTPATIENT
Start: 2021-05-21 | End: 2021-05-22 | Stop reason: HOSPADM

## 2021-05-21 RX ADMIN — PROPRANOLOL HYDROCHLORIDE 10 MG: 10 TABLET ORAL at 17:57

## 2021-05-21 RX ADMIN — PROPRANOLOL HYDROCHLORIDE 10 MG: 10 TABLET ORAL at 10:51

## 2021-05-21 RX ADMIN — FAMOTIDINE 20 MG: 10 INJECTION, SOLUTION INTRAVENOUS at 10:50

## 2021-05-21 RX ADMIN — ENOXAPARIN SODIUM 80 MG: 80 INJECTION SUBCUTANEOUS at 17:57

## 2021-05-21 RX ADMIN — LORAZEPAM 2 MG: 2 INJECTION INTRAMUSCULAR; INTRAVENOUS at 00:42

## 2021-05-21 RX ADMIN — ENOXAPARIN SODIUM 80 MG: 80 INJECTION SUBCUTANEOUS at 04:50

## 2021-05-21 RX ADMIN — COLCHICINE 0.6 MG: 0.6 TABLET, FILM COATED ORAL at 10:51

## 2021-05-21 RX ADMIN — LORAZEPAM 2 MG: 2 INJECTION INTRAMUSCULAR; INTRAVENOUS at 03:10

## 2021-05-21 RX ADMIN — POLYETHYLENE GLYCOL 3350 17 G: 17 POWDER, FOR SOLUTION ORAL at 17:57

## 2021-05-21 RX ADMIN — MEROPENEM 500 MG: 500 INJECTION, POWDER, FOR SOLUTION INTRAVENOUS at 12:18

## 2021-05-21 RX ADMIN — POLYETHYLENE GLYCOL 3350 17 G: 17 POWDER, FOR SOLUTION ORAL at 10:57

## 2021-05-21 RX ADMIN — METHYLPREDNISOLONE SODIUM SUCCINATE 120 MG: 40 INJECTION, POWDER, FOR SOLUTION INTRAMUSCULAR; INTRAVENOUS at 10:50

## 2021-05-21 RX ADMIN — MEROPENEM 500 MG: 500 INJECTION, POWDER, FOR SOLUTION INTRAVENOUS at 06:32

## 2021-05-21 RX ADMIN — FAMOTIDINE 20 MG: 10 INJECTION, SOLUTION INTRAVENOUS at 22:01

## 2021-05-21 RX ADMIN — MEROPENEM 500 MG: 500 INJECTION, POWDER, FOR SOLUTION INTRAVENOUS at 17:57

## 2021-05-21 NOTE — PROGRESS NOTES
Hospitalist Progress Note    NAME: Dakotah Quinones Sr.   :  1950   MRN:  [id number]       Assessment / Plan:     Acute CVA   AF  Neurology consultation  Continue full dose Lovenox  PT OT evaluation  LDL 37 as per neurology no indication for lipid therapy     Acute recurrent  Pancreatitis possibly alcoholic versus secondary to immunotherapy Keytruda  -NPO   -IV fluid   -pain med   -GI consult  Tona Steele was discussed with [facility] attending Dr. Kelly Manuel possibility patient pancreatitis secondary to immunotherapy Keytruda,  -- start patient on solumedrol     alcohol abuse   -Continue monitoring for alcohol withdrawal  -- Ativan PRN      Gout   -continue colchicine      H/o Malignant melanoma in Lung     25.0 - 29.9 Overweight / Body mass index is 25.85 kg/m².     Estimated discharge date: May 20  Barriers:     Code status: Full  Prophylaxis: Lovenox  Recommended Disposition: Home w/Family           Subjective:     Chief Complaint / Reason for Physician Visit  . Discussed with RN events overnight. Review of Systems:  Symptom Y/N Comments  Symptom Y/N Comments   Fever/Chills    Chest Pain     Poor Appetite    Edema     Cough    Abdominal Pain     Sputum    Joint Pain     SOB/DANIELS    Pruritis/Rash     Nausea/vomit    Tolerating PT/OT     Diarrhea    Tolerating Diet     Constipation    Other       Could NOT obtain due to:      Objective:     VITALS:   Last 24hrs VS reviewed since prior progress note.  Most recent are:  Patient Vitals for the past 24 hrs:   Temp Pulse Resp BP SpO2   21 1142 98 °F (36.7 °C) 70 20 137/76 95 %   21 0753 98.1 °F (36.7 °C) 81 18 (!) 149/94 99 %   21 0304 97.3 °F (36.3 °C) 88 18 (!) 150/86 95 %   21 2356 97.2 °F (36.2 °C) 72 20 (!) 142/93 94 %   21     95 %   21 1913 98.4 °F (36.9 °C) 65 20 132/68 96 %   21 1533 97.4 °F (36.3 °C) 80 22 136/72 97 %       Intake/Output Summary (Last 24 hours) at 2021 1453  Last data filed at 5/21/2021 1218  Gross per 24 hour   Intake    Output 150 ml   Net -150 ml        I had a face to face encounter and independently examined this patient on 5/21/2021, as outlined below:  PHYSICAL EXAM:  General: WD, WN. Alert, cooperative, no acute distress    EENT:  EOMI. Anicteric sclerae. MMM  Resp:  CTA bilaterally, no wheezing or rales. No accessory muscle use  CV:  Regular  rhythm,  No edema  GI:  Soft, Non distended, Non tender. +Bowel sounds  Neurologic:  Alert and oriented X 3, normal speech,   Psych:   Good insight. Not anxious nor agitated  Skin:  No rashes. No jaundice    Reviewed most current lab test results and cultures  YES  Reviewed most current radiology test results   YES  Review and summation of old records today    NO  Reviewed patient's current orders and MAR    YES  PMH/ reviewed - no change compared to H&P  ________________________________________________________________________  Care Plan discussed with:    Comments   Patient     Family      RN     Care Manager     Consultant                        Multidiciplinary team rounds were held today with , nursing, pharmacist and clinical coordinator. Patient's plan of care was discussed; medications were reviewed and discharge planning was addressed. ________________________________________________________________________  Total NON critical care TIME35   Minutes    Total CRITICAL CARE TIME Spent:   Minutes non procedure based      Comments   >50% of visit spent in counseling and coordination of care     ________________________________________________________________________  Jason Murcia MD     Procedures: see electronic medical records for all procedures/Xrays and details which were not copied into this note but were reviewed prior to creation of Plan. LABS:  I reviewed today's most current labs and imaging studies.   Pertinent labs include:  Recent Labs     05/21/21  0045 05/20/21  0124 05/19/21  7388 WBC 21.3* 23.2* 21.3*   HGB 15.4 16.7 16.9   HCT 45.1 49.2 49.8    191 186     Recent Labs     05/21/21  0045 05/20/21  0124 05/19/21  0821   * 134* 136   K 4.5 4.7 4.4    103 103   CO2 25 24 24   * 129* 77   BUN 28* 22* 23*   CREA 0.80 0.88 0.79   CA 8.2* 8.5 8.1*   ALB 2.3*  --  2.5*   TBILI 0.8  --  1.1*   ALT 23  --  19       Signed:  Slava Garibay MD

## 2021-05-21 NOTE — PROGRESS NOTES
Speech pathology note  Reviewed chart and note patient with increased CIWA score overnight and received Ativan, and patient now confused. Patient not appropriate for SLP treatment at this time. Will continue to follow.     Larissa Lagunas., CCC-SLP

## 2021-05-21 NOTE — PROGRESS NOTES
Comprehensive Nutrition Assessment    Type and Reason for Visit: Initial, NPO/clear liquid    Nutrition Recommendations/Plan:   Diet advancement per GI     Nutrition Assessment:     Patient medically noted for CVA, AFIB, Pancreatitis, and malignant melanoma. Patient NPO x 4 days. Noted plans to transfer to [facility] per patient request. Lipase trending down last 2 days. Will monitor ability to advance to PO diet vs need for nutrition support. Estimated Daily Nutrient Needs:  Energy (kcal): 1944 kcal (BMR 1495 x 1. 3AF); Weight Used for Energy Requirements: Current  Protein (g): 75-90g (1.0-1.2 g/kg bw); Weight Used for Protein Requirements: Current  Fluid (ml/day): 1950 mL; Method Used for Fluid Requirements: 1 ml/kcal    Nutrition Related Findings:       Lipase 625, Na 134, -923-77-90  +BS  Famotidine, Solu-medrol, Miralax    Wounds:    None       Current Nutrition Therapies:  DIET NPO With Sips of Clear Fluids    Anthropometric Measures:  · Height:  5' 9\" (175.3 cm)  · Current Body Wt:  75 kg (165 lb 5.5 oz)   · BMI Category:  Normal weight (BMI 18.5-24. 9)       Nutrition Diagnosis:   · Inadequate protein-energy intake related to altered GI function as evidenced by NPO or clear liquid status due to medical condition    Nutrition Interventions:   Food and/or Nutrient Delivery: Start oral diet  Nutrition Education and Counseling: No recommendations at this time  Coordination of Nutrition Care: No recommendation at this time    Goals:  Diet advanced vs nutrition support next 2-3 days       Nutrition Monitoring and Evaluation:   Behavioral-Environmental Outcomes: None identified  Food/Nutrient Intake Outcomes: Diet advancement/tolerance  Physical Signs/Symptoms Outcomes: Biochemical data, GI status, Weight    Discharge Planning:     Too soon to determine     Electronically signed by Fleta Rubinstein, RD on 5/21/2021 at 1:53 PM    Contact: ext [phone]

## 2021-05-21 NOTE — PROGRESS NOTES
[facility] transfer center called. Stated they have a bed available tonight. They will help set up ambulance transport. Pt will be getting admitted to critical care hospital bed 112. The number for report # [phone]. Called radiology department and spoke with technician. All radiology imaging (XR, CT, US will be copied to disc and tubed up to nursing station). For echo and carotids that may need to be faxed tomorrow, since department is closed now.

## 2021-05-21 NOTE — PROGRESS NOTES
Gastroenterology Progress Note  GARCÍA Ivy   for Dr. 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 Awaiting bed from [facility]. More confused and agitated overnight. Received Ativan overnight. Nothing today.   Per nurse more oriented now than this AM.     There is no bleeding    WBC decreased from 23.1 to 21.3 today, lipase down to 625, Na 134, BUN 28, Cr 0.8, LFTs WNL      Current Facility-Administered Medications   Medication Dose Route Frequency    LORazepam (ATIVAN) injection 2 mg  2 mg IntraVENous Q1H PRN    LORazepam (ATIVAN) injection 4 mg  4 mg IntraVENous Q1H PRN    ondansetron (ZOFRAN) injection 4 mg  4 mg IntraVENous Q6H PRN    lactulose (CHRONULAC) 10 gram/15 mL solution 30 mL  20 g Oral DAILY PRN    famotidine (PF) (PEPCID) injection 20 mg  20 mg IntraVENous Q12H    methylPREDNISolone (PF) (SOLU-MEDROL) injection 120 mg  120 mg IntraVENous DAILY    meropenem (MERREM) 500 mg in 0.9% sodium chloride (MBP/ADV) 50 mL MBP  500 mg IntraVENous Q6H    lactated Ringers infusion  200 mL/hr IntraVENous CONTINUOUS    HYDROmorphone (DILAUDID) injection 1 mg  1 mg IntraVENous Q4H PRN    polyethylene glycol (MIRALAX) packet 17 g  17 g Oral BID    colchicine tablet 0.6 mg  0.6 mg Oral DAILY    arformoterol 15 mcg/budesonide 0.25 mg neb solution   Nebulization BID    propranoloL (INDERAL) tablet 10 mg  10 mg Oral BID    acetaminophen (TYLENOL) tablet 650 mg  650 mg Oral Q4H PRN    Or    acetaminophen (TYLENOL) solution 650 mg  650 mg Per NG tube Q4H PRN    Or    acetaminophen (TYLENOL) suppository 650 mg  650 mg Rectal Q4H PRN    labetaloL (NORMODYNE;TRANDATE) injection 5 mg  5 mg IntraVENous Q10MIN PRN    enoxaparin (LOVENOX) injection 80 mg  1 mg/kg SubCUTAneous Q12H        Objective:     Blood pressure 137/76, pulse 70, temperature 98 °F (36.7 °C), resp. rate 20, height 5' 9\" (1.753 m), weight 75 kg (165 lb 5.5 oz), SpO2 95 %.    05/21 0701 - 05/21 1900  In: -   Out: 150 [Urine:150]    05/19 1901 - 05/21 0700  In: 2063.3 [I.V.:2063.3]  Out: 400 [Urine:400]    EXAM:   GEN: Unkempt WM, NAD  HEENT: NCAT  Heart: RRR  Lungs; CTA  Abdomen: Soft, RUQ/epigastric tenderness as well as mild LLQ tenderness without guarding or rebounding. Normal BS. Ext: No edema  Neuro: Oriented to person, place and somewhat time    Data Review    Recent Results (from the past 24 hour(s))   CBC WITH AUTOMATED DIFF    Collection Time: 05/21/21 12:45 AM   Result Value Ref Range    WBC 21.3 (H) 4.1 - 11.1 K/uL    RBC 4.94 4.10 - 5.70 M/uL    HGB 15.4 12.1 - 17.0 g/dL    HCT 45.1 36.6 - 50.3 %    MCV 91.3 80.0 - 99.0 FL    MCH 31.2 26.0 - 34.0 PG    MCHC 34.1 30.0 - 36.5 g/dL    RDW 13.0 11.5 - 14.5 %    PLATELET 795 915 - 201 K/uL    MPV 10.2 8.9 - 12.9 FL    NRBC 0.0 0  WBC    ABSOLUTE NRBC 0.00 0.00 - 0.01 K/uL    NEUTROPHILS 92 (H) 32 - 75 %    LYMPHOCYTES 3 (L) 12 - 49 %    MONOCYTES 3 (L) 5 - 13 %    EOSINOPHILS 0 0 - 7 %    BASOPHILS 0 0 - 1 %    IMMATURE GRANULOCYTES 2 (H) 0.0 - 0.5 %    ABS. NEUTROPHILS 19.7 (H) 1.8 - 8.0 K/UL    ABS. LYMPHOCYTES 0.6 (L) 0.8 - 3.5 K/UL    ABS. MONOCYTES 0.6 0.0 - 1.0 K/UL    ABS. EOSINOPHILS 0.0 0.0 - 0.4 K/UL    ABS. BASOPHILS 0.0 0.0 - 0.1 K/UL    ABS. IMM.  GRANS. 0.4 (H) 0.00 - 0.04 K/UL    DF SMEAR SCANNED      RBC COMMENTS NORMOCYTIC, NORMOCHROMIC     METABOLIC PANEL, COMPREHENSIVE    Collection Time: 05/21/21 12:45 AM   Result Value Ref Range    Sodium 134 (L) 136 - 145 mmol/L    Potassium 4.5 3.5 - 5.1 mmol/L    Chloride 103 97 - 108 mmol/L    CO2 25 21 - 32 mmol/L    Anion gap 6 5 - 15 mmol/L    Glucose 133 (H) 65 - 100 mg/dL    BUN 28 (H) 6 - 20 MG/DL    Creatinine 0.80 0.70 - 1.30 MG/DL BUN/Creatinine ratio 35 (H) 12 - 20      GFR est AA >60 >60 ml/min/1.73m2    GFR est non-AA >60 >60 ml/min/1.73m2    Calcium 8.2 (L) 8.5 - 10.1 MG/DL    Bilirubin, total 0.8 0.2 - 1.0 MG/DL    ALT (SGPT) 23 12 - 78 U/L    AST (SGOT) 26 15 - 37 U/L    Alk. phosphatase 112 45 - 117 U/L    Protein, total 6.3 (L) 6.4 - 8.2 g/dL    Albumin 2.3 (L) 3.5 - 5.0 g/dL    Globulin 4.0 2.0 - 4.0 g/dL    A-G Ratio 0.6 (L) 1.1 - 2.2     LIPASE    Collection Time: 05/21/21 12:45 AM   Result Value Ref Range    Lipase 625 (H) 73 - 393 U/L     Recent Labs     05/21/21  0045 05/20/21  0124   WBC 21.3* 23.2*   HGB 15.4 16.7   HCT 45.1 49.2    191     Recent Labs     05/21/21  0045 05/20/21  0124 05/19/21  0821   * 134* 136   K 4.5 4.7 4.4    103 103   CO2 25 24 24   BUN 28* 22* 23*   CREA 0.80 0.88 0.79   * 129* 77   CA 8.2* 8.5 8.1*     Recent Labs     05/21/21  0045 05/20/21  0124 05/19/21  0821   ALT 23  --  19     --  131*   TBILI 0.8  --  1.1*   TP 6.3*  --  5.8*   ALB 2.3*  --  2.5*   GLOB 4.0  --  3.3   LPSE 625* 733* 2,220*     No results for input(s): INR, PTP, APTT, INREXT, INREXT in the last 72 hours. No results for input(s): FE, TIBC, PSAT, FERR in the last 72 hours. No results found for: FOL, RBCF   No results for input(s): PH, PCO2, PO2 in the last 72 hours. No results for input(s): CPK, CKNDX, TROIQ in the last 72 hours.     No lab exists for component: CPKMB  Lab Results   Component Value Date/Time    Cholesterol, total 87 05/18/2021 01:31 PM    HDL Cholesterol 32 05/18/2021 01:31 PM    LDL, calculated 37 05/18/2021 01:31 PM    Triglyceride 90 05/18/2021 01:31 PM    CHOL/HDL Ratio 2.7 05/18/2021 01:31 PM     No results found for: Shanta Cho  Lab Results   Component Value Date/Time    Color YELLOW/STRAW 05/17/2021 09:50 AM    Appearance CLEAR 05/17/2021 09:50 AM    Specific gravity 1.015 05/17/2021 09:50 AM    Specific gravity 1.017 08/22/2018 07:48 PM    pH (UA) 5.0 05/17/2021 09:50 AM Protein TRACE (A) 05/17/2021 09:50 AM    Glucose Negative 05/17/2021 09:50 AM    Ketone Negative 05/17/2021 09:50 AM    Bilirubin Negative 05/17/2021 09:50 AM    Urobilinogen 1.0 05/17/2021 09:50 AM    Nitrites Negative 05/17/2021 09:50 AM    Leukocyte Esterase Negative 05/17/2021 09:50 AM    Epithelial cells FEW 05/17/2021 09:50 AM    Bacteria Negative 05/17/2021 09:50 AM    WBC 5-10 05/17/2021 09:50 AM    RBC 0-5 05/17/2021 09:50 AM     CT Results (most recent):  Results from [place] encounter on 05/17/21    CT ABD PELV W CONT    Narrative  EXAM: CT ABD PELV W CONT    INDICATION: acute pancreatitis    COMPARISON: CT 3/17/2021    CONTRAST: 100 mL of Isovue-370. TECHNIQUE:  Following the uneventful intravenous administration of contrast, thin axial  images were obtained through the abdomen and pelvis. Coronal and sagittal  reconstructions were generated. Oral contrast 50 mL Omnipaque 240 administered. CT dose reduction was achieved through use of a standardized protocol tailored  for this examination and automatic exposure control for dose modulation. FINDINGS:  LOWER THORAX: No significant abnormality in the incidentally imaged lower chest.  LIVER: No significant change in right lobe cyst. Otherwise unremarkable with  normal enhancement of hepatic vascular structures. BILIARY TREE: Gallbladder is within normal limits. CBD is not dilated. SPLEEN: Enlarged with multiple peripheral wedge-shaped hypodensities. PANCREAS: There are multiple calcifications and no duct dilation. Minimal  peripancreatic edema. There is a focal rounded hypodense fullness in the  pancreatic head measuring 2.9 x 2.8 cm which appears enlarged from prior.  There  is fat stranding which extends inferior from the uncinate process around the  posterior margin of the superior mesenteric vein in conjunction with a ovoid 1.9  x 3.9 cm collection anterior to the horizontal duodenum with extensive  progression of mesenteric fat stranding in the mesentery associated with the  distal superior mesenteric artery branches. ADRENALS: Unremarkable. KIDNEYS: Bilateral renal cysts and vascular calcifications redemonstrated with  no hydronephrosis or enhancing mass. 2 mm collecting system stones suspected at  the lower pole of the right kidney and at the upper pole of the left kidney. STOMACH: Small hiatal hernia. Otherwise unremarkable. SMALL BOWEL: No dilatation or wall thickening. COLON: No dilatation or wall thickening. APPENDIX: Clear absent. PERITONEUM: Small pelvic free fluid. No free air. RETROPERITONEUM: Atherosclerotic calcification without aneurysm or dissection. No enlarged lymphadenopathy. REPRODUCTIVE ORGANS: Prostate and seminal vesicles appear unremarkable. URINARY BLADDER: No mass or calculus. BONES: Degenerative spine change. Diffuse osteopenia. No acute fracture or  aggressive lesion. ABDOMINAL WALL: No mass or hernia. ADDITIONAL COMMENTS: N/A    Impression  1. Enlarged focal soft tissue fullness which is slightly hypodense and the  pancreatic head likely reflective of focal pancreatitis. However, underlying  neoplasm cannot exclude and further evaluation with dedicated pancreatic MRI  preferred over multiphase CT is recommended once patient is over acute illness. 2. There is inflammatory change extending inferior to the pancreas with a 1.9 x  3.9 cm collection anterior to the horizontal duodenum which may reflect  pseudocyst progression of extensive inflammatory stranding in the small bowel  mesentery suspicious for pancreatitis related fat necrosis. 3. Nonobstructing nephrolithiasis, splenic infarcts with mild splenomegaly, and  additional incidentals as above.          Assessment:     Active Problems:    H/O atrial flutter (8/23/2018)      A-fib ([address] (5/17/2021)      Acute CVA (cerebrovascular accident) ([address] (5/17/2021)      Abdominal pain (5/17/2021)      Acute pancreatitis (5/17/2021)        Plan: Lipase and WBC trending down. Repeat CT on 5/19 showing persistent pancreatitis however unable to exclude mass. Recommend f/u MRI on resolution of acute inflammation. Also with fluid collection: 1.9x3.9, also with inflammatory stranding concerning for pancreatitis related fat necrosis. Started on meropenum on 5/19. Not requiring Pain meds , advance to [facility]. Continue IVF. Dr. 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 Started on high dose steroids on 5/19 as recommended, will go to 1mg/kg in AM.  Patient is requesting to be transferred to [facility] to be on his oncologist's service, has been accepted but awaiting a bed. Per , bed unlikely to be available today.      GARCÍA Michelle    05/21/21  3:24 PM    [address]: [phone]

## 2021-05-21 NOTE — PROGRESS NOTES
Received message from patient's nurse Madi Mota stating :    Pt admitted 5/17/21 has been on CIWA precautions but no PRN medications ordered, his CIWA earlier today was 5 but is not 6, he has been refusing most medications per report today, he is also now refusing to keep on his tele box, would like to know if you would like to place an order for one time or PRN Ativan per the CIWA protocol. Thank you. Discussion / orders:    Ativan ordered per CIWA protocol           Please note that this note was dictated using Dragon computer voice recognition software. Quite often unanticipated grammatical, syntax, homophones, and other interpretive errors are inadvertently transcribed by the computer software. Please disregard these errors. Please excuse any errors that have escaped final proofreading.

## 2021-05-21 NOTE — PROGRESS NOTES
1923  Bedside and Verbal shift change report given to (oncoming nurse) by Lenore Rehman (offgoing nurse). Report included the following information SBAR, Intake/Output, MAR and Recent Results. 2032 assessment completed, pt is somewhat agitated but follows commands, Alert and oriented x 4, CIWA score is 5, refer to the complex assessment flow sheet for details. Pt is refusing ordered continuous fluids. Assisted pt to bathroom and back to bed, bed alarm on, pt instructed to call out using the call bell prior to getting out of bed. 2135 Assisted pt to bathroom and back to bed, bed alarm on, pt instructed to call out using the call bell prior to getting out of bed.    2356 reassessment completed, changes charted by exception in the complex assessment flow sheet, pt CIWA score is 11, pt refusing telemetry monitoring. Assisted pt to bathroom and back to bed, bed alarm on, pt instructed to call out using the call bell prior to getting out of bed. 0016 contacted the on call provider to update on pt via perfect serve. 0034 orders received    0042 PRN ativan given per CIWA protocol    0138 pt resting in bed, telemetry monitoring resumed. CIWA score 4, pt removed PIV new PIV started. 0304 reassessment completed, changes charted by exception in the complex assessment flow sheet, CIWA score is 8, PRN ativan given per the CIWA protocol. 0345 CIWA score is 6 on reassessment. 0444 CHG bath given, gown, bed pad and sheets changed     0515 pt resting in bed    0637 [facility] called to update on pt bed availability,  pt should be able to transfer this afternoon to [facility].    0702 pt pulled out PIV    0735 Bedside and Verbal shift change report given to Danita Kothari (oncoming nurse) by Seamus Motta nurseEdwin. Report included the following information SBAR, ED Summary, Intake/Output, MAR, Recent Results and Cardiac Rhythm a fib.

## 2021-05-22 NOTE — PROGRESS NOTES
Pt left with [facility] transport. Personal belongings in possession. Wife notified. Report called to receiving RN. Paperwork sent with [facility]. Emtala signed.

## 2021-05-22 NOTE — PROGRESS NOTES
Verbal shift  report given to Kaye Bradley RN at [address] [place] [address].  included the following information SBAR, Intake/Output, MAR and Recent Results. All questions answered callback number left with RN.

## 2022-03-18 PROBLEM — M10.9 GOUT: Status: ACTIVE | Noted: 2018-08-23

## 2022-03-19 PROBLEM — K85.90 ACUTE PANCREATITIS: Status: ACTIVE | Noted: 2021-05-17

## 2022-03-19 PROBLEM — A41.9 SEVERE SEPSIS (HCC): Status: ACTIVE | Noted: 2018-07-01

## 2022-03-19 PROBLEM — R65.20 SEVERE SEPSIS (HCC): Status: ACTIVE | Noted: 2018-07-01

## 2022-03-19 PROBLEM — I48.91 A-FIB (HCC): Status: ACTIVE | Noted: 2021-05-17

## 2022-03-19 PROBLEM — I95.9 HYPOTENSION: Status: ACTIVE | Noted: 2018-08-23

## 2022-03-19 PROBLEM — Z86.79 H/O ATRIAL FLUTTER: Status: ACTIVE | Noted: 2018-08-23

## 2022-03-19 PROBLEM — R10.9 ABDOMINAL PAIN: Status: ACTIVE | Noted: 2021-05-17

## 2022-03-20 PROBLEM — I63.9 ACUTE CVA (CEREBROVASCULAR ACCIDENT) (HCC): Status: ACTIVE | Noted: 2021-05-17

## 2022-03-20 PROBLEM — N17.9 ARF (ACUTE RENAL FAILURE) (HCC): Status: ACTIVE | Noted: 2018-08-23

## 2022-03-20 PROBLEM — E86.0 DEHYDRATION: Status: ACTIVE | Noted: 2018-08-24

## 2023-05-25 RX ORDER — PREDNISONE 10 MG/1
TABLET ORAL
COMMUNITY
Start: 2021-05-14

## 2023-05-25 RX ORDER — FLUTICASONE FUROATE AND VILANTEROL 100; 25 UG/1; UG/1
1 POWDER RESPIRATORY (INHALATION) DAILY
COMMUNITY

## 2023-05-25 RX ORDER — LANOLIN ALCOHOL/MO/W.PET/CERES
100 CREAM (GRAM) TOPICAL DAILY
COMMUNITY
Start: 2018-07-08

## 2023-05-25 RX ORDER — ALLOPURINOL 100 MG/1
100 TABLET ORAL DAILY
COMMUNITY
Start: 2018-08-24

## 2023-05-25 RX ORDER — COLCHICINE 0.6 MG/1
0.6 TABLET ORAL DAILY
COMMUNITY

## 2023-05-25 RX ORDER — PROPRANOLOL HYDROCHLORIDE 10 MG/1
10 TABLET ORAL 2 TIMES DAILY
COMMUNITY
Start: 2018-07-07

## 2023-05-25 RX ORDER — FOLIC ACID 1 MG/1
1 TABLET ORAL DAILY
COMMUNITY
Start: 2018-07-08